# Patient Record
Sex: FEMALE | Race: WHITE | NOT HISPANIC OR LATINO
[De-identification: names, ages, dates, MRNs, and addresses within clinical notes are randomized per-mention and may not be internally consistent; named-entity substitution may affect disease eponyms.]

---

## 2017-01-04 ENCOUNTER — MEDICATION RENEWAL (OUTPATIENT)
Age: 72
End: 2017-01-04

## 2017-01-11 ENCOUNTER — MEDICATION RENEWAL (OUTPATIENT)
Age: 72
End: 2017-01-11

## 2017-04-26 ENCOUNTER — APPOINTMENT (OUTPATIENT)
Dept: ENDOCRINOLOGY | Facility: CLINIC | Age: 72
End: 2017-04-26

## 2017-08-29 ENCOUNTER — OUTPATIENT (OUTPATIENT)
Dept: OUTPATIENT SERVICES | Facility: HOSPITAL | Age: 72
LOS: 1 days | End: 2017-08-29
Payer: MEDICARE

## 2017-08-29 DIAGNOSIS — Z98.89 OTHER SPECIFIED POSTPROCEDURAL STATES: Chronic | ICD-10-CM

## 2017-08-29 PROCEDURE — 76881 US COMPL JOINT R-T W/IMG: CPT

## 2017-08-29 PROCEDURE — 76881 US COMPL JOINT R-T W/IMG: CPT | Mod: 26,LT

## 2017-11-08 ENCOUNTER — APPOINTMENT (OUTPATIENT)
Dept: HEART AND VASCULAR | Facility: CLINIC | Age: 72
End: 2017-11-08
Payer: MEDICARE

## 2017-11-08 VITALS
SYSTOLIC BLOOD PRESSURE: 140 MMHG | OXYGEN SATURATION: 98 % | HEART RATE: 88 BPM | WEIGHT: 142 LBS | HEIGHT: 60.24 IN | TEMPERATURE: 97.8 F | DIASTOLIC BLOOD PRESSURE: 72 MMHG | BODY MASS INDEX: 27.52 KG/M2

## 2017-11-08 PROCEDURE — 99214 OFFICE O/P EST MOD 30 MIN: CPT | Mod: 25

## 2017-11-08 RX ORDER — METRONIDAZOLE 7.5 MG/G
0.75 GEL TOPICAL
Qty: 45 | Refills: 0 | Status: ACTIVE | COMMUNITY
Start: 2017-06-26

## 2017-11-08 RX ORDER — FLUTICASONE PROPIONATE 50 UG/1
50 SPRAY, METERED NASAL
Qty: 16 | Refills: 0 | Status: ACTIVE | COMMUNITY
Start: 2017-08-25

## 2018-05-02 ENCOUNTER — EMERGENCY (EMERGENCY)
Facility: HOSPITAL | Age: 73
LOS: 1 days | Discharge: HOME | End: 2018-05-02
Attending: EMERGENCY MEDICINE | Admitting: EMERGENCY MEDICINE
Payer: MEDICARE

## 2018-05-02 VITALS
TEMPERATURE: 97 F | SYSTOLIC BLOOD PRESSURE: 169 MMHG | HEART RATE: 66 BPM | DIASTOLIC BLOOD PRESSURE: 84 MMHG | RESPIRATION RATE: 16 BRPM | OXYGEN SATURATION: 100 %

## 2018-05-02 VITALS
TEMPERATURE: 97 F | OXYGEN SATURATION: 99 % | DIASTOLIC BLOOD PRESSURE: 79 MMHG | HEART RATE: 75 BPM | RESPIRATION RATE: 18 BRPM | SYSTOLIC BLOOD PRESSURE: 156 MMHG

## 2018-05-02 DIAGNOSIS — Z79.899 OTHER LONG TERM (CURRENT) DRUG THERAPY: ICD-10-CM

## 2018-05-02 DIAGNOSIS — Z98.89 OTHER SPECIFIED POSTPROCEDURAL STATES: Chronic | ICD-10-CM

## 2018-05-02 DIAGNOSIS — R11.0 NAUSEA: ICD-10-CM

## 2018-05-02 DIAGNOSIS — R42 DIZZINESS AND GIDDINESS: ICD-10-CM

## 2018-05-02 DIAGNOSIS — Z88.0 ALLERGY STATUS TO PENICILLIN: ICD-10-CM

## 2018-05-02 DIAGNOSIS — R19.7 DIARRHEA, UNSPECIFIED: ICD-10-CM

## 2018-05-02 DIAGNOSIS — E11.9 TYPE 2 DIABETES MELLITUS WITHOUT COMPLICATIONS: ICD-10-CM

## 2018-05-02 DIAGNOSIS — Z88.4 ALLERGY STATUS TO ANESTHETIC AGENT: ICD-10-CM

## 2018-05-02 DIAGNOSIS — Z88.8 ALLERGY STATUS TO OTHER DRUGS, MEDICAMENTS AND BIOLOGICAL SUBSTANCES: ICD-10-CM

## 2018-05-02 DIAGNOSIS — Z79.84 LONG TERM (CURRENT) USE OF ORAL HYPOGLYCEMIC DRUGS: ICD-10-CM

## 2018-05-02 LAB
ALBUMIN SERPL ELPH-MCNC: 4.7 G/DL — SIGNIFICANT CHANGE UP (ref 3.3–5)
ALP SERPL-CCNC: 76 U/L — SIGNIFICANT CHANGE UP (ref 40–120)
ALT FLD-CCNC: 25 U/L — SIGNIFICANT CHANGE UP (ref 10–45)
ANION GAP SERPL CALC-SCNC: 15 MMOL/L — SIGNIFICANT CHANGE UP (ref 5–17)
APPEARANCE UR: CLEAR — SIGNIFICANT CHANGE UP
AST SERPL-CCNC: 25 U/L — SIGNIFICANT CHANGE UP (ref 10–40)
BASOPHILS NFR BLD AUTO: 0.2 % — SIGNIFICANT CHANGE UP (ref 0–2)
BILIRUB SERPL-MCNC: 0.3 MG/DL — SIGNIFICANT CHANGE UP (ref 0.2–1.2)
BILIRUB UR-MCNC: NEGATIVE — SIGNIFICANT CHANGE UP
BUN SERPL-MCNC: 19 MG/DL — SIGNIFICANT CHANGE UP (ref 7–23)
CALCIUM SERPL-MCNC: 10.2 MG/DL — SIGNIFICANT CHANGE UP (ref 8.4–10.5)
CHLORIDE SERPL-SCNC: 92 MMOL/L — LOW (ref 96–108)
CO2 SERPL-SCNC: 26 MMOL/L — SIGNIFICANT CHANGE UP (ref 22–31)
COLOR SPEC: YELLOW — SIGNIFICANT CHANGE UP
CREAT SERPL-MCNC: 0.58 MG/DL — SIGNIFICANT CHANGE UP (ref 0.5–1.3)
DIFF PNL FLD: (no result)
EOSINOPHIL NFR BLD AUTO: 0.2 % — SIGNIFICANT CHANGE UP (ref 0–6)
EXTRA BLUE TOP TUBE: SIGNIFICANT CHANGE UP
EXTRA SST TUBE: SIGNIFICANT CHANGE UP
GLUCOSE SERPL-MCNC: 194 MG/DL — HIGH (ref 70–99)
GLUCOSE UR QL: NEGATIVE — SIGNIFICANT CHANGE UP
HCT VFR BLD CALC: 39.8 % — SIGNIFICANT CHANGE UP (ref 34.5–45)
HGB BLD-MCNC: 13.7 G/DL — SIGNIFICANT CHANGE UP (ref 11.5–15.5)
KETONES UR-MCNC: NEGATIVE — SIGNIFICANT CHANGE UP
LEUKOCYTE ESTERASE UR-ACNC: NEGATIVE — SIGNIFICANT CHANGE UP
LYMPHOCYTES # BLD AUTO: 10.4 % — LOW (ref 13–44)
MCHC RBC-ENTMCNC: 29.8 PG — SIGNIFICANT CHANGE UP (ref 27–34)
MCHC RBC-ENTMCNC: 34.4 G/DL — SIGNIFICANT CHANGE UP (ref 32–36)
MCV RBC AUTO: 86.7 FL — SIGNIFICANT CHANGE UP (ref 80–100)
MONOCYTES NFR BLD AUTO: 4 % — SIGNIFICANT CHANGE UP (ref 2–14)
NEUTROPHILS NFR BLD AUTO: 85.2 % — HIGH (ref 43–77)
NITRITE UR-MCNC: NEGATIVE — SIGNIFICANT CHANGE UP
PH UR: 6.5 — SIGNIFICANT CHANGE UP (ref 5–8)
PLATELET # BLD AUTO: 354 K/UL — SIGNIFICANT CHANGE UP (ref 150–400)
POTASSIUM SERPL-MCNC: 4 MMOL/L — SIGNIFICANT CHANGE UP (ref 3.5–5.3)
POTASSIUM SERPL-SCNC: 4 MMOL/L — SIGNIFICANT CHANGE UP (ref 3.5–5.3)
PROT SERPL-MCNC: 8.1 G/DL — SIGNIFICANT CHANGE UP (ref 6–8.3)
PROT UR-MCNC: NEGATIVE MG/DL — SIGNIFICANT CHANGE UP
RBC # BLD: 4.59 M/UL — SIGNIFICANT CHANGE UP (ref 3.8–5.2)
RBC # FLD: 13.3 % — SIGNIFICANT CHANGE UP (ref 10.3–16.9)
SODIUM SERPL-SCNC: 133 MMOL/L — LOW (ref 135–145)
SP GR SPEC: 1.01 — SIGNIFICANT CHANGE UP (ref 1–1.03)
UROBILINOGEN FLD QL: 0.2 E.U./DL — SIGNIFICANT CHANGE UP
WBC # BLD: 12.2 K/UL — HIGH (ref 3.8–10.5)
WBC # FLD AUTO: 12.2 K/UL — HIGH (ref 3.8–10.5)

## 2018-05-02 PROCEDURE — 85025 COMPLETE CBC W/AUTO DIFF WBC: CPT

## 2018-05-02 PROCEDURE — 99284 EMERGENCY DEPT VISIT MOD MDM: CPT | Mod: 25

## 2018-05-02 PROCEDURE — 80053 COMPREHEN METABOLIC PANEL: CPT

## 2018-05-02 PROCEDURE — 96374 THER/PROPH/DIAG INJ IV PUSH: CPT

## 2018-05-02 PROCEDURE — 81001 URINALYSIS AUTO W/SCOPE: CPT

## 2018-05-02 PROCEDURE — 93005 ELECTROCARDIOGRAM TRACING: CPT

## 2018-05-02 PROCEDURE — 87086 URINE CULTURE/COLONY COUNT: CPT

## 2018-05-02 PROCEDURE — 93010 ELECTROCARDIOGRAM REPORT: CPT

## 2018-05-02 RX ORDER — ONDANSETRON 8 MG/1
4 TABLET, FILM COATED ORAL ONCE
Qty: 0 | Refills: 0 | Status: COMPLETED | OUTPATIENT
Start: 2018-05-02 | End: 2018-05-02

## 2018-05-02 RX ORDER — SODIUM CHLORIDE 9 MG/ML
1000 INJECTION INTRAMUSCULAR; INTRAVENOUS; SUBCUTANEOUS ONCE
Qty: 0 | Refills: 0 | Status: COMPLETED | OUTPATIENT
Start: 2018-05-02 | End: 2018-05-02

## 2018-05-02 RX ADMIN — ONDANSETRON 4 MILLIGRAM(S): 8 TABLET, FILM COATED ORAL at 11:20

## 2018-05-02 RX ADMIN — SODIUM CHLORIDE 1000 MILLILITER(S): 9 INJECTION INTRAMUSCULAR; INTRAVENOUS; SUBCUTANEOUS at 11:19

## 2018-05-02 NOTE — ED PROVIDER NOTE - OBJECTIVE STATEMENT
72 y/o f hx RA, DM presents stating woke up this morning, feeling dizzy for a few minutes with room spinning.  Pt stating this resolved after brief period of time, then has had several loose stools and feeling nauseous.  Pt stating no longer dizzy, feeling fatigued, thinks maybe dehydrated and still feeling nauseous.  Denies headache, visual changes, fever, abd pain, vomiting, all other ROS negative.

## 2018-05-02 NOTE — ED ADULT NURSE NOTE - OBJECTIVE STATEMENT
72 y/o female c/o nausea, dizziness, diarrhea since 6am this morning. denies any chest pain, vomiting, fever/chills, recent travel, sick contacts, SOB. NAD, VSS. pt ambulates with cane and unsteady gait at baseline. EKG done, labs sent, JEN Christianson at bedside. 72 y/o female c/o nausea, dizziness, diarrhea since 6am this morning. denies any chest pain, vomiting, fever/chills, recent travel, sick contacts, SOB. NAD, VSS. pt ambulates with cane at baseline. EKG done, labs sent, JEN Christianson at bedside.

## 2018-05-02 NOTE — ED PROVIDER NOTE - MEDICAL DECISION MAKING DETAILS
74 y/o f hx RA, DM presents stating having diarrhea, nausea today, associated with dizziness this morning which resolved.  Neuro exam intact, EKG in sinus, labs noted with wbc 12, nontender abd.  Given IV fluid, zofran, feeling better, suspect likely viral etiology.  Will d/c to f/u pmd, advised to return if sx worsen.

## 2018-05-03 LAB
CULTURE RESULTS: SIGNIFICANT CHANGE UP
SPECIMEN SOURCE: SIGNIFICANT CHANGE UP

## 2018-05-08 ENCOUNTER — APPOINTMENT (OUTPATIENT)
Dept: HEART AND VASCULAR | Facility: CLINIC | Age: 73
End: 2018-05-08
Payer: MEDICARE

## 2018-05-08 VITALS
HEART RATE: 87 BPM | SYSTOLIC BLOOD PRESSURE: 150 MMHG | OXYGEN SATURATION: 99 % | TEMPERATURE: 98.2 F | WEIGHT: 146.3 LBS | DIASTOLIC BLOOD PRESSURE: 80 MMHG | BODY MASS INDEX: 27.98 KG/M2 | HEIGHT: 60.63 IN

## 2018-05-08 PROCEDURE — 99213 OFFICE O/P EST LOW 20 MIN: CPT

## 2018-06-11 ENCOUNTER — APPOINTMENT (OUTPATIENT)
Dept: HEART AND VASCULAR | Facility: CLINIC | Age: 73
End: 2018-06-11
Payer: MEDICARE

## 2018-06-11 PROCEDURE — 93306 TTE W/DOPPLER COMPLETE: CPT

## 2018-11-12 ENCOUNTER — APPOINTMENT (OUTPATIENT)
Dept: HEART AND VASCULAR | Facility: CLINIC | Age: 73
End: 2018-11-12
Payer: MEDICARE

## 2018-11-12 VITALS
BODY MASS INDEX: 28.69 KG/M2 | TEMPERATURE: 98 F | HEIGHT: 60.63 IN | SYSTOLIC BLOOD PRESSURE: 130 MMHG | OXYGEN SATURATION: 98 % | WEIGHT: 150 LBS | HEART RATE: 96 BPM | DIASTOLIC BLOOD PRESSURE: 60 MMHG

## 2018-11-12 PROCEDURE — 93000 ELECTROCARDIOGRAM COMPLETE: CPT

## 2018-11-12 PROCEDURE — 99214 OFFICE O/P EST MOD 30 MIN: CPT

## 2018-11-12 RX ORDER — LEVOTHYROXINE SODIUM 125 UG/1
125 TABLET ORAL
Refills: 0 | Status: ACTIVE | COMMUNITY
Start: 2017-07-27

## 2018-11-12 NOTE — ASSESSMENT
[FreeTextEntry1] : LBBB- intermittent, often alternates with a LAHB.  S/P a coronary CTA 2010 with clean coronaries.\par \par Fibromyalgia- formerly a pt of Dr Lyn, soon to be with Dr Merritt, ? PMR per Dr Anton\par \par HTN- stable on no meds, elevated from pain and steroid injections, will defer treatment but would favor an ACE I\par \par DM- screened for CAD in 2010, clinically stable.  She is very sensitive to meds so I am reluctant to start a statin in light of her musculoskeletal pain syndromes\par \par RA- pts conduction abnormalities could be due to RA.  Will follow echo as well for aortic root size. NL June 11, 2018

## 2018-11-12 NOTE — REVIEW OF SYSTEMS
[Joint Pain] : joint pain [Joint Swelling] : joint swelling [Joint Stiffness] : joint stiffness [Muscle Cramps] : muscle cramps [Limb Weakness (Paresis)] : limb weakness

## 2018-11-12 NOTE — HISTORY OF PRESENT ILLNESS
[FreeTextEntry1] : GI- Dr Herbert colonoscopy Oct 2017\par Pulm- Dr Amin\par Rheum- Dr Merritt\par PCP- Dr Anton\par Derm- Dr Lakesha Ramos\par Endo- Dr Tisha MO\par [facility]- Dr Joana Seth, ovarian cysts\par Neuro- Dr Lutz\par Ophtho- Dr Hector Everett\par Pain- Dr Garrison

## 2019-05-14 ENCOUNTER — APPOINTMENT (OUTPATIENT)
Dept: HEART AND VASCULAR | Facility: CLINIC | Age: 74
End: 2019-05-14
Payer: MEDICARE

## 2019-05-14 VITALS
HEIGHT: 60.63 IN | TEMPERATURE: 97.5 F | SYSTOLIC BLOOD PRESSURE: 130 MMHG | WEIGHT: 145.99 LBS | BODY MASS INDEX: 27.92 KG/M2 | DIASTOLIC BLOOD PRESSURE: 60 MMHG | HEART RATE: 89 BPM | OXYGEN SATURATION: 98 %

## 2019-05-14 PROCEDURE — 93000 ELECTROCARDIOGRAM COMPLETE: CPT

## 2019-05-14 PROCEDURE — 99214 OFFICE O/P EST MOD 30 MIN: CPT

## 2019-05-14 NOTE — ASSESSMENT
[FreeTextEntry1] : LBBB- intermittent, often alternates with a LAHB.  S/P a coronary CTA 2010 with clean coronaries and a CCS of zero.\par \par Fibromyalgia- formerly a pt of Dr Lyn, soon to be with Dr Merritt,(never went) ? PMR per Dr Anton\par \par HTN- stable on no meds, elevated from pain and steroid injections, will defer treatment but would favor an ACE I\par \par DM- screened for CAD in 2010 and had a CCS of zero and no plaque on CTA, clinically stable.  She is very sensitive to meds so I am reluctant to start a statin in light of her musculoskeletal pain syndromes\par \par RA- pts conduction abnormalities could be due to RA.  Will follow echo as well for aortic root size. NL June 11, 2018

## 2019-05-14 NOTE — REASON FOR VISIT
[FreeTextEntry1] : 71 y/o F with an intermittent LBBB, DM, RA and occasionally elevated BPs.  A coronary CTA in 2010 revealed clean coronaries and a CCS of zero.  Last echo June 2018  had an EF of 66% and a normal aortic root.\par \par [place] [facility] [place] Had a flu like illness after that, seen in ER glucose high, last A1c 5.9\par Told of PMR\par Had an ulcer(GI)\par \par EKG: NSR, left anterior hemiblock, low voltage,  no ST-Tw abn. 5/14/19

## 2019-05-14 NOTE — HISTORY OF PRESENT ILLNESS
[FreeTextEntry1] : GI- Dr Herbert colonoscopy Oct 2017\par Pulm- Dr Amin\par Rheum- Dr Trung Merritt(never went)\par PCP- Dr Anton\par Derm- Dr Lakesha Ramos\par Endo- Dr Tisha MO\par [facility]- Dr Joana Seth, ovarian cysts\par Neuro- Dr Lutz\par Ophtho- Dr Hector Everett\par Pain- Dr Garrison

## 2019-05-14 NOTE — PHYSICAL EXAM
[Normal Appearance] : normal appearance [General Appearance - Well Developed] : well developed [Well Groomed] : well groomed [General Appearance - Well Nourished] : well nourished [No Deformities] : no deformities [Normal Conjunctiva] : the conjunctiva exhibited no abnormalities [General Appearance - In No Acute Distress] : no acute distress [Eyelids - No Xanthelasma] : the eyelids demonstrated no xanthelasmas [Normal Oral Mucosa] : normal oral mucosa [No Oral Pallor] : no oral pallor [No Oral Cyanosis] : no oral cyanosis [Respiration, Rhythm And Depth] : normal respiratory rhythm and effort [Auscultation Breath Sounds / Voice Sounds] : lungs were clear to auscultation bilaterally [Exaggerated Use Of Accessory Muscles For Inspiration] : no accessory muscle use [Heart Rate And Rhythm] : heart rate and rhythm were normal [Heart Sounds] : normal S1 and S2 [Murmurs] : no murmurs present [Abdomen Soft] : soft [Abdomen Tenderness] : non-tender [Abnormal Walk] : normal gait [Abdomen Mass (___ Cm)] : no abdominal mass palpated [Cyanosis, Localized] : no localized cyanosis [Nail Clubbing] : no clubbing of the fingernails [Petechial Hemorrhages (___cm)] : no petechial hemorrhages [Skin Color & Pigmentation] : normal skin color and pigmentation [No Venous Stasis] : no venous stasis [] : no rash [Skin Lesions] : no skin lesions [No Skin Ulcers] : no skin ulcer [No Xanthoma] : no  xanthoma was observed [Oriented To Time, Place, And Person] : oriented to person, place, and time [Affect] : the affect was normal [Mood] : the mood was normal [No Anxiety] : not feeling anxious [FreeTextEntry1] : no JVD or bruits

## 2019-05-14 NOTE — REVIEW OF SYSTEMS
[Joint Pain] : joint pain [Joint Stiffness] : joint stiffness [Joint Swelling] : joint swelling [Limb Weakness (Paresis)] : limb weakness [Muscle Cramps] : muscle cramps

## 2019-09-26 ENCOUNTER — APPOINTMENT (OUTPATIENT)
Dept: RHEUMATOLOGY | Facility: CLINIC | Age: 74
End: 2019-09-26
Payer: MEDICARE

## 2019-09-26 VITALS
SYSTOLIC BLOOD PRESSURE: 125 MMHG | OXYGEN SATURATION: 99 % | TEMPERATURE: 98.6 F | BODY MASS INDEX: 28.69 KG/M2 | HEIGHT: 60.63 IN | DIASTOLIC BLOOD PRESSURE: 72 MMHG | HEART RATE: 93 BPM | WEIGHT: 150 LBS

## 2019-09-26 DIAGNOSIS — M06.9 RHEUMATOID ARTHRITIS, UNSPECIFIED: ICD-10-CM

## 2019-09-26 DIAGNOSIS — M81.0 AGE-RELATED OSTEOPOROSIS W/OUT CURRENT PATHOLOGICAL FRACTURE: ICD-10-CM

## 2019-09-26 PROCEDURE — 36415 COLL VENOUS BLD VENIPUNCTURE: CPT

## 2019-09-26 PROCEDURE — 99205 OFFICE O/P NEW HI 60 MIN: CPT | Mod: 25

## 2019-10-02 LAB
ALBUMIN SERPL ELPH-MCNC: 4.8 G/DL
ALP BLD-CCNC: 87 U/L
ALT SERPL-CCNC: 24 U/L
ANA SER IF-ACNC: NEGATIVE
ANION GAP SERPL CALC-SCNC: 14 MMOL/L
APTT 2H P 1:4 NP PPP: 33.2 SEC
APTT 2H P INC PPP: 33.2 SEC
APTT HEX PL PPP: NEGATIVE
APTT IMM NP/PRE NP PPP: 32.7 SEC
APTT INV RATIO PPP: 38.1 SEC
AST SERPL-CCNC: 22 U/L
B2 GLYCOPROT1 IGA SERPL IA-ACNC: <5 SAU
B2 GLYCOPROT1 IGG SER-ACNC: <5 SGU
B2 GLYCOPROT1 IGM SER-ACNC: <5 SMU
BASOPHILS # BLD AUTO: 0.05 K/UL
BASOPHILS NFR BLD AUTO: 0.6 %
BILIRUB SERPL-MCNC: 0.2 MG/DL
BUN SERPL-MCNC: 22 MG/DL
C3 SERPL-MCNC: 176 MG/DL
C4 SERPL-MCNC: 33 MG/DL
CALCIUM SERPL-MCNC: 10.4 MG/DL
CARDIOLIPIN IGM SER-MCNC: 7.5 GPL
CARDIOLIPIN IGM SER-MCNC: 9.1 MPL
CCP AB SER IA-ACNC: <8 UNITS
CENTROMERE IGG SER-ACNC: <0.2 CD:130001892
CHLORIDE SERPL-SCNC: 103 MMOL/L
CO2 SERPL-SCNC: 24 MMOL/L
CONFIRM: 25.7 SEC
CREAT SERPL-MCNC: 0.56 MG/DL
CRP SERPL-MCNC: 0.62 MG/DL
DEPRECATED CARDIOLIPIN IGA SER: <5 APL
DRVVT IMM 1:2 NP PPP: NORMAL
DRVVT SCREEN TO CONFIRM RATIO: 0.91 RATIO
DSDNA AB SER-ACNC: <12 IU/ML
ENA RNP AB SER IA-ACNC: <0.2 AL
ENA SCL70 IGG SER IA-ACNC: <0.2 AL
ENA SM AB SER IA-ACNC: <0.2 AL
ENA SS-A AB SER IA-ACNC: <0.2 AL
ENA SS-B AB SER IA-ACNC: <0.2 AL
EOSINOPHIL # BLD AUTO: 0.12 K/UL
EOSINOPHIL NFR BLD AUTO: 1.3 %
ERYTHROCYTE [SEDIMENTATION RATE] IN BLOOD BY WESTERGREN METHOD: 32 MM/HR
GLUCOSE SERPL-MCNC: 169 MG/DL
HCT VFR BLD CALC: 41.4 %
HEX-1: 49.9 SECS
HEX-2: 47.1 SECS
HGB BLD-MCNC: 12.9 G/DL
IGA SER QL IEP: 82 MG/DL
IGG SER QL IEP: 644 MG/DL
IGM SER QL IEP: 225 MG/DL
IMM GRANULOCYTES NFR BLD AUTO: 0.3 %
LYMPHOCYTES # BLD AUTO: 2.04 K/UL
LYMPHOCYTES NFR BLD AUTO: 22.6 %
MAN DIFF?: NORMAL
MCHC RBC-ENTMCNC: 28.5 PG
MCHC RBC-ENTMCNC: 31.2 GM/DL
MCV RBC AUTO: 91.6 FL
MONOCYTES # BLD AUTO: 0.81 K/UL
MONOCYTES NFR BLD AUTO: 9 %
NEUTROPHILS # BLD AUTO: 5.97 K/UL
NEUTROPHILS NFR BLD AUTO: 66.2 %
NPP NORMAL POOLED PLASMA: 32.6 SECS
PLATELET # BLD AUTO: 350 K/UL
POTASSIUM SERPL-SCNC: 4.4 MMOL/L
PROT SERPL-MCNC: 6.8 G/DL
PS IGA SER QL: <20 U/ML
PS IGG SER QL: <10 U/ML
PS IGM SER QL: 28 U/ML
RBC # BLD: 4.52 M/UL
RBC # FLD: 14.4 %
RF+CCP IGG SER-IMP: NEGATIVE
RHEUMATOID FACT SER QL: <10 IU/ML
SCREEN DRVVT: 29.4 SEC
SILICA CLOTTING TIME INTERPRETATION: NORMAL
SILICA CLOTTING TIME S/C: 1.03 RATIO
SODIUM SERPL-SCNC: 141 MMOL/L
WBC # FLD AUTO: 9.02 K/UL

## 2019-10-07 ENCOUNTER — FORM ENCOUNTER (OUTPATIENT)
Age: 74
End: 2019-10-07

## 2019-10-08 ENCOUNTER — OUTPATIENT (OUTPATIENT)
Dept: OUTPATIENT SERVICES | Facility: HOSPITAL | Age: 74
LOS: 1 days | End: 2019-10-08
Payer: MEDICARE

## 2019-10-08 DIAGNOSIS — Z98.89 OTHER SPECIFIED POSTPROCEDURAL STATES: Chronic | ICD-10-CM

## 2019-10-08 PROCEDURE — 73130 X-RAY EXAM OF HAND: CPT | Mod: 26,50

## 2019-10-08 PROCEDURE — 73080 X-RAY EXAM OF ELBOW: CPT

## 2019-10-08 PROCEDURE — 72202 X-RAY EXAM SI JOINTS 3/> VWS: CPT | Mod: 26

## 2019-10-08 PROCEDURE — 73080 X-RAY EXAM OF ELBOW: CPT | Mod: 26,50

## 2019-10-08 PROCEDURE — 73130 X-RAY EXAM OF HAND: CPT

## 2019-10-08 PROCEDURE — 72202 X-RAY EXAM SI JOINTS 3/> VWS: CPT

## 2019-11-07 ENCOUNTER — APPOINTMENT (OUTPATIENT)
Dept: RHEUMATOLOGY | Facility: CLINIC | Age: 74
End: 2019-11-07

## 2019-11-18 ENCOUNTER — APPOINTMENT (OUTPATIENT)
Dept: HEART AND VASCULAR | Facility: CLINIC | Age: 74
End: 2019-11-18
Payer: MEDICARE

## 2019-11-18 VITALS
OXYGEN SATURATION: 98 % | TEMPERATURE: 97.5 F | WEIGHT: 143.98 LBS | HEART RATE: 83 BPM | HEIGHT: 60.63 IN | DIASTOLIC BLOOD PRESSURE: 60 MMHG | BODY MASS INDEX: 27.54 KG/M2 | SYSTOLIC BLOOD PRESSURE: 122 MMHG

## 2019-11-18 PROCEDURE — 99213 OFFICE O/P EST LOW 20 MIN: CPT

## 2019-11-18 PROCEDURE — 93000 ELECTROCARDIOGRAM COMPLETE: CPT

## 2019-11-18 NOTE — REVIEW OF SYSTEMS
[Joint Pain] : joint pain [Joint Stiffness] : joint stiffness [Joint Swelling] : joint swelling [Muscle Cramps] : muscle cramps [Limb Weakness (Paresis)] : limb weakness

## 2019-11-18 NOTE — REASON FOR VISIT
[FreeTextEntry1] : 73 y/o F with an intermittent LBBB, DM, RA and occasionally elevated BPs.  A coronary CTA in 2010 revealed clean coronaries and a CCS of zero.  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 RBBB is new as of 11/18/19

## 2019-11-18 NOTE — ASSESSMENT
[FreeTextEntry1] : LBBB- intermittent, often alternates with a LAHB.  S/P a coronary CTA 2010 with clean coronaries and a CCS of zero.  Now  a RBBB on 11/18/19\par \par Fibromyalgia- formerly a pt of Dr Lyn, soon to be with Dr Merritt,  ? PMR per Dr Anton\par \par HTN- stable on no meds, elevated from pain and steroid injections, will defer treatment but would favor an ACE I.  BP better\par \par DM- screened for CAD in 2010 and had a CCS of zero and no plaque on CTA, clinically stable.  She is very sensitive to meds so I am reluctant to start a statin in light of her musculoskeletal pain syndromes\par \par RA- pts conduction abnormalities could be due to RA.  Will follow echo as well for aortic root size. NL June 11, 2018.  New RBBB on 11/18/19.  Pt did not click with Dr Merritt who did not think pt had RA.

## 2019-11-18 NOTE — PHYSICAL EXAM
[Normal Appearance] : normal appearance [General Appearance - Well Developed] : well developed [Well Groomed] : well groomed [No Deformities] : no deformities [General Appearance - Well Nourished] : well nourished [General Appearance - In No Acute Distress] : no acute distress [Normal Conjunctiva] : the conjunctiva exhibited no abnormalities [Eyelids - No Xanthelasma] : the eyelids demonstrated no xanthelasmas [Normal Oral Mucosa] : normal oral mucosa [No Oral Pallor] : no oral pallor [No Oral Cyanosis] : no oral cyanosis [Respiration, Rhythm And Depth] : normal respiratory rhythm and effort [Exaggerated Use Of Accessory Muscles For Inspiration] : no accessory muscle use [Auscultation Breath Sounds / Voice Sounds] : lungs were clear to auscultation bilaterally [Heart Rate And Rhythm] : heart rate and rhythm were normal [Heart Sounds] : normal S1 and S2 [Abdomen Soft] : soft [Murmurs] : no murmurs present [Abdomen Tenderness] : non-tender [Abdomen Mass (___ Cm)] : no abdominal mass palpated [Abnormal Walk] : normal gait [Nail Clubbing] : no clubbing of the fingernails [Cyanosis, Localized] : no localized cyanosis [Petechial Hemorrhages (___cm)] : no petechial hemorrhages [Skin Color & Pigmentation] : normal skin color and pigmentation [] : no rash [No Venous Stasis] : no venous stasis [Skin Lesions] : no skin lesions [No Xanthoma] : no  xanthoma was observed [No Skin Ulcers] : no skin ulcer [Oriented To Time, Place, And Person] : oriented to person, place, and time [Affect] : the affect was normal [Mood] : the mood was normal [No Anxiety] : not feeling anxious [FreeTextEntry1] : no JVD or bruits

## 2020-05-06 ENCOUNTER — APPOINTMENT (OUTPATIENT)
Dept: HEART AND VASCULAR | Facility: CLINIC | Age: 75
End: 2020-05-06

## 2020-05-11 ENCOUNTER — APPOINTMENT (OUTPATIENT)
Dept: HEART AND VASCULAR | Facility: CLINIC | Age: 75
End: 2020-05-11
Payer: MEDICARE

## 2020-05-11 PROCEDURE — 99442: CPT | Mod: CR

## 2020-09-16 ENCOUNTER — APPOINTMENT (OUTPATIENT)
Dept: HEART AND VASCULAR | Facility: CLINIC | Age: 75
End: 2020-09-16
Payer: MEDICARE

## 2020-09-16 VITALS
HEART RATE: 96 BPM | BODY MASS INDEX: 27.54 KG/M2 | WEIGHT: 143.98 LBS | DIASTOLIC BLOOD PRESSURE: 80 MMHG | SYSTOLIC BLOOD PRESSURE: 140 MMHG | OXYGEN SATURATION: 98 % | TEMPERATURE: 97.4 F | HEIGHT: 60.63 IN

## 2020-09-16 DIAGNOSIS — R25.2 CRAMP AND SPASM: ICD-10-CM

## 2020-09-16 DIAGNOSIS — E03.9 HYPOTHYROIDISM, UNSPECIFIED: ICD-10-CM

## 2020-09-16 PROCEDURE — 36415 COLL VENOUS BLD VENIPUNCTURE: CPT

## 2020-09-16 PROCEDURE — 93000 ELECTROCARDIOGRAM COMPLETE: CPT

## 2020-09-16 PROCEDURE — 99214 OFFICE O/P EST MOD 30 MIN: CPT

## 2020-09-16 NOTE — REVIEW OF SYSTEMS
[Joint Pain] : joint pain [Joint Swelling] : joint swelling [Joint Stiffness] : joint stiffness [Limb Weakness (Paresis)] : limb weakness [Muscle Cramps] : muscle cramps [Feeling Fatigued] : feeling fatigued [Dyspnea on exertion] : dyspnea during exertion [Negative] : Gastrointestinal

## 2020-09-17 LAB
T3 SERPL-MCNC: 76 NG/DL
T4 SERPL-MCNC: 4.3 UG/DL
TSH SERPL-ACNC: 40.6 UIU/ML

## 2020-09-17 NOTE — HISTORY OF PRESENT ILLNESS
[FreeTextEntry1] : GI- Dr Herbert colonoscopy Oct 2017\par Pulm- Dr Amin\par Rheum- Dr Trung Merritt(never went)\par PCP- Dr Anton\par Derm- Dr Lakesha Ramos\par Endo- Dr Jessica MO\par [facility]- Dr Joana Seth, ovarian cysts\par Neuro- Dr Lutz\par Ophtho- Dr Hector Everett\par Pain- Dr Garrison\par PT- Dr Manuelito Noe

## 2020-09-17 NOTE — ASSESSMENT
[FreeTextEntry1] : LBBB- intermittent, often alternates with a LAHB.  S/P a coronary CTA 2010 with clean coronaries and a CCS of zero.  Now  a RBBB on 11/18/19.  TFTs sent as she is on reduced thyroid hormone.\par \par Leg pains- After examining pt and finding B/L 2+ DP pulses I doubt that this is claudication, MARCIO ordered.\par Fibromyalgia- formerly a pt of Dr Lyn, soon to be with Dr Merritt,  ? PMR per Dr Anton\par \par HTN- stable on no meds, elevated from pain and steroid injections, will defer treatment but would favor an ACE I.  BP borderline\par \par DM- screened for CAD in 2010 and had a CCS of zero and no plaque on CTA, clinically stable.  She is very sensitive to meds so I am reluctant to start a statin in light of her musculoskeletal pain syndromes\par \par RA- pts conduction abnormalities could be due to RA.  Will follow echo as well for aortic root size. NL June 11, 2018.  New RBBB on 11/18/19.  Pt did not click with Dr Merritt who did not think pt had RA.

## 2020-09-17 NOTE — PHYSICAL EXAM
[Normal Appearance] : normal appearance [General Appearance - Well Developed] : well developed [General Appearance - Well Nourished] : well nourished [Well Groomed] : well groomed [No Deformities] : no deformities [General Appearance - In No Acute Distress] : no acute distress [Eyelids - No Xanthelasma] : the eyelids demonstrated no xanthelasmas [Normal Conjunctiva] : the conjunctiva exhibited no abnormalities [Respiration, Rhythm And Depth] : normal respiratory rhythm and effort [Heart Rate And Rhythm] : heart rate and rhythm were normal [Auscultation Breath Sounds / Voice Sounds] : lungs were clear to auscultation bilaterally [Exaggerated Use Of Accessory Muscles For Inspiration] : no accessory muscle use [Murmurs] : no murmurs present [Heart Sounds] : normal S1 and S2 [Abdomen Soft] : soft [Abdomen Mass (___ Cm)] : no abdominal mass palpated [Abdomen Tenderness] : non-tender [Nail Clubbing] : no clubbing of the fingernails [Abnormal Walk] : normal gait [Petechial Hemorrhages (___cm)] : no petechial hemorrhages [Cyanosis, Localized] : no localized cyanosis [Skin Color & Pigmentation] : normal skin color and pigmentation [] : no rash [No Venous Stasis] : no venous stasis [Skin Lesions] : no skin lesions [No Skin Ulcers] : no skin ulcer [Oriented To Time, Place, And Person] : oriented to person, place, and time [No Xanthoma] : no  xanthoma was observed [No Anxiety] : not feeling anxious [Affect] : the affect was normal [Mood] : the mood was normal [FreeTextEntry1] : DP 2+ B/L, PT not felt

## 2020-09-17 NOTE — REASON FOR VISIT
[FreeTextEntry1] : 71 y/o F with an intermittent LBBB, DM, RA and occasionally elevated BPs.  A coronary CTA in 2010 revealed clean coronaries and a CCS of zero.  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  Therapist could not find pedal pulses.\par \par EKG: NSR, RBBB, left anterior hemiblock, low voltage,  no ST-Tw abn. RBBB is new as of 11/18/19\par 9/16/20

## 2020-09-18 ENCOUNTER — APPOINTMENT (OUTPATIENT)
Dept: HEART AND VASCULAR | Facility: CLINIC | Age: 75
End: 2020-09-18
Payer: MEDICARE

## 2020-09-18 PROCEDURE — 93880 EXTRACRANIAL BILAT STUDY: CPT

## 2020-10-04 ENCOUNTER — TRANSCRIPTION ENCOUNTER (OUTPATIENT)
Age: 75
End: 2020-10-04

## 2020-10-05 ENCOUNTER — RESULT REVIEW (OUTPATIENT)
Age: 75
End: 2020-10-05

## 2020-10-05 ENCOUNTER — INPATIENT (INPATIENT)
Facility: HOSPITAL | Age: 75
LOS: 6 days | Discharge: NURSING, REHAB OR LONG-TERM CARE | DRG: 853 | End: 2020-10-12
Attending: SURGERY | Admitting: SURGERY
Payer: MEDICARE

## 2020-10-05 VITALS
SYSTOLIC BLOOD PRESSURE: 157 MMHG | TEMPERATURE: 98 F | OXYGEN SATURATION: 100 % | HEIGHT: 60 IN | DIASTOLIC BLOOD PRESSURE: 69 MMHG | WEIGHT: 149.91 LBS | RESPIRATION RATE: 22 BRPM | HEART RATE: 59 BPM

## 2020-10-05 DIAGNOSIS — Z98.890 OTHER SPECIFIED POSTPROCEDURAL STATES: Chronic | ICD-10-CM

## 2020-10-05 DIAGNOSIS — Z98.89 OTHER SPECIFIED POSTPROCEDURAL STATES: Chronic | ICD-10-CM

## 2020-10-05 DIAGNOSIS — Z90.722 ACQUIRED ABSENCE OF OVARIES, BILATERAL: Chronic | ICD-10-CM

## 2020-10-05 DIAGNOSIS — Z90.49 ACQUIRED ABSENCE OF OTHER SPECIFIED PARTS OF DIGESTIVE TRACT: Chronic | ICD-10-CM

## 2020-10-05 LAB
ALBUMIN SERPL ELPH-MCNC: 2.6 G/DL — LOW (ref 3.3–5)
ALBUMIN SERPL ELPH-MCNC: 3.4 G/DL — SIGNIFICANT CHANGE UP (ref 3.3–5)
ALBUMIN SERPL ELPH-MCNC: 4.4 G/DL — SIGNIFICANT CHANGE UP (ref 3.3–5)
ALP SERPL-CCNC: 47 U/L — SIGNIFICANT CHANGE UP (ref 40–120)
ALP SERPL-CCNC: 62 U/L — SIGNIFICANT CHANGE UP (ref 40–120)
ALP SERPL-CCNC: 78 U/L — SIGNIFICANT CHANGE UP (ref 40–120)
ALT FLD-CCNC: 19 U/L — SIGNIFICANT CHANGE UP (ref 10–45)
ALT FLD-CCNC: 20 U/L — SIGNIFICANT CHANGE UP (ref 10–45)
ALT FLD-CCNC: SIGNIFICANT CHANGE UP U/L (ref 10–45)
ANION GAP SERPL CALC-SCNC: 13 MMOL/L — SIGNIFICANT CHANGE UP (ref 5–17)
ANION GAP SERPL CALC-SCNC: 14 MMOL/L — SIGNIFICANT CHANGE UP (ref 5–17)
ANION GAP SERPL CALC-SCNC: 21 MMOL/L — HIGH (ref 5–17)
APPEARANCE UR: CLEAR — SIGNIFICANT CHANGE UP
APTT BLD: 28.9 SEC — SIGNIFICANT CHANGE UP (ref 27.5–35.5)
AST SERPL-CCNC: 22 U/L — SIGNIFICANT CHANGE UP (ref 10–40)
AST SERPL-CCNC: 28 U/L — SIGNIFICANT CHANGE UP (ref 10–40)
AST SERPL-CCNC: SIGNIFICANT CHANGE UP U/L (ref 10–40)
BACTERIA # UR AUTO: PRESENT /HPF
BASE EXCESS BLDA CALC-SCNC: -5.8 MMOL/L — LOW (ref -2–3)
BASE EXCESS BLDA CALC-SCNC: -5.8 MMOL/L — LOW (ref -2–3)
BASOPHILS # BLD AUTO: 0.07 K/UL — SIGNIFICANT CHANGE UP (ref 0–0.2)
BASOPHILS NFR BLD AUTO: 0.4 % — SIGNIFICANT CHANGE UP (ref 0–2)
BILIRUB SERPL-MCNC: 0.2 MG/DL — SIGNIFICANT CHANGE UP (ref 0.2–1.2)
BILIRUB SERPL-MCNC: 0.3 MG/DL — SIGNIFICANT CHANGE UP (ref 0.2–1.2)
BILIRUB SERPL-MCNC: <0.2 MG/DL — SIGNIFICANT CHANGE UP (ref 0.2–1.2)
BILIRUB UR-MCNC: NEGATIVE — SIGNIFICANT CHANGE UP
BLD GP AB SCN SERPL QL: NEGATIVE — SIGNIFICANT CHANGE UP
BUN SERPL-MCNC: 13 MG/DL — SIGNIFICANT CHANGE UP (ref 7–23)
BUN SERPL-MCNC: 20 MG/DL — SIGNIFICANT CHANGE UP (ref 7–23)
BUN SERPL-MCNC: 22 MG/DL — SIGNIFICANT CHANGE UP (ref 7–23)
CALCIUM SERPL-MCNC: 10.5 MG/DL — SIGNIFICANT CHANGE UP (ref 8.4–10.5)
CALCIUM SERPL-MCNC: 7.1 MG/DL — LOW (ref 8.4–10.5)
CALCIUM SERPL-MCNC: 8.3 MG/DL — LOW (ref 8.4–10.5)
CHLORIDE SERPL-SCNC: 102 MMOL/L — SIGNIFICANT CHANGE UP (ref 96–108)
CHLORIDE SERPL-SCNC: 106 MMOL/L — SIGNIFICANT CHANGE UP (ref 96–108)
CHLORIDE SERPL-SCNC: 95 MMOL/L — LOW (ref 96–108)
CK SERPL-CCNC: 93 U/L — SIGNIFICANT CHANGE UP (ref 25–170)
CO2 SERPL-SCNC: 16 MMOL/L — LOW (ref 22–31)
CO2 SERPL-SCNC: 19 MMOL/L — LOW (ref 22–31)
CO2 SERPL-SCNC: 20 MMOL/L — LOW (ref 22–31)
COLOR SPEC: YELLOW — SIGNIFICANT CHANGE UP
COMMENT - URINE: SIGNIFICANT CHANGE UP
CREAT SERPL-MCNC: 0.49 MG/DL — LOW (ref 0.5–1.3)
CREAT SERPL-MCNC: 0.58 MG/DL — SIGNIFICANT CHANGE UP (ref 0.5–1.3)
CREAT SERPL-MCNC: 0.62 MG/DL — SIGNIFICANT CHANGE UP (ref 0.5–1.3)
DIFF PNL FLD: ABNORMAL
EOSINOPHIL # BLD AUTO: 0.01 K/UL — SIGNIFICANT CHANGE UP (ref 0–0.5)
EOSINOPHIL NFR BLD AUTO: 0.1 % — SIGNIFICANT CHANGE UP (ref 0–6)
EPI CELLS # UR: SIGNIFICANT CHANGE UP /HPF (ref 0–5)
GAS PNL BLDA: SIGNIFICANT CHANGE UP
GAS PNL BLDA: SIGNIFICANT CHANGE UP
GLUCOSE SERPL-MCNC: 175 MG/DL — HIGH (ref 70–99)
GLUCOSE SERPL-MCNC: 207 MG/DL — HIGH (ref 70–99)
GLUCOSE SERPL-MCNC: 237 MG/DL — HIGH (ref 70–99)
GLUCOSE UR QL: NEGATIVE — SIGNIFICANT CHANGE UP
HCO3 BLDA-SCNC: 18 MMOL/L — LOW (ref 21–28)
HCO3 BLDA-SCNC: 18 MMOL/L — LOW (ref 21–28)
HCT VFR BLD CALC: 31.4 % — LOW (ref 34.5–45)
HCT VFR BLD CALC: 43.8 % — SIGNIFICANT CHANGE UP (ref 34.5–45)
HGB BLD-MCNC: 10.3 G/DL — LOW (ref 11.5–15.5)
HGB BLD-MCNC: 14.3 G/DL — SIGNIFICANT CHANGE UP (ref 11.5–15.5)
IMM GRANULOCYTES NFR BLD AUTO: 0.5 % — SIGNIFICANT CHANGE UP (ref 0–1.5)
INR BLD: 1.15 — SIGNIFICANT CHANGE UP (ref 0.88–1.16)
KETONES UR-MCNC: >=80 MG/DL
LACTATE SERPL-SCNC: 2.4 MMOL/L — HIGH (ref 0.5–2)
LACTATE SERPL-SCNC: 4.7 MMOL/L — CRITICAL HIGH (ref 0.5–2)
LEUKOCYTE ESTERASE UR-ACNC: NEGATIVE — SIGNIFICANT CHANGE UP
LIDOCAIN IGE QN: 15 U/L — SIGNIFICANT CHANGE UP (ref 7–60)
LYMPHOCYTES # BLD AUTO: 1.25 K/UL — SIGNIFICANT CHANGE UP (ref 1–3.3)
LYMPHOCYTES # BLD AUTO: 7.2 % — LOW (ref 13–44)
MAGNESIUM SERPL-MCNC: 1.1 MG/DL — LOW (ref 1.6–2.6)
MCHC RBC-ENTMCNC: 29.4 PG — SIGNIFICANT CHANGE UP (ref 27–34)
MCHC RBC-ENTMCNC: 29.4 PG — SIGNIFICANT CHANGE UP (ref 27–34)
MCHC RBC-ENTMCNC: 32.6 GM/DL — SIGNIFICANT CHANGE UP (ref 32–36)
MCHC RBC-ENTMCNC: 32.8 GM/DL — SIGNIFICANT CHANGE UP (ref 32–36)
MCV RBC AUTO: 89.7 FL — SIGNIFICANT CHANGE UP (ref 80–100)
MCV RBC AUTO: 90.1 FL — SIGNIFICANT CHANGE UP (ref 80–100)
MONOCYTES # BLD AUTO: 0.84 K/UL — SIGNIFICANT CHANGE UP (ref 0–0.9)
MONOCYTES NFR BLD AUTO: 4.8 % — SIGNIFICANT CHANGE UP (ref 2–14)
NEUTROPHILS # BLD AUTO: 15.14 K/UL — HIGH (ref 1.8–7.4)
NEUTROPHILS NFR BLD AUTO: 87 % — HIGH (ref 43–77)
NITRITE UR-MCNC: NEGATIVE — SIGNIFICANT CHANGE UP
NRBC # BLD: 0 /100 WBCS — SIGNIFICANT CHANGE UP (ref 0–0)
NRBC # BLD: 0 /100 WBCS — SIGNIFICANT CHANGE UP (ref 0–0)
PCO2 BLDA: 29 MMHG — LOW (ref 32–45)
PCO2 BLDA: 32 MMHG — SIGNIFICANT CHANGE UP (ref 32–45)
PH BLDA: 7.37 — SIGNIFICANT CHANGE UP (ref 7.35–7.45)
PH BLDA: 7.4 — SIGNIFICANT CHANGE UP (ref 7.35–7.45)
PH UR: 6 — SIGNIFICANT CHANGE UP (ref 5–8)
PHOSPHATE SERPL-MCNC: 2.2 MG/DL — LOW (ref 2.5–4.5)
PLATELET # BLD AUTO: 288 K/UL — SIGNIFICANT CHANGE UP (ref 150–400)
PLATELET # BLD AUTO: 337 K/UL — SIGNIFICANT CHANGE UP (ref 150–400)
PO2 BLDA: 253 MMHG — HIGH (ref 83–108)
PO2 BLDA: 349 MMHG — HIGH (ref 83–108)
POTASSIUM SERPL-MCNC: 3.7 MMOL/L — SIGNIFICANT CHANGE UP (ref 3.5–5.3)
POTASSIUM SERPL-MCNC: 3.9 MMOL/L — SIGNIFICANT CHANGE UP (ref 3.5–5.3)
POTASSIUM SERPL-MCNC: SIGNIFICANT CHANGE UP MMOL/L (ref 3.5–5.3)
POTASSIUM SERPL-SCNC: 3.7 MMOL/L — SIGNIFICANT CHANGE UP (ref 3.5–5.3)
POTASSIUM SERPL-SCNC: 3.9 MMOL/L — SIGNIFICANT CHANGE UP (ref 3.5–5.3)
POTASSIUM SERPL-SCNC: SIGNIFICANT CHANGE UP MMOL/L (ref 3.5–5.3)
PROT SERPL-MCNC: 4.3 G/DL — LOW (ref 6–8.3)
PROT SERPL-MCNC: 5.8 G/DL — LOW (ref 6–8.3)
PROT SERPL-MCNC: 7.7 G/DL — SIGNIFICANT CHANGE UP (ref 6–8.3)
PROT UR-MCNC: 100 MG/DL
PROTHROM AB SERPL-ACNC: 13.7 SEC — HIGH (ref 10.6–13.6)
RBC # BLD: 3.5 M/UL — LOW (ref 3.8–5.2)
RBC # BLD: 4.86 M/UL — SIGNIFICANT CHANGE UP (ref 3.8–5.2)
RBC # FLD: 13.4 % — SIGNIFICANT CHANGE UP (ref 10.3–14.5)
RBC # FLD: 13.6 % — SIGNIFICANT CHANGE UP (ref 10.3–14.5)
RBC CASTS # UR COMP ASSIST: < 5 /HPF — SIGNIFICANT CHANGE UP
RH IG SCN BLD-IMP: POSITIVE — SIGNIFICANT CHANGE UP
SAO2 % BLDA: 98 % — SIGNIFICANT CHANGE UP (ref 95–100)
SAO2 % BLDA: 98 % — SIGNIFICANT CHANGE UP (ref 95–100)
SARS-COV-2 RNA SPEC QL NAA+PROBE: SIGNIFICANT CHANGE UP
SODIUM SERPL-SCNC: 135 MMOL/L — SIGNIFICANT CHANGE UP (ref 135–145)
SODIUM SERPL-SCNC: 135 MMOL/L — SIGNIFICANT CHANGE UP (ref 135–145)
SODIUM SERPL-SCNC: 136 MMOL/L — SIGNIFICANT CHANGE UP (ref 135–145)
SP GR SPEC: >=1.03 — SIGNIFICANT CHANGE UP (ref 1–1.03)
TROPONIN T SERPL-MCNC: <0.01 NG/ML — SIGNIFICANT CHANGE UP (ref 0–0.01)
UROBILINOGEN FLD QL: 0.2 E.U./DL — SIGNIFICANT CHANGE UP
WBC # BLD: 13.95 K/UL — HIGH (ref 3.8–10.5)
WBC # BLD: 17.39 K/UL — HIGH (ref 3.8–10.5)
WBC # FLD AUTO: 13.95 K/UL — HIGH (ref 3.8–10.5)
WBC # FLD AUTO: 17.39 K/UL — HIGH (ref 3.8–10.5)
WBC UR QL: < 5 /HPF — SIGNIFICANT CHANGE UP

## 2020-10-05 PROCEDURE — 74177 CT ABD & PELVIS W/CONTRAST: CPT | Mod: 26

## 2020-10-05 PROCEDURE — 71045 X-RAY EXAM CHEST 1 VIEW: CPT | Mod: 26,76

## 2020-10-05 PROCEDURE — 99291 CRITICAL CARE FIRST HOUR: CPT

## 2020-10-05 PROCEDURE — 88307 TISSUE EXAM BY PATHOLOGIST: CPT | Mod: 26

## 2020-10-05 PROCEDURE — 93010 ELECTROCARDIOGRAM REPORT: CPT

## 2020-10-05 PROCEDURE — 99285 EMERGENCY DEPT VISIT HI MDM: CPT | Mod: CS

## 2020-10-05 RX ORDER — INSULIN LISPRO 100/ML
VIAL (ML) SUBCUTANEOUS
Refills: 0 | Status: DISCONTINUED | OUTPATIENT
Start: 2020-10-05 | End: 2020-10-12

## 2020-10-05 RX ORDER — GLUCAGON INJECTION, SOLUTION 0.5 MG/.1ML
1 INJECTION, SOLUTION SUBCUTANEOUS ONCE
Refills: 0 | Status: DISCONTINUED | OUTPATIENT
Start: 2020-10-05 | End: 2020-10-12

## 2020-10-05 RX ORDER — ONDANSETRON 8 MG/1
4 TABLET, FILM COATED ORAL EVERY 6 HOURS
Refills: 0 | Status: DISCONTINUED | OUTPATIENT
Start: 2020-10-05 | End: 2020-10-12

## 2020-10-05 RX ORDER — BUPIVACAINE 13.3 MG/ML
20 INJECTION, SUSPENSION, LIPOSOMAL INFILTRATION ONCE
Refills: 0 | Status: DISCONTINUED | OUTPATIENT
Start: 2020-10-05 | End: 2020-10-05

## 2020-10-05 RX ORDER — METRONIDAZOLE 500 MG
500 TABLET ORAL ONCE
Refills: 0 | Status: COMPLETED | OUTPATIENT
Start: 2020-10-05 | End: 2020-10-05

## 2020-10-05 RX ORDER — DEXTROSE 50 % IN WATER 50 %
12.5 SYRINGE (ML) INTRAVENOUS ONCE
Refills: 0 | Status: DISCONTINUED | OUTPATIENT
Start: 2020-10-05 | End: 2020-10-12

## 2020-10-05 RX ORDER — PROPOFOL 10 MG/ML
10 INJECTION, EMULSION INTRAVENOUS
Qty: 500 | Refills: 0 | Status: DISCONTINUED | OUTPATIENT
Start: 2020-10-05 | End: 2020-10-06

## 2020-10-05 RX ORDER — SODIUM CHLORIDE 9 MG/ML
1000 INJECTION INTRAMUSCULAR; INTRAVENOUS; SUBCUTANEOUS ONCE
Refills: 0 | Status: COMPLETED | OUTPATIENT
Start: 2020-10-05 | End: 2020-10-05

## 2020-10-05 RX ORDER — FLUCONAZOLE 150 MG/1
400 TABLET ORAL EVERY 24 HOURS
Refills: 0 | Status: DISCONTINUED | OUTPATIENT
Start: 2020-10-05 | End: 2020-10-12

## 2020-10-05 RX ORDER — LEVOTHYROXINE SODIUM 125 MCG
105 TABLET ORAL
Refills: 0 | Status: DISCONTINUED | OUTPATIENT
Start: 2020-10-05 | End: 2020-10-05

## 2020-10-05 RX ORDER — SODIUM CHLORIDE 9 MG/ML
1000 INJECTION, SOLUTION INTRAVENOUS
Refills: 0 | Status: DISCONTINUED | OUTPATIENT
Start: 2020-10-05 | End: 2020-10-05

## 2020-10-05 RX ORDER — HYDROMORPHONE HYDROCHLORIDE 2 MG/ML
0.5 INJECTION INTRAMUSCULAR; INTRAVENOUS; SUBCUTANEOUS ONCE
Refills: 0 | Status: DISCONTINUED | OUTPATIENT
Start: 2020-10-05 | End: 2020-10-05

## 2020-10-05 RX ORDER — MORPHINE SULFATE 50 MG/1
4 CAPSULE, EXTENDED RELEASE ORAL ONCE
Refills: 0 | Status: DISCONTINUED | OUTPATIENT
Start: 2020-10-05 | End: 2020-10-05

## 2020-10-05 RX ORDER — POTASSIUM PHOSPHATE, MONOBASIC POTASSIUM PHOSPHATE, DIBASIC 236; 224 MG/ML; MG/ML
15 INJECTION, SOLUTION INTRAVENOUS ONCE
Refills: 0 | Status: COMPLETED | OUTPATIENT
Start: 2020-10-05 | End: 2020-10-05

## 2020-10-05 RX ORDER — LEVOTHYROXINE SODIUM 125 MCG
110 TABLET ORAL AT BEDTIME
Refills: 0 | Status: DISCONTINUED | OUTPATIENT
Start: 2020-10-05 | End: 2020-10-08

## 2020-10-05 RX ORDER — DEXTROSE 50 % IN WATER 50 %
25 SYRINGE (ML) INTRAVENOUS ONCE
Refills: 0 | Status: DISCONTINUED | OUTPATIENT
Start: 2020-10-05 | End: 2020-10-05

## 2020-10-05 RX ORDER — INSULIN LISPRO 100/ML
VIAL (ML) SUBCUTANEOUS
Refills: 0 | Status: DISCONTINUED | OUTPATIENT
Start: 2020-10-05 | End: 2020-10-05

## 2020-10-05 RX ORDER — ONDANSETRON 8 MG/1
4 TABLET, FILM COATED ORAL ONCE
Refills: 0 | Status: COMPLETED | OUTPATIENT
Start: 2020-10-05 | End: 2020-10-05

## 2020-10-05 RX ORDER — CEFTRIAXONE 500 MG/1
1000 INJECTION, POWDER, FOR SOLUTION INTRAMUSCULAR; INTRAVENOUS ONCE
Refills: 0 | Status: COMPLETED | OUTPATIENT
Start: 2020-10-05 | End: 2020-10-05

## 2020-10-05 RX ORDER — DEXTROSE 50 % IN WATER 50 %
15 SYRINGE (ML) INTRAVENOUS ONCE
Refills: 0 | Status: DISCONTINUED | OUTPATIENT
Start: 2020-10-05 | End: 2020-10-05

## 2020-10-05 RX ORDER — HYDROMORPHONE HYDROCHLORIDE 2 MG/ML
1 INJECTION INTRAMUSCULAR; INTRAVENOUS; SUBCUTANEOUS ONCE
Refills: 0 | Status: DISCONTINUED | OUTPATIENT
Start: 2020-10-05 | End: 2020-10-05

## 2020-10-05 RX ORDER — SODIUM CHLORIDE 9 MG/ML
1000 INJECTION, SOLUTION INTRAVENOUS
Refills: 0 | Status: DISCONTINUED | OUTPATIENT
Start: 2020-10-05 | End: 2020-10-07

## 2020-10-05 RX ORDER — DEXTROSE 50 % IN WATER 50 %
12.5 SYRINGE (ML) INTRAVENOUS ONCE
Refills: 0 | Status: DISCONTINUED | OUTPATIENT
Start: 2020-10-05 | End: 2020-10-05

## 2020-10-05 RX ORDER — FENTANYL CITRATE 50 UG/ML
0.5 INJECTION INTRAVENOUS
Qty: 2500 | Refills: 0 | Status: DISCONTINUED | OUTPATIENT
Start: 2020-10-05 | End: 2020-10-06

## 2020-10-05 RX ORDER — ONDANSETRON 8 MG/1
4 TABLET, FILM COATED ORAL EVERY 6 HOURS
Refills: 0 | Status: DISCONTINUED | OUTPATIENT
Start: 2020-10-05 | End: 2020-10-05

## 2020-10-05 RX ORDER — MAGNESIUM SULFATE 500 MG/ML
2 VIAL (ML) INJECTION ONCE
Refills: 0 | Status: COMPLETED | OUTPATIENT
Start: 2020-10-05 | End: 2020-10-05

## 2020-10-05 RX ORDER — DEXTROSE 50 % IN WATER 50 %
25 SYRINGE (ML) INTRAVENOUS ONCE
Refills: 0 | Status: DISCONTINUED | OUTPATIENT
Start: 2020-10-05 | End: 2020-10-12

## 2020-10-05 RX ORDER — SODIUM CHLORIDE 9 MG/ML
1000 INJECTION, SOLUTION INTRAVENOUS
Refills: 0 | Status: DISCONTINUED | OUTPATIENT
Start: 2020-10-05 | End: 2020-10-12

## 2020-10-05 RX ORDER — HEPARIN SODIUM 5000 [USP'U]/ML
5000 INJECTION INTRAVENOUS; SUBCUTANEOUS EVERY 8 HOURS
Refills: 0 | Status: DISCONTINUED | OUTPATIENT
Start: 2020-10-05 | End: 2020-10-12

## 2020-10-05 RX ORDER — PANTOPRAZOLE SODIUM 20 MG/1
40 TABLET, DELAYED RELEASE ORAL DAILY
Refills: 0 | Status: DISCONTINUED | OUTPATIENT
Start: 2020-10-05 | End: 2020-10-12

## 2020-10-05 RX ORDER — SODIUM CHLORIDE 9 MG/ML
250 INJECTION, SOLUTION INTRAVENOUS ONCE
Refills: 0 | Status: COMPLETED | OUTPATIENT
Start: 2020-10-05 | End: 2020-10-05

## 2020-10-05 RX ORDER — DEXTROSE 50 % IN WATER 50 %
15 SYRINGE (ML) INTRAVENOUS ONCE
Refills: 0 | Status: DISCONTINUED | OUTPATIENT
Start: 2020-10-05 | End: 2020-10-12

## 2020-10-05 RX ORDER — PIPERACILLIN AND TAZOBACTAM 4; .5 G/20ML; G/20ML
3.38 INJECTION, POWDER, LYOPHILIZED, FOR SOLUTION INTRAVENOUS EVERY 6 HOURS
Refills: 0 | Status: DISCONTINUED | OUTPATIENT
Start: 2020-10-05 | End: 2020-10-06

## 2020-10-05 RX ORDER — GLUCAGON INJECTION, SOLUTION 0.5 MG/.1ML
1 INJECTION, SOLUTION SUBCUTANEOUS ONCE
Refills: 0 | Status: DISCONTINUED | OUTPATIENT
Start: 2020-10-05 | End: 2020-10-05

## 2020-10-05 RX ADMIN — SODIUM CHLORIDE 1000 MILLILITER(S): 9 INJECTION INTRAMUSCULAR; INTRAVENOUS; SUBCUTANEOUS at 11:04

## 2020-10-05 RX ADMIN — SODIUM CHLORIDE 1000 MILLILITER(S): 9 INJECTION INTRAMUSCULAR; INTRAVENOUS; SUBCUTANEOUS at 12:05

## 2020-10-05 RX ADMIN — CEFTRIAXONE 1000 MILLIGRAM(S): 500 INJECTION, POWDER, FOR SOLUTION INTRAMUSCULAR; INTRAVENOUS at 12:47

## 2020-10-05 RX ADMIN — FLUCONAZOLE 100 MILLIGRAM(S): 150 TABLET ORAL at 21:36

## 2020-10-05 RX ADMIN — SODIUM CHLORIDE 1000 MILLILITER(S): 9 INJECTION INTRAMUSCULAR; INTRAVENOUS; SUBCUTANEOUS at 11:05

## 2020-10-05 RX ADMIN — SODIUM CHLORIDE 110 MILLILITER(S): 9 INJECTION, SOLUTION INTRAVENOUS at 21:36

## 2020-10-05 RX ADMIN — MORPHINE SULFATE 4 MILLIGRAM(S): 50 CAPSULE, EXTENDED RELEASE ORAL at 11:50

## 2020-10-05 RX ADMIN — Medication 500 MILLIGRAM(S): at 13:18

## 2020-10-05 RX ADMIN — ONDANSETRON 4 MILLIGRAM(S): 8 TABLET, FILM COATED ORAL at 11:04

## 2020-10-05 RX ADMIN — SODIUM CHLORIDE 110 MILLILITER(S): 9 INJECTION, SOLUTION INTRAVENOUS at 15:17

## 2020-10-05 RX ADMIN — SODIUM CHLORIDE 1000 MILLILITER(S): 9 INJECTION INTRAMUSCULAR; INTRAVENOUS; SUBCUTANEOUS at 12:00

## 2020-10-05 RX ADMIN — HYDROMORPHONE HYDROCHLORIDE 0.5 MILLIGRAM(S): 2 INJECTION INTRAMUSCULAR; INTRAVENOUS; SUBCUTANEOUS at 12:16

## 2020-10-05 RX ADMIN — SODIUM CHLORIDE 1000 MILLILITER(S): 9 INJECTION INTRAMUSCULAR; INTRAVENOUS; SUBCUTANEOUS at 13:05

## 2020-10-05 RX ADMIN — HYDROMORPHONE HYDROCHLORIDE 1 MILLIGRAM(S): 2 INJECTION INTRAMUSCULAR; INTRAVENOUS; SUBCUTANEOUS at 15:16

## 2020-10-05 RX ADMIN — Medication 110 MICROGRAM(S): at 22:27

## 2020-10-05 RX ADMIN — MORPHINE SULFATE 4 MILLIGRAM(S): 50 CAPSULE, EXTENDED RELEASE ORAL at 11:04

## 2020-10-05 RX ADMIN — PROPOFOL 4.06 MICROGRAM(S)/KG/MIN: 10 INJECTION, EMULSION INTRAVENOUS at 21:00

## 2020-10-05 RX ADMIN — CEFTRIAXONE 100 MILLIGRAM(S): 500 INJECTION, POWDER, FOR SOLUTION INTRAMUSCULAR; INTRAVENOUS at 12:17

## 2020-10-05 RX ADMIN — Medication 100 MILLIGRAM(S): at 12:48

## 2020-10-05 RX ADMIN — Medication 50 GRAM(S): at 23:16

## 2020-10-05 RX ADMIN — Medication 2: at 22:02

## 2020-10-05 RX ADMIN — HYDROMORPHONE HYDROCHLORIDE 0.5 MILLIGRAM(S): 2 INJECTION INTRAMUSCULAR; INTRAVENOUS; SUBCUTANEOUS at 12:53

## 2020-10-05 RX ADMIN — SODIUM CHLORIDE 500 MILLILITER(S): 9 INJECTION, SOLUTION INTRAVENOUS at 22:25

## 2020-10-05 RX ADMIN — PIPERACILLIN AND TAZOBACTAM 200 GRAM(S): 4; .5 INJECTION, POWDER, LYOPHILIZED, FOR SOLUTION INTRAVENOUS at 21:36

## 2020-10-05 RX ADMIN — FENTANYL CITRATE 3.38 MICROGRAM(S)/KG/HR: 50 INJECTION INTRAVENOUS at 22:02

## 2020-10-05 NOTE — ED ADULT TRIAGE NOTE - MODE OF ARRIVAL
Subjective:       Patient ID: Alyx Weir is a 65 y.o. female.    Chief Complaint: Annual Exam; GI Problem; Hypertension; Hyperlipidemia; Diabetes; Coronary Artery Disease; and Anticoagulation    GI Problem   The primary symptoms include abdominal pain, diarrhea, myalgias and arthralgias. Primary symptoms do not include fever, fatigue, nausea, vomiting, dysuria or rash.   The myalgias are not associated with weakness.     The illness does not include chills, constipation or back pain.   Hypertension   Pertinent negatives include no chest pain, headaches, neck pain, palpitations or shortness of breath.   Hyperlipidemia   Associated symptoms include myalgias. Pertinent negatives include no chest pain or shortness of breath.   Diabetes   Pertinent negatives for hypoglycemia include no confusion, dizziness, headaches, nervousness/anxiousness, pallor, seizures, speech difficulty or tremors. Pertinent negatives for diabetes include no chest pain, no fatigue, no polydipsia, no polyphagia, no polyuria and no weakness.   Coronary Artery Disease   Pertinent negatives include no chest pain, chest tightness, dizziness, leg swelling, palpitations or shortness of breath. Risk factors include hyperlipidemia.     Past Medical History:   Diagnosis Date    Anticoagulated on Coumadin     Arm weakness-rotator cuff weakness 11/2/2015    Arthritis     Asthma     Clotting disorder     Coronary artery disease     Deep vein thrombosis     Degenerative disc disease     Diabetes mellitus type I 2011    BS last tested x 1 month not sure what it was.    Heterozygous factor V Leiden mutation     Hx of colonic polyps 11/13/2015    Hyperlipidemia     Hypertension     VIRGIL (obstructive sleep apnea)     Stenosis of right carotid artery 12/13/2016     Past Surgical History:   Procedure Laterality Date    BACK SURGERY      bladder cyst removal      BLADDER SURGERY      CARDIAC CATHETERIZATION  03/2013    mild CAD    COLONOSCOPY  N/A 11/13/2015    Procedure: COLONOSCOPY;  Surgeon: Jas Umanzor III, MD;  Location: [facility] ENDO;  Service: Endoscopy;  Laterality: N/A;    HYSTERECTOMY      24 yrs old    LUMBAR SPINE SURGERY      bone spurs removed    OOPHORECTOMY       Family History   Problem Relation Age of Onset    Heart disease Mother     Hypertension Mother     Cataracts Mother     Hypertension Sister     Glaucoma Sister     Hypertension Brother     Diabetes Father     Diabetes Paternal Uncle     Hypertension Sister     Diabetes Sister     Hypertension Sister     Diabetes Sister     Breast cancer Neg Hx     Colon cancer Neg Hx     Ovarian cancer Neg Hx      Social History     Socioeconomic History    Marital status:      Spouse name: Not on file    Number of children: Not on file    Years of education: Not on file    Highest education level: Not on file   Occupational History    Not on file   Social Needs    Financial resource strain: Not on file    Food insecurity:     Worry: Not on file     Inability: Not on file    Transportation needs:     Medical: Not on file     Non-medical: Not on file   Tobacco Use    Smoking status: Never Smoker    Smokeless tobacco: Never Used   Substance and Sexual Activity    Alcohol use: No    Drug use: No    Sexual activity: Never     Birth control/protection: None   Lifestyle    Physical activity:     Days per week: Not on file     Minutes per session: Not on file    Stress: Not on file   Relationships    Social connections:     Talks on phone: Not on file     Gets together: Not on file     Attends Restorationist service: Not on file     Active member of club or organization: Not on file     Attends meetings of clubs or organizations: Not on file     Relationship status: Not on file   Other Topics Concern    Not on file   Social History Narrative    Dogs in household, no smokers.     Review of Systems   Constitutional: Negative for activity change, appetite change,  chills, diaphoresis, fatigue, fever and unexpected weight change.   HENT: Negative for congestion, drooling, ear discharge, ear pain, facial swelling, hearing loss, mouth sores, nosebleeds, postnasal drip, rhinorrhea, sinus pressure, sneezing, sore throat, tinnitus, trouble swallowing and voice change.    Eyes: Negative for photophobia, redness and visual disturbance.   Respiratory: Negative for apnea, cough, choking, chest tightness, shortness of breath and wheezing.    Cardiovascular: Negative for chest pain, palpitations and leg swelling.   Gastrointestinal: Positive for abdominal pain and diarrhea. Negative for abdominal distention, blood in stool, constipation, nausea, rectal pain and vomiting.        Abdominal cramping last pm with diarrhea----------better today----   Endocrine: Negative for cold intolerance, heat intolerance, polydipsia, polyphagia and polyuria.   Genitourinary: Negative for decreased urine volume, difficulty urinating, dysuria, frequency, genital sores, hematuria, urgency and vaginal discharge.   Musculoskeletal: Positive for arthralgias and myalgias. Negative for back pain, gait problem, joint swelling, neck pain and neck stiffness.   Skin: Negative for color change, pallor, rash and wound.   Allergic/Immunologic: Negative for food allergies and immunocompromised state.   Neurological: Negative for dizziness, tremors, seizures, syncope, speech difficulty, weakness, light-headedness, numbness and headaches.   Hematological: Negative for adenopathy. Does not bruise/bleed easily.   Psychiatric/Behavioral: Negative for agitation, behavioral problems, confusion, decreased concentration, dysphoric mood, hallucinations, self-injury, sleep disturbance and suicidal ideas. The patient is not nervous/anxious and is not hyperactive.    All other systems reviewed and are negative.      Objective:      Physical Exam   Constitutional: She is oriented to person, place, and time. She appears well-developed  and well-nourished. No distress.   HENT:   Head: Normocephalic and atraumatic.   Neck: Normal range of motion. Neck supple. No JVD present. Carotid bruit is not present. No tracheal deviation present. No thyromegaly present.   Cardiovascular: Normal rate, regular rhythm, normal heart sounds and intact distal pulses.   Pulmonary/Chest: Effort normal and breath sounds normal. No respiratory distress. She has no wheezes. She has no rales. She exhibits no tenderness.   Abdominal: Soft. Bowel sounds are normal. She exhibits no distension. There is no tenderness. There is no rebound and no guarding.   Musculoskeletal: Normal range of motion. She exhibits no edema or tenderness.   Lymphadenopathy:     She has no cervical adenopathy.   Neurological: She is alert and oriented to person, place, and time.   Skin: Skin is warm and dry. No rash noted. She is not diaphoretic. No erythema. No pallor.   Psychiatric: She has a normal mood and affect. Her behavior is normal. Judgment and thought content normal.   Nursing note and vitals reviewed.      CMP  Sodium   Date Value Ref Range Status   09/27/2018 139 136 - 145 mmol/L Final     Potassium   Date Value Ref Range Status   09/27/2018 4.3 3.5 - 5.1 mmol/L Final     Chloride   Date Value Ref Range Status   09/27/2018 99 95 - 110 mmol/L Final     CO2   Date Value Ref Range Status   09/27/2018 32 (H) 23 - 29 mmol/L Final     Glucose   Date Value Ref Range Status   09/27/2018 95 70 - 110 mg/dL Final     BUN, Bld   Date Value Ref Range Status   09/27/2018 21 8 - 23 mg/dL Final     Creatinine   Date Value Ref Range Status   09/27/2018 0.9 0.5 - 1.4 mg/dL Final     Calcium   Date Value Ref Range Status   09/27/2018 10.2 8.7 - 10.5 mg/dL Final     Total Protein   Date Value Ref Range Status   09/27/2018 8.0 6.0 - 8.4 g/dL Final     Albumin   Date Value Ref Range Status   09/27/2018 4.0 3.5 - 5.2 g/dL Final     Total Bilirubin   Date Value Ref Range Status   09/27/2018 0.8 0.1 - 1.0 mg/dL  Final     Comment:     For infants and newborns, interpretation of results should be based  on gestational age, weight and in agreement with clinical  observations.  Premature Infant recommended reference ranges:  Up to 24 hours.............<8.0 mg/dL  Up to 48 hours............<12.0 mg/dL  3-5 days..................<15.0 mg/dL  6-29 days.................<15.0 mg/dL       Alkaline Phosphatase   Date Value Ref Range Status   09/27/2018 50 (L) 55 - 135 U/L Final     AST   Date Value Ref Range Status   09/27/2018 19 10 - 40 U/L Final     ALT   Date Value Ref Range Status   09/27/2018 16 10 - 44 U/L Final     Anion Gap   Date Value Ref Range Status   09/27/2018 8 8 - 16 mmol/L Final     eGFR if    Date Value Ref Range Status   09/27/2018 >60.0 >60 mL/min/1.73 m^2 Final     eGFR if non    Date Value Ref Range Status   09/27/2018 >60.0 >60 mL/min/1.73 m^2 Final     Comment:     Calculation used to obtain the estimated glomerular filtration  rate (eGFR) is the CKD-EPI equation.        Lab Results   Component Value Date    WBC 6.57 09/27/2018    HGB 13.6 09/27/2018    HCT 42.7 09/27/2018    MCV 89 09/27/2018     09/27/2018     Lab Results   Component Value Date    CHOL 220 (H) 09/27/2018     Lab Results   Component Value Date    HDL 80 (H) 09/27/2018     Lab Results   Component Value Date    LDLCALC 124.4 09/27/2018     Lab Results   Component Value Date    TRIG 78 09/27/2018     Lab Results   Component Value Date    CHOLHDL 36.4 09/27/2018     Lab Results   Component Value Date    TSH 0.681 09/27/2018     Lab Results   Component Value Date    HGBA1C 6.0 (H) 09/27/2018     Assessment:       1. Type 2 diabetes mellitus without complication, without long-term current use of insulin    2. Pure hypercholesterolemia with target low density lipoprotein (LDL) cholesterol less than 130 mg/dL    3. Heterozygous factor V Leiden mutation    4. Essential hypertension    5. Coronary artery disease  involving native coronary artery of native heart without angina pectoris    6. Anticoagulated on Coumadin    7. Lower abdominal pain    8. Encounter for screening mammogram for breast cancer        Plan:   Type 2 diabetes mellitus without complication, without long-term current use of insulin  -     Hemoglobin A1c; Future; Expected date: 11/21/2019    Pure hypercholesterolemia with target low density lipoprotein (LDL) cholesterol less than 130 mg/dL  -     Lipid panel; Future; Expected date: 11/21/2019    Heterozygous factor V Leiden mutation    Essential hypertension---------follow bp at home and report-----  -     Comprehensive metabolic panel; Future; Expected date: 11/21/2019  -     CBC auto differential; Future; Expected date: 11/21/2019    Coronary artery disease involving native coronary artery of native heart without angina pectoris    Anticoagulated on Coumadin    Lower abdominal pain  -     Urinalysis; Future; Expected date: 11/21/2019  -     Urine culture; Future; Expected date: 11/21/2019  -     X-Ray Abdomen Flat And Erect; Future; Expected date: 11/21/2019    Encounter for screening mammogram for breast cancer  -     Mammo Digital Screening Bilat; Future; Expected date: 11/21/2019    Stable--------------continue current meds     F/u 1 month -bp check-   [facility]

## 2020-10-05 NOTE — ED ADULT NURSE NOTE - OBJECTIVE STATEMENT
Patient presents with significant epigastric pain radiating/wrapping around abdomen to back  (+)Nausea (-)vomiting (-)diarrhea   Denies f/c  (+)dysuria (as of arrival to ED)  SxHx ABD: willem, appy, bilat. oophorectomy Patient presents with significant epigastric pain radiating/wrapping around abdomen to back  (+)Nausea (-)vomiting (-)diarrhea   Denies f/reports chills  (+)dysuria (as of arrival to ED)  SxHx ABD: willem, apppaz, bilat. oophorectomy

## 2020-10-05 NOTE — ED PROVIDER NOTE - OBJECTIVE STATEMENT
75y F with a history of RA, diabetes, chronic back pain, cholecystectomy, appendectomy, and bilateral oophorectomy is coming in with abdominal pain and nausea that woke her out of sleep at 3am. Patient felt normal going to bed and symptoms came on suddenly. Patients states diffuse abdominal pain, unable to localize further, [place] while belly, unable to keep anything down, dry heaving during interview. Patient is unsure if passing gas, hasn't been paying attention. Patient's last BM yesterday was normal, no bloody stool or emesis. Denies fever, chills, cough, chest pain, or urinary symptoms. Patient states pain radiates into back and is having mild dysuria.

## 2020-10-05 NOTE — H&P ADULT - NSHPPHYSICALEXAM_GEN_ALL_CORE
T(C): 36.6 (10-05-20 @ 12:23), Max: 36.6 (10-05-20 @ 10:16)  HR: 61 (10-05-20 @ 15:07) (59 - 65)  BP: 143/71 (10-05-20 @ 15:07) (135/68 - 157/69)  RR: 20 (10-05-20 @ 15:07) (20 - 22)  SpO2: 99% (10-05-20 @ 15:07) (99% - 100%)    GENERAL: Obese female in moderate distress in bed complaining of pain  HEENT: NCAT, MMM  RESP: Nonlabored breathing, No respiratory distress  CARD: Normal rate, Normal peripheral perfusion  GI: Soft, obese, midly distended, mod TTP diffusely greatest in RLQ, +voluntary guarding, no rebound tenderness  EXTREM: WWP, No edema, No gross deformity of extremities  SKIN: No rashes, no lesions  NEURO: AAOx3, No focal motor or sensory deficits  PSYCH: Affect and characteristics of appearance, verbalizations, behaviors are appropriate T(C): 36.6 (10-05-20 @ 12:23), Max: 36.6 (10-05-20 @ 10:16)  HR: 61 (10-05-20 @ 15:07) (59 - 65)  BP: 143/71 (10-05-20 @ 15:07) (135/68 - 157/69)  RR: 20 (10-05-20 @ 15:07) (20 - 22)  SpO2: 99% (10-05-20 @ 15:07) (99% - 100%)    GENERAL: Obese female in moderate distress in bed complaining of pain, dry heaving with bucket at bedside  HEENT: NCAT, MMM  RESP: Nonlabored breathing, No respiratory distress  CARD: Normal rate, Normal peripheral perfusion  GI: Soft, obese, midly distended, mod TTP diffusely greatest in RLQ, +voluntary guarding, no rebound tenderness  EXTREM: WWP, No edema, No gross deformity of extremities  SKIN: No rashes, no lesions  NEURO: AAOx3, No focal motor or sensory deficits  PSYCH: Affect and characteristics of appearance, verbalizations, behaviors are appropriate

## 2020-10-05 NOTE — ED PROVIDER NOTE - CLINICAL SUMMARY MEDICAL DECISION MAKING FREE TEXT BOX
75y F here with diffuse abdominal pain. Will check ct scan to evaluate. Treat symptoms with antiemetics and pain medication, check urine for UTI and reassess.

## 2020-10-05 NOTE — H&P ADULT - NSICDXPASTMEDICALHX_GEN_ALL_CORE_FT
PAST MEDICAL HISTORY:  Cataracts, bilateral     Degenerative disc disease, lumbar     DM type 2 (diabetes mellitus, type 2)     Rheumatoid arthritis

## 2020-10-05 NOTE — H&P ADULT - HISTORY OF PRESENT ILLNESS
75F PMHx RA, DM, hypothyroidism, cataracts, ?neuromuscular disease, PSHx lap willem, lap appy, b/l oophorectomy for ovarian cysts presents with abd pain x 1 day. Reports sudden onset generalized abd pain that woke her up from sleep at 3AM, progressively worsening, 10/10 in severity, a/w PO intolerance this AM and nausea, no vomiting. Had no symptoms yesterday, last BM yesterday normal, unsure of recent flatus. Unsure is she has had colonoscopy. Denies recent fevers/chills, constipation, diarrhea, hematochezia, melena, URI sx, dysuria, CP, SOB. In ED afebrile HR 59 /69 O2 100% on RA. WBC 17.4 (87% PMNs), lactate 4.7 ->2.4 s/p 2L NS. CT showing closed loop SBO in midabdomen w/ dilated ileum up to 3.0 cm with hypoenhancing walls c/f ischemia, no mass visualized. In ED given CTX/Flagyl, 4 morphine, 1.5 dilaudid. NGT placed with immediate return of 500 cc bilious fluid.

## 2020-10-05 NOTE — ED ADULT NURSE NOTE - PMH
Cataracts, bilateral    Degenerative disc disease, lumbar    DM type 2 (diabetes mellitus, type 2)    Rheumatoid arthritis

## 2020-10-05 NOTE — CONSULT NOTE ADULT - SUBJECTIVE AND OBJECTIVE BOX
75F PMHx RA, DM, hypothyroidism, cataracts, ?neuromuscular disease, PSHx lap willem, lap appy, b/l oophorectomy for ovarian cysts presents with abd pain x 1 day. Reports sudden onset generalized abd pain that woke her up from sleep at 3AM, progressively worsening, 10/10 in severity, a/w PO intolerance this AM and nausea, no vomiting. Had no symptoms yesterday, last BM yesterday normal, unsure of recent flatus. Unsure is she has had colonoscopy. Denies recent fevers/chills, constipation, diarrhea, hematochezia, melena, URI sx, dysuria, CP, SOB. In ED afebrile HR 59 /69 O2 100% on RA. WBC 17.4 (87% PMNs), lactate 4.7 ->2.4 s/p 2L NS. CT showing closed loop SBO in midabdomen w/ dilated ileum up to 3.0 cm with hypoenhancing walls c/f ischemia, no mass visualized. In ED given CTX/Flagyl, 4 morphine, 1.5 dilaudid. NGT placed with immediate return of 500 cc bilious fluid.  (05 Oct 2020 15:27)      SICU Addendum:  Pt admitted to the SICU s/p SBR of 120cm small bowel and primary anastomosis, 10L washout, prevena vac placed. The patient is intubated and sedated, requiring no pressor support in stable condition.     PAST MEDICAL & SURGICAL HISTORY:  Degenerative disc disease, lumbar  Cataracts, bilateral  Rheumatoid arthritis  DM type 2 (diabetes mellitus, type 2)  S/P carpal tunnel release  H/O shoulder surgery  History of bilateral oophorectomies  History of appendectomy  History of cholecystectomy  Home medications: codeine PRN, metformin 500 bid, synthroid 137 qd,    MEDICATIONS  (STANDING):  dextrose 5%. 1000 milliLiter(s) (50 mL/Hr) IV Continuous <Continuous>  dextrose 50% Injectable 12.5 Gram(s) IV Push once  dextrose 50% Injectable 25 Gram(s) IV Push once  dextrose 50% Injectable 25 Gram(s) IV Push once  fentaNYL   Infusion 0.5 MICROgram(s)/kG/Hr (3.38 mL/Hr) IV Continuous <Continuous>  fluconAZOLE IVPB 400 milliGRAM(s) IV Intermittent every 24 hours  heparin   Injectable 5000 Unit(s) SubCutaneous every 8 hours  insulin lispro (HumaLOG) corrective regimen sliding scale   SubCutaneous three times a day before meals  lactated ringers. 1000 milliLiter(s) (110 mL/Hr) IV Continuous <Continuous>  levothyroxine Injectable 110 MICROGram(s) IV Push at bedtime  pantoprazole  Injectable 40 milliGRAM(s) IV Push daily  piperacillin/tazobactam IVPB.. 3.375 Gram(s) IV Intermittent every 6 hours  propofol Infusion 10 MICROgram(s)/kG/Min (4.06 mL/Hr) IV Continuous <Continuous>    MEDICATIONS  (PRN):  dextrose 40% Gel 15 Gram(s) Oral once PRN Blood Glucose LESS THAN 70 milliGRAM(s)/deciliter  glucagon  Injectable 1 milliGRAM(s) IntraMuscular once PRN Glucose LESS THAN 70 milligrams/deciliter  ondansetron Injectable 4 milliGRAM(s) IV Push every 6 hours PRN Nausea      Allergies    gabapentin (Rash)  NSAIDs (Unknown)  penicillin (Unknown)  tramadol (Hives)    SOCIAL HISTORY:    FAMILY HISTORY:  No pertinent family history in first degree relatives        REVIEW OF SYSTEMS  No other notable issues other than stated above.       Vital Signs Last 24 Hrs  T(C): 36.4 (05 Oct 2020 17:45), Max: 36.6 (05 Oct 2020 10:16)  T(F): 97.6 (05 Oct 2020 17:45), Max: 97.9 (05 Oct 2020 12:23)  HR: 70 (05 Oct 2020 17:45) (59 - 75)  BP: 131/67 (05 Oct 2020 17:45) (126/72 - 157/69)  BP(mean): --  RR: 18 (05 Oct 2020 17:45) (18 - 22)  SpO2: 98% (05 Oct 2020 17:45) (97% - 100%)    I&O's Summary    05 Oct 2020 07:01  -  05 Oct 2020 21:18  --------------------------------------------------------  IN: 0 mL / OUT: 350 mL / NET: -350 mL        Physical Exam:  General: NAD, resting comfortably  HEENT: NC/AT, EOMI, normal hearing, no carotid bruits  Pulmonary: normal resp effort, CTA-B  Cardiovascular: NSR  Abdominal: soft, NT/ND, no organomegaly  Extremities: WWP, normal strength, no clubbing/cyanosis/edema  Neuro: A/O x 3, CNs II-XII grossly intact, normal sensation, no focal deficits  Pulses: PT/DP 2+ b/l    Lines/drains/tubes:  2 18g PIV  Prevena vac in place    LABS:                        14.3   17.39 )-----------( 337      ( 05 Oct 2020 10:58 )             43.8     10-05    136  |  102  |  20  ----------------------------<  207<H>  3.9   |  20<L>  |  0.62    Ca    8.3<L>      05 Oct 2020 12:56    TPro  5.8<L>  /  Alb  3.4  /  TBili  0.2  /  DBili  x   /  AST  22  /  ALT  19  /  AlkPhos  62  10-05    PT/INR - ( 05 Oct 2020 10:58 )   PT: 11.8 sec;   INR: 0.98          PTT - ( 05 Oct 2020 10:58 )  PTT:32.1 sec  Urinalysis Basic - ( 05 Oct 2020 11:43 )    Color: Yellow / Appearance: Clear / SG: >=1.030 / pH: x  Gluc: x / Ketone: >=80 mg/dL  / Bili: Negative / Urobili: 0.2 E.U./dL   Blood: x / Protein: 100 mg/dL / Nitrite: NEGATIVE   Leuk Esterase: NEGATIVE / RBC: < 5 /HPF / WBC < 5 /HPF   Sq Epi: x / Non Sq Epi: 0-5 /HPF / Bacteria: Present /HPF      CAPILLARY BLOOD GLUCOSE      POCT Blood Glucose.: 261 mg/dL (05 Oct 2020 19:29)    LIVER FUNCTIONS - ( 05 Oct 2020 12:56 )  Alb: 3.4 g/dL / Pro: 5.8 g/dL / ALK PHOS: 62 U/L / ALT: 19 U/L / AST: 22 U/L / GGT: x             Cultures:      RADIOLOGY & ADDITIONAL STUDIES:  < from: CT Abdomen and Pelvis w/ IV Cont (10.05.20 @ 14:02) >    EXAM:  CT ABDOMEN AND PELVIS IC                          PROCEDURE DATE:  10/05/2020          INTERPRETATION:  CT SCAN OF ABDOMEN AND PELVIS    History: Sudden severe diffuse abdominal pain. History of cholecystectomy and appendectomy.    Technique: CT scan of abdomen and pelvis was performed from lung bases through symphysis pubis. Intravenous contrast material was administered. Oral contrast was not utilized, as ordered. Axial, sagittal and coronal reformatted images were reviewed.    Comparison: CTA of the coronary arteries from 5/19/2010.    Findings:    Lower chest: Linear atelectasis left lower lobe.    Liver:  Normal.    Gallbladder: Post cholecystectomy.    Spleen:  Normal.    Pancreas:  Normal.    Adrenal glands:  Normal.    Kidneys: A 0.5 cm low-density lesion in the left kidney is too small to characterize, but probably a cyst. Right kidney unremarkable.    Adenopathy:  No lymphadenopathy in abdomen or pelvis.    Ascites: Moderate ascites and mesenteric edema.    Gastrointestinal tract: Stomach, duodenum, and jejunum unremarkable. There are several dilated loops of proximal ileum, measuring up to 3.0 cm diameter. There is more than one transition point in the mesentery of the mid abdomen, likely due to adhesions, as no mass is visualized. The more distal ileal loops and colon are collapsed. These findings are consistent with a closed loop small bowel obstruction. Several of the dilated ileal loops have hypoenhancing walls, suspicious for bowel ischemia. No abscess or free air.    Vessels: Small calcified plaque aorta and pelvic arteries. The celiac artery, superior mesenteric artery, and inferior mesenteric artery are patent.    Pelvic organs: The bladder, uterus, and adnexa are unremarkable.    Soft tissues: Normal.    Bones: Mild degenerative changes of the spine.    Impression: Dilated small bowel loops with more than one transition point, consistent with closed loop small bowel obstruction, likely due to adhesions. Hypoenhancing small bowel walls with moderate ascites and mesenteric edema, consistent with bowel ischemia.    < end of copied text >

## 2020-10-05 NOTE — BRIEF OPERATIVE NOTE - OPERATION/FINDINGS
Procedure: exploratory laparotomy, reduction of incarcerated SB, FRANCISCO, SBR (120 cm ileum), primary anastomosis Procedure: exploratory laparotomy, reduction of incarcerated SB, FRANCISCO, SBR (120 cm ileum), primary anastomosis    Infraumbilical to suprapubic midline incision made. Upon entering peritoneum, significant turbid fluid encountered which was suctioned. No feculence. Approximately 120 cm of ileum was found to be frankly necrotic, but not perforated. Defect in mesentery identified which was lysed and incarcerated small bowel was reduced. 120 cm of necrotic ileum was resected using ANIVAL 75 mm blue load stapler. Primary anastomosis was created using additional 2 loads of ANIVAL 75 mm blue load stapler. Small bowel run and found to be viable. Small serosal tear identified in cecum which was repaired with Silk Lembert sutures. Fascia was closed with #1 PDS suture. Subcutaneous tissue closed with 2-0 Vicryl and Provena VAC placed. Procedure: exploratory laparotomy, reduction of incarcerated SB, FRANCISCO, SBR (120 cm ileum), primary anastomosis    Infraumbilical to suprapubic midline incision made. Upon entering peritoneum, significant turbid fluid encountered which was suctioned. No feculence. Approximately 120 cm of ileum was found to be frankly necrotic, but not perforated. Defect in mesentery identified which was lysed and incarcerated small bowel was reduced. 120 cm of necrotic ileum was resected using ANIVAL 75 mm blue load stapler. Primary anastomosis was created using additional 2 loads of ANIVAL 75 mm blue load stapler. Small bowel run and found to be viable. Small serosal tear identified in cecum which was repaired with Silk Lembert sutures. Peritoneum irrigated copiously with 10L of NS until clear. Fascia was closed with #1 PDS suture. Subcutaneous tissue closed with 2-0 Vicryl and Provena VAC placed. Procedure: exploratory laparotomy, reduction of incarcerated SB, FRANCISCO, SBR (120 cm ileum), primary anastomosis    Infraumbilical to suprapubic midline incision made. Upon entering peritoneum, significant turbid fluid encountered which was suctioned. No feculence. Lysis of adhesions performed. Small bowel found to be incarcerated in mesenteric defect. Small bowel was reduced and defect repaired. Approximately ileum found to be frankly necrotic, but not perforated. 120 cm of necrotic ileum was resected using ANIVAL 75 mm blue load stapler. Primary side-to-side anastomosis was created using additional 2 loads of ANIVAL 75 mm blue load stapler. Small bowel run and found to be viable. Small serosal tear identified in cecum which was repaired with Silk Lembert sutures. Peritoneum irrigated copiously with 10L of NS until clear. Fascia was closed with #1 PDS suture. Subcutaneous tissue closed with 2-0 Vicryl and Provena VAC placed.

## 2020-10-05 NOTE — H&P ADULT - ASSESSMENT
75F PMHx RA, DM, hypothyroidism, cataracts, ?neuromuscular disease, PSHx lap willem, lap appy, b/l oophorectomy for ovarian cysts presents with abd pain x 1 day, CT revealing closed loop SBO w/ hypoenhancing bowel walls c/f ischemia. AVSS, WBC 17.4 (87% PMNs), lactate 4.7 ->2.4 s/p 2L NS. NGT placed with immediate return of 500 cc bilious fluid.     - Admit to SICU, Dr. Hines  - Added on for OR as class 2, f/u rapid covid test  - Pain/nausea control  - NPO/IVF  - NGT to LIWS, f/u CXR for placement  - F/u preoperative labs/imaging  - ISS  - SCDs/HSQ  - Home synthroid

## 2020-10-05 NOTE — H&P ADULT - NSICDXPASTSURGICALHX_GEN_ALL_CORE_FT
PAST SURGICAL HISTORY:  H/O shoulder surgery     History of appendectomy     History of bilateral oophorectomies     History of cholecystectomy     S/P carpal tunnel release

## 2020-10-05 NOTE — ED ADULT TRIAGE NOTE - CHIEF COMPLAINT QUOTE
Pt BIBA w/ c/o abdominal pain (epigastric region), N but no vomiting. Pt states chills, denies fever.

## 2020-10-05 NOTE — H&P ADULT - NSHPLABSRESULTS_GEN_ALL_CORE
14.3   17.39 )-----------( 337      ( 05 Oct 2020 10:58 )             43.8   10-05    136  |  102  |  20  ----------------------------<  207<H>  3.9   |  20<L>  |  0.62    Ca    8.3<L>      05 Oct 2020 12:56    TPro  5.8<L>  /  Alb  3.4  /  TBili  0.2  /  DBili  x   /  AST  22  /  ALT  19  /  AlkPhos  62  10-05  CT Abdomen and Pelvis w/ IV Cont:   EXAM:  CT ABDOMEN AND PELVIS IC                          PROCEDURE DATE:  10/05/2020          INTERPRETATION:  CT SCAN OF ABDOMEN AND PELVIS    History: Sudden severe diffuse abdominal pain. History of cholecystectomy and appendectomy.    Technique: CT scan of abdomen and pelvis was performed from lung bases through symphysis pubis. Intravenous contrast material was administered. Oral contrast was not utilized, as ordered. Axial, sagittal and coronal reformatted images were reviewed.    Comparison: CTA of the coronary arteries from 5/19/2010.    Findings:    Lower chest: Linear atelectasis left lower lobe.    Liver:  Normal.    Gallbladder: Post cholecystectomy.    Spleen:  Normal.    Pancreas:  Normal.    Adrenal glands:  Normal.    Kidneys: A 0.5 cm low-density lesion in the left kidney is too small to characterize, but probably a cyst. Right kidney unremarkable.    Adenopathy:  No lymphadenopathy in abdomen or pelvis.    Ascites: Moderate ascites and mesenteric edema.    Gastrointestinal tract: Stomach, duodenum, and jejunum unremarkable. There are several dilated loops of proximal ileum, measuring up to 3.0 cm diameter. There is more than one transition point in the mesentery of the mid abdomen, likely due to adhesions, as no mass is visualized. The more distal ileal loops and colon are collapsed. These findings are consistent with a closed loop small bowel obstruction. Several of the dilated ileal loops have hypoenhancing walls, suspicious for bowel ischemia. No abscess or free air.    Vessels: Small calcified plaque aorta and pelvic arteries. The celiac artery, superior mesenteric artery, and inferior mesenteric artery are patent.    Pelvic organs: The bladder, uterus, and adnexa are unremarkable.    Soft tissues: Normal.    Bones: Mild degenerative changes of the spine.    Impression: Dilated small bowel loops with more than one transition point, consistent with closed loop small bowel obstruction, likely due to adhesions. Hypoenhancing small bowel walls with moderate ascites and mesenteric edema, consistent with bowel ischemia.

## 2020-10-05 NOTE — CONSULT NOTE ADULT - ATTENDING COMMENTS
This patient was evaluated with the resident, management decisions were made, see above for the details, I agree with the A/P.  Ms. Mendez is a 74 y/o female with previous abdominal  surgery who presented to the ED with severe abdominal pain and diagnosed with closed loop SBO, he is now sp/p SBO, on the ventilator.  -Abdominal pain  -Closed loop SBO s/p resection  -post procedural respiratory failure  -hypothyroidism  >propofol, fentanyl  >CXR, ABG  >adjust ventilator MV  >PPI  >leave Ricketts catheter; monitor U/O  >on prophylactic antibiotics  >post op labs  >SCDs  See above for the details as d/w the resident.

## 2020-10-05 NOTE — CONSULT NOTE ADULT - SUBJECTIVE AND OBJECTIVE BOX
75F PMHx RA, DM, hypothyroidism, cataracts, ?neuromuscular disease, PSHx lap willem, lap appy, b/l oophorectomy for ovarian cysts, presented to ED w/ abd pain/nv. CT Dilated small bowel loops with more than one transition point, consistent with closed loop small bowel obstruction, likely due to adhesions. Hypoenhancing small bowel walls with moderate ascites and mesenteric edema, consistent with bowel ischemia. labs in ED sig for LA 4.7, got total 3 liters, WBC 17.39, given ceftriaxone/flagyl in ED.   pt transferred to SICU for further care. will be going to OR for exlap.        75F PMHx RA, DM, hypothyroidism, cataracts, ?neuromuscular disease, PSHx lap willem, lap appy, b/l oophorectomy for ovarian cysts, presented to ED w/ abd pain/nv. CT Dilated small bowel loops with more than one transition point, consistent with closed loop small bowel obstruction, likely due to adhesions. Hypoenhancing small bowel walls with moderate ascites and mesenteric edema, consistent with bowel ischemia. labs in ED sig for LA 4.7, got total 3 liters, WBC 17.39, given ceftriaxone/flagyl in ED.   pt transferred to SICU for further care. will be going to OR for exlap.     HOME MEDS: codeine PRN, metformin 500 bid, synthroid 137 qd,        75F PMHx RA, DM, hypothyroidism, cataracts, ?neuromuscular disease, PSHx lap willem, lap appy, b/l oophorectomy for ovarian cysts, presented to ED w/ abd pain/nv. CT Dilated small bowel loops with more than one transition point, consistent with closed loop small bowel obstruction, likely due to adhesions. Hypoenhancing small bowel walls with moderate ascites and mesenteric edema, consistent with bowel ischemia. labs in ED sig for LA 4.7, got total 3 liters, WBC 17.39, given ceftriaxone/flagyl in ED. NGT placed with 500cc bilious output.   pt transferred to SICU for further care. will be going to OR for exlap.     HOME MEDS: codeine PRN, metformin 500 bid, synthroid 137 qd,

## 2020-10-06 DIAGNOSIS — K56.609 UNSPECIFIED INTESTINAL OBSTRUCTION, UNSPECIFIED AS TO PARTIAL VERSUS COMPLETE OBSTRUCTION: ICD-10-CM

## 2020-10-06 LAB
A1C WITH ESTIMATED AVERAGE GLUCOSE RESULT: 6.5 % — HIGH (ref 4–5.6)
ALBUMIN SERPL ELPH-MCNC: 2.5 G/DL — LOW (ref 3.3–5)
ALP SERPL-CCNC: 48 U/L — SIGNIFICANT CHANGE UP (ref 40–120)
ALT FLD-CCNC: 25 U/L — SIGNIFICANT CHANGE UP (ref 10–45)
ANION GAP SERPL CALC-SCNC: 12 MMOL/L — SIGNIFICANT CHANGE UP (ref 5–17)
ANION GAP SERPL CALC-SCNC: 13 MMOL/L — SIGNIFICANT CHANGE UP (ref 5–17)
AST SERPL-CCNC: 33 U/L — SIGNIFICANT CHANGE UP (ref 10–40)
BILIRUB SERPL-MCNC: 0.2 MG/DL — SIGNIFICANT CHANGE UP (ref 0.2–1.2)
BUN SERPL-MCNC: 11 MG/DL — SIGNIFICANT CHANGE UP (ref 7–23)
BUN SERPL-MCNC: 12 MG/DL — SIGNIFICANT CHANGE UP (ref 7–23)
CALCIUM SERPL-MCNC: 7 MG/DL — LOW (ref 8.4–10.5)
CALCIUM SERPL-MCNC: 7.1 MG/DL — LOW (ref 8.4–10.5)
CHLORIDE SERPL-SCNC: 103 MMOL/L — SIGNIFICANT CHANGE UP (ref 96–108)
CHLORIDE SERPL-SCNC: 104 MMOL/L — SIGNIFICANT CHANGE UP (ref 96–108)
CO2 SERPL-SCNC: 17 MMOL/L — LOW (ref 22–31)
CO2 SERPL-SCNC: 19 MMOL/L — LOW (ref 22–31)
CREAT SERPL-MCNC: 0.52 MG/DL — SIGNIFICANT CHANGE UP (ref 0.5–1.3)
CREAT SERPL-MCNC: 0.59 MG/DL — SIGNIFICANT CHANGE UP (ref 0.5–1.3)
CULTURE RESULTS: SIGNIFICANT CHANGE UP
ESTIMATED AVERAGE GLUCOSE: 140 MG/DL — HIGH (ref 68–114)
GLUCOSE SERPL-MCNC: 210 MG/DL — HIGH (ref 70–99)
GLUCOSE SERPL-MCNC: 240 MG/DL — HIGH (ref 70–99)
HCT VFR BLD CALC: 30.7 % — LOW (ref 34.5–45)
HCT VFR BLD CALC: 32.7 % — LOW (ref 34.5–45)
HGB BLD-MCNC: 10.3 G/DL — LOW (ref 11.5–15.5)
HGB BLD-MCNC: 10.6 G/DL — LOW (ref 11.5–15.5)
MAGNESIUM SERPL-MCNC: 1.9 MG/DL — SIGNIFICANT CHANGE UP (ref 1.6–2.6)
MAGNESIUM SERPL-MCNC: 2 MG/DL — SIGNIFICANT CHANGE UP (ref 1.6–2.6)
MCHC RBC-ENTMCNC: 29.4 PG — SIGNIFICANT CHANGE UP (ref 27–34)
MCHC RBC-ENTMCNC: 30.1 PG — SIGNIFICANT CHANGE UP (ref 27–34)
MCHC RBC-ENTMCNC: 32.4 GM/DL — SIGNIFICANT CHANGE UP (ref 32–36)
MCHC RBC-ENTMCNC: 33.6 GM/DL — SIGNIFICANT CHANGE UP (ref 32–36)
MCV RBC AUTO: 89.8 FL — SIGNIFICANT CHANGE UP (ref 80–100)
MCV RBC AUTO: 90.8 FL — SIGNIFICANT CHANGE UP (ref 80–100)
NRBC # BLD: 0 /100 WBCS — SIGNIFICANT CHANGE UP (ref 0–0)
NRBC # BLD: 0 /100 WBCS — SIGNIFICANT CHANGE UP (ref 0–0)
PHOSPHATE SERPL-MCNC: 3 MG/DL — SIGNIFICANT CHANGE UP (ref 2.5–4.5)
PHOSPHATE SERPL-MCNC: 3.1 MG/DL — SIGNIFICANT CHANGE UP (ref 2.5–4.5)
PLATELET # BLD AUTO: 292 K/UL — SIGNIFICANT CHANGE UP (ref 150–400)
PLATELET # BLD AUTO: 299 K/UL — SIGNIFICANT CHANGE UP (ref 150–400)
POTASSIUM SERPL-MCNC: 4.1 MMOL/L — SIGNIFICANT CHANGE UP (ref 3.5–5.3)
POTASSIUM SERPL-MCNC: 4.5 MMOL/L — SIGNIFICANT CHANGE UP (ref 3.5–5.3)
POTASSIUM SERPL-SCNC: 4.1 MMOL/L — SIGNIFICANT CHANGE UP (ref 3.5–5.3)
POTASSIUM SERPL-SCNC: 4.5 MMOL/L — SIGNIFICANT CHANGE UP (ref 3.5–5.3)
PROT SERPL-MCNC: 4.5 G/DL — LOW (ref 6–8.3)
RBC # BLD: 3.42 M/UL — LOW (ref 3.8–5.2)
RBC # BLD: 3.6 M/UL — LOW (ref 3.8–5.2)
RBC # FLD: 13.7 % — SIGNIFICANT CHANGE UP (ref 10.3–14.5)
RBC # FLD: 13.8 % — SIGNIFICANT CHANGE UP (ref 10.3–14.5)
SARS-COV-2 IGG SERPL QL IA: NEGATIVE — SIGNIFICANT CHANGE UP
SARS-COV-2 IGM SERPL IA-ACNC: <3.8 AU/ML — SIGNIFICANT CHANGE UP
SODIUM SERPL-SCNC: 132 MMOL/L — LOW (ref 135–145)
SODIUM SERPL-SCNC: 136 MMOL/L — SIGNIFICANT CHANGE UP (ref 135–145)
SPECIMEN SOURCE: SIGNIFICANT CHANGE UP
WBC # BLD: 11.94 K/UL — HIGH (ref 3.8–10.5)
WBC # BLD: 9.43 K/UL — SIGNIFICANT CHANGE UP (ref 3.8–10.5)
WBC # FLD AUTO: 11.94 K/UL — HIGH (ref 3.8–10.5)
WBC # FLD AUTO: 9.43 K/UL — SIGNIFICANT CHANGE UP (ref 3.8–10.5)

## 2020-10-06 PROCEDURE — 99233 SBSQ HOSP IP/OBS HIGH 50: CPT | Mod: GC

## 2020-10-06 PROCEDURE — 71045 X-RAY EXAM CHEST 1 VIEW: CPT | Mod: 26

## 2020-10-06 RX ORDER — CHLORHEXIDINE GLUCONATE 213 G/1000ML
15 SOLUTION TOPICAL EVERY 12 HOURS
Refills: 0 | Status: DISCONTINUED | OUTPATIENT
Start: 2020-10-06 | End: 2020-10-06

## 2020-10-06 RX ORDER — ACETAMINOPHEN 500 MG
1000 TABLET ORAL ONCE
Refills: 0 | Status: COMPLETED | OUTPATIENT
Start: 2020-10-06 | End: 2020-10-06

## 2020-10-06 RX ORDER — HYDROMORPHONE HYDROCHLORIDE 2 MG/ML
1 INJECTION INTRAMUSCULAR; INTRAVENOUS; SUBCUTANEOUS EVERY 6 HOURS
Refills: 0 | Status: DISCONTINUED | OUTPATIENT
Start: 2020-10-06 | End: 2020-10-08

## 2020-10-06 RX ORDER — HYDROMORPHONE HYDROCHLORIDE 2 MG/ML
0.5 INJECTION INTRAMUSCULAR; INTRAVENOUS; SUBCUTANEOUS EVERY 6 HOURS
Refills: 0 | Status: DISCONTINUED | OUTPATIENT
Start: 2020-10-06 | End: 2020-10-08

## 2020-10-06 RX ORDER — PIPERACILLIN AND TAZOBACTAM 4; .5 G/20ML; G/20ML
3.38 INJECTION, POWDER, LYOPHILIZED, FOR SOLUTION INTRAVENOUS EVERY 6 HOURS
Refills: 0 | Status: DISCONTINUED | OUTPATIENT
Start: 2020-10-06 | End: 2020-10-09

## 2020-10-06 RX ADMIN — PIPERACILLIN AND TAZOBACTAM 200 GRAM(S): 4; .5 INJECTION, POWDER, LYOPHILIZED, FOR SOLUTION INTRAVENOUS at 15:39

## 2020-10-06 RX ADMIN — SODIUM CHLORIDE 110 MILLILITER(S): 9 INJECTION, SOLUTION INTRAVENOUS at 06:41

## 2020-10-06 RX ADMIN — FLUCONAZOLE 100 MILLIGRAM(S): 150 TABLET ORAL at 19:29

## 2020-10-06 RX ADMIN — POTASSIUM PHOSPHATE, MONOBASIC POTASSIUM PHOSPHATE, DIBASIC 62.5 MILLIMOLE(S): 236; 224 INJECTION, SOLUTION INTRAVENOUS at 00:08

## 2020-10-06 RX ADMIN — PIPERACILLIN AND TAZOBACTAM 200 GRAM(S): 4; .5 INJECTION, POWDER, LYOPHILIZED, FOR SOLUTION INTRAVENOUS at 03:04

## 2020-10-06 RX ADMIN — Medication 2: at 21:30

## 2020-10-06 RX ADMIN — HYDROMORPHONE HYDROCHLORIDE 0.5 MILLIGRAM(S): 2 INJECTION INTRAMUSCULAR; INTRAVENOUS; SUBCUTANEOUS at 09:36

## 2020-10-06 RX ADMIN — PIPERACILLIN AND TAZOBACTAM 200 GRAM(S): 4; .5 INJECTION, POWDER, LYOPHILIZED, FOR SOLUTION INTRAVENOUS at 21:28

## 2020-10-06 RX ADMIN — Medication 1000 MILLIGRAM(S): at 11:20

## 2020-10-06 RX ADMIN — Medication 2: at 11:45

## 2020-10-06 RX ADMIN — PANTOPRAZOLE SODIUM 40 MILLIGRAM(S): 20 TABLET, DELAYED RELEASE ORAL at 11:40

## 2020-10-06 RX ADMIN — Medication 400 MILLIGRAM(S): at 10:50

## 2020-10-06 RX ADMIN — HEPARIN SODIUM 5000 UNIT(S): 5000 INJECTION INTRAVENOUS; SUBCUTANEOUS at 05:51

## 2020-10-06 RX ADMIN — Medication 110 MICROGRAM(S): at 21:31

## 2020-10-06 RX ADMIN — Medication 4: at 06:03

## 2020-10-06 RX ADMIN — HYDROMORPHONE HYDROCHLORIDE 0.5 MILLIGRAM(S): 2 INJECTION INTRAMUSCULAR; INTRAVENOUS; SUBCUTANEOUS at 18:03

## 2020-10-06 RX ADMIN — CHLORHEXIDINE GLUCONATE 15 MILLILITER(S): 213 SOLUTION TOPICAL at 05:51

## 2020-10-06 RX ADMIN — HYDROMORPHONE HYDROCHLORIDE 0.5 MILLIGRAM(S): 2 INJECTION INTRAMUSCULAR; INTRAVENOUS; SUBCUTANEOUS at 09:46

## 2020-10-06 RX ADMIN — Medication 2: at 17:22

## 2020-10-06 RX ADMIN — HEPARIN SODIUM 5000 UNIT(S): 5000 INJECTION INTRAVENOUS; SUBCUTANEOUS at 21:31

## 2020-10-06 RX ADMIN — PIPERACILLIN AND TAZOBACTAM 200 GRAM(S): 4; .5 INJECTION, POWDER, LYOPHILIZED, FOR SOLUTION INTRAVENOUS at 08:39

## 2020-10-06 RX ADMIN — HEPARIN SODIUM 5000 UNIT(S): 5000 INJECTION INTRAVENOUS; SUBCUTANEOUS at 13:09

## 2020-10-06 RX ADMIN — HYDROMORPHONE HYDROCHLORIDE 0.5 MILLIGRAM(S): 2 INJECTION INTRAMUSCULAR; INTRAVENOUS; SUBCUTANEOUS at 18:00

## 2020-10-06 NOTE — PHYSICAL THERAPY INITIAL EVALUATION ADULT - DIAGNOSIS, PT EVAL
5A: Primary Prevention/Risk Reduction for Loss of Balance and Falling, 4I: Impaired Joint Mobility, Motor Function, Muscle Performance, and Range of Motion Associated with Bony or Soft Tissue Surgery

## 2020-10-06 NOTE — CONSULT NOTE ADULT - SUBJECTIVE AND OBJECTIVE BOX
HPI:  75F PMHx RA, DM, hypothyroidism, cataracts, ?neuromuscular disease, PSHx lap willem, lap appy, b/l oophorectomy for ovarian cysts presents with abd pain x 1 day. Reports sudden onset generalized abd pain that woke her up from sleep at 3AM, progressively worsening, 10/10 in severity, a/w PO intolerance this AM and nausea, no vomiting. Had no symptoms yesterday, last BM yesterday normal, unsure of recent flatus. Unsure is she has had colonoscopy. Denies recent fevers/chills, constipation, diarrhea, hematochezia, melena, URI sx, dysuria, CP, SOB. In ED afebrile HR 59 /69 O2 100% on RA. WBC 17.4 (87% PMNs), lactate 4.7 ->2.4 s/p 2L NS. CT showing closed loop SBO in midabdomen w/ dilated ileum up to 3.0 cm with hypoenhancing walls c/f ischemia, no mass visualized. In ED given CTX/Flagyl, 4 morphine, 1.5 dilaudid. NGT placed with immediate return of 500 cc bilious fluid.   CT A?P shove SBO and ischemia and she underwent exploratory laparotomy, reduction of incarcerated SB, FRANCISCO, SBR (120 cm ileum), primary anastomosis      PAST MEDICAL & SURGICAL HISTORY:  Degenerative disc disease, lumbar    Cataracts, bilateral    Rheumatoid arthritis    DM type 2 (diabetes mellitus, type 2)    S/P carpal tunnel release    H/O shoulder surgery    History of bilateral oophorectomies    History of appendectomy    History of cholecystectomy          REVIEW OF SYSTEMS:    General:  weakness; no fevers, no chills  Skin/Breast: no rash  Respiratory and Thorax: no SOB, no cough  Cardiovascular:	No chest pain  Gastrointestinal:	 no nausea, vomiting , diarrhea  Genitourinary: no dysuria, no difficulty urinating, no hematuria  Musculoskeletal:	no weakness, no joint swelling/pain  Neurological: no focal weakness/numbness, mils confusion  Endocrine: no polyuria, no polydipsia      ANTIBIOTICS:  MEDICATIONS  (STANDING):  dextrose 5%. 1000 milliLiter(s) (50 mL/Hr) IV Continuous <Continuous>  dextrose 50% Injectable 12.5 Gram(s) IV Push once  dextrose 50% Injectable 25 Gram(s) IV Push once  dextrose 50% Injectable 25 Gram(s) IV Push once  fluconAZOLE IVPB 400 milliGRAM(s) IV Intermittent every 24 hours  heparin   Injectable 5000 Unit(s) SubCutaneous every 8 hours  insulin lispro (HumaLOG) corrective regimen sliding scale   SubCutaneous Before meals and at bedtime  lactated ringers. 1000 milliLiter(s) (110 mL/Hr) IV Continuous <Continuous>  levothyroxine Injectable 110 MICROGram(s) IV Push at bedtime  pantoprazole  Injectable 40 milliGRAM(s) IV Push daily  piperacillin/tazobactam IVPB.. 3.375 Gram(s) IV Intermittent every 6 hours    MEDICATIONS  (PRN):  dextrose 40% Gel 15 Gram(s) Oral once PRN Blood Glucose LESS THAN 70 milliGRAM(s)/deciliter  glucagon  Injectable 1 milliGRAM(s) IntraMuscular once PRN Glucose LESS THAN 70 milligrams/deciliter  HYDROmorphone  Injectable 1 milliGRAM(s) IV Push every 6 hours PRN Severe Pain (7 - 10)  HYDROmorphone  Injectable 0.5 milliGRAM(s) IV Push every 6 hours PRN Moderate Pain (4 - 6)  ondansetron Injectable 4 milliGRAM(s) IV Push every 6 hours PRN Nausea      Allergies: gabapentin (Rash), NSAIDs (Unknown), penicillin ( Rash in childhood), tramadol (Hives)    SOCIAL HISTORY: no smoking, no ETOH    FAMILY HISTORY:  No pertinent family history in first degree relatives        Vital Signs Last 24 Hrs  T(C): 37.1 (06 Oct 2020 17:20), Max: 37.6 (06 Oct 2020 09:09)  T(F): 98.8 (06 Oct 2020 17:20), Max: 99.6 (06 Oct 2020 09:09)  HR: 103 (06 Oct 2020 18:00) (66 - 107)  BP: 118/67 (06 Oct 2020 18:00) (92/48 - 160/71)  BP(mean): 84 (06 Oct 2020 18:00) (64 - 102)  RR: 16 (06 Oct 2020 18:00) (12 - 26)  SpO2: 99% (06 Oct 2020 18:00) (95% - 100%)    10-05-20 @ 07:01  -  10-06-20 @ 07:00  --------------------------------------------------------  IN: 2233.6 mL / OUT: 1245 mL / NET: 988.6 mL    10-06-20 @ 07:01  -  10-06-20 @ 18:52  --------------------------------------------------------  IN: 1400 mL / OUT: 638 mL / NET: 762 mL        PHYSICAL EXAM:  Constitutional:Well-developed, well nourished  Eyes:TYSON, EOMI  Ear/Nose/Throat: no oral lesion, no sinus tenderness on percussion	  Neck:no JVD, no lymphadenopathy, supple  Respiratory: CTA sridhar  Cardiovascular: S1S2 RRR, no murmurs  Gastrointestinal: postop tenderness, (-) BS, no HSM  Extremities: noedema, (+) DJD  Vascular: DP Pulse: right normal; left normal      LABS:                        10.3   11.94 )-----------( 299      ( 06 Oct 2020 05:40 )             30.7     10-06    136  |  104  |  12  ----------------------------<  240<H>  4.5   |  19<L>  |  0.59    Ca    7.0<L>      06 Oct 2020 05:40  Phos  3.1     10-06  Mg     1.9     10-06    TPro  4.5<L>  /  Alb  2.5<L>  /  TBili  0.2  /  DBili  x   /  AST  33  /  ALT  25  /  AlkPhos  48  10-06    PT/INR - ( 05 Oct 2020 21:31 )   PT: 13.7 sec;   INR: 1.15          PTT - ( 05 Oct 2020 21:31 )  PTT:28.9 sec  Urinalysis Basic - ( 05 Oct 2020 11:43 )    Color: Yellow / Appearance: Clear / SG: >=1.030 / pH: x  Gluc: x / Ketone: >=80 mg/dL  / Bili: Negative / Urobili: 0.2 E.U./dL   Blood: x / Protein: 100 mg/dL / Nitrite: NEGATIVE   Leuk Esterase: NEGATIVE / RBC: < 5 /HPF / WBC < 5 /HPF   Sq Epi: x / Non Sq Epi: 0-5 /HPF / Bacteria: Present /HPF        MICROBIOLOGY:  Culture - Body Fluid with Gram Stain (10.06.20 @ 01:41)    Gram Stain:   Rare White blood cells  Rare Gram Positive Cocci    Specimen Source: .Body Fluid Intraabdominal Fluid #1    Culture - Blood (10.05.20 @ 13:30)    Specimen Source: .Blood Blood    Culture Results:   No growth at 1 day.    RADIOLOGY & ADDITIONAL STUDIES:  Impression: Dilated small bowel loops with more than one transition point, consistent with closed loop small bowel obstruction, likely due to adhesions. Hypoenhancing small bowel walls with moderate ascites and mesenteric edema, consistent with bowel ischemia.

## 2020-10-06 NOTE — PROGRESS NOTE ADULT - SUBJECTIVE AND OBJECTIVE BOX
S: Pt seen and examined. Pt s/p ex lap, sbr, primary anastomosis, patient kept intubated overnight. No acute events.     MEDICATIONS  (STANDING):  chlorhexidine 0.12% Liquid 15 milliLiter(s) Oral Mucosa every 12 hours  dextrose 5%. 1000 milliLiter(s) (50 mL/Hr) IV Continuous <Continuous>  dextrose 50% Injectable 12.5 Gram(s) IV Push once  dextrose 50% Injectable 25 Gram(s) IV Push once  dextrose 50% Injectable 25 Gram(s) IV Push once  fluconAZOLE IVPB 400 milliGRAM(s) IV Intermittent every 24 hours  heparin   Injectable 5000 Unit(s) SubCutaneous every 8 hours  insulin lispro (HumaLOG) corrective regimen sliding scale   SubCutaneous Before meals and at bedtime  lactated ringers. 1000 milliLiter(s) (110 mL/Hr) IV Continuous <Continuous>  levothyroxine Injectable 110 MICROGram(s) IV Push at bedtime  pantoprazole  Injectable 40 milliGRAM(s) IV Push daily  piperacillin/tazobactam IVPB.. 3.375 Gram(s) IV Intermittent every 6 hours    MEDICATIONS  (PRN):  dextrose 40% Gel 15 Gram(s) Oral once PRN Blood Glucose LESS THAN 70 milliGRAM(s)/deciliter  glucagon  Injectable 1 milliGRAM(s) IntraMuscular once PRN Glucose LESS THAN 70 milligrams/deciliter  HYDROmorphone  Injectable 1 milliGRAM(s) IV Push every 6 hours PRN Severe Pain (7 - 10)  HYDROmorphone  Injectable 0.5 milliGRAM(s) IV Push every 6 hours PRN Moderate Pain (4 - 6)  ondansetron Injectable 4 milliGRAM(s) IV Push every 6 hours PRN Nausea      LABS:                        10.3   11.94 )-----------( 299      ( 06 Oct 2020 05:40 )             30.7     10-06    136  |  104  |  12  ----------------------------<  240<H>  4.5   |  19<L>  |  0.59    Ca    7.0<L>      06 Oct 2020 05:40  Phos  3.1     10-06  Mg     1.9     10-06    TPro  4.5<L>  /  Alb  2.5<L>  /  TBili  0.2  /  DBili  x   /  AST  33  /  ALT  25  /  AlkPhos  48  10-06    PT/INR - ( 05 Oct 2020 21:31 )   PT: 13.7 sec;   INR: 1.15          PTT - ( 05 Oct 2020 21:31 )  PTT:28.9 sec  Urinalysis Basic - ( 05 Oct 2020 11:43 )    Color: Yellow / Appearance: Clear / SG: >=1.030 / pH: x  Gluc: x / Ketone: >=80 mg/dL  / Bili: Negative / Urobili: 0.2 E.U./dL   Blood: x / Protein: 100 mg/dL / Nitrite: NEGATIVE   Leuk Esterase: NEGATIVE / RBC: < 5 /HPF / WBC < 5 /HPF   Sq Epi: x / Non Sq Epi: 0-5 /HPF / Bacteria: Present /HPF          Vital Signs Last 24 Hrs  T(C): 36.7 (06 Oct 2020 04:00), Max: 36.7 (06 Oct 2020 04:00)  T(F): 98 (06 Oct 2020 04:00), Max: 98 (06 Oct 2020 04:00)  HR: 94 (06 Oct 2020 06:00) (59 - 94)  BP: 121/56 (06 Oct 2020 06:00) (96/49 - 160/71)  BP(mean): 80 (06 Oct 2020 06:00) (71 - 102)  RR: 14 (06 Oct 2020 06:00) (12 - 22)  SpO2: 100% (06 Oct 2020 06:00) (97% - 100%)      I&O's Summary    05 Oct 2020 07:01  -  06 Oct 2020 07:00  --------------------------------------------------------  IN: 2123.6 mL / OUT: 1210 mL / NET: 913.6 mL      I&O's Detail    05 Oct 2020 07:01  -  06 Oct 2020 07:00  --------------------------------------------------------  IN:    FentaNYL: 31.8 mL    IV PiggyBack: 100 mL    IV PiggyBack: 600 mL    Lactated Ringers: 1100 mL    Lactated Ringers Bolus: 250 mL    Propofol: 41.8 mL  Total IN: 2123.6 mL    OUT:    Indwelling Catheter - Urethral (mL): 860 mL    VAC (Vacuum Assisted Closure) System (mL): 0 mL    Voided (mL): 350 mL  Total OUT: 1210 mL    Total NET: 913.6 mL          General Appearance: Appears well, NAD, intubated, awake  Neck: Supple  Chest: Equal expansion bilaterally  CV: Pulse regular presently  Abdomen: Soft, nontense, appropriate incisional tenderness, dressings clean and dry and intact  Extremities: SCD's in place, mild edema in lower extremities R>L  .  .  .  .  .

## 2020-10-06 NOTE — PHYSICAL THERAPY INITIAL EVALUATION ADULT - PERTINENT HX OF CURRENT PROBLEM, REHAB EVAL
75F PMHx RA, DM, hypothyroidism, cataracts, ?neuromuscular disease, PSHx lap willem, lap appy, b/l oophorectomy for ovarian cysts a/w SBO, questionable bowel ischemia, now s/p exploratory laparotomy, SBR and primary anastomosis

## 2020-10-06 NOTE — PROGRESS NOTE ADULT - ATTENDING COMMENTS
Abdomen distended, soft, BS-, Flatus-, Bm-, VAC in place, working well, NPO, IVF, IV AAX, DVT prophylaxis, F/U LABS, VS.

## 2020-10-06 NOTE — PROGRESS NOTE ADULT - ASSESSMENT
A/P: 75F PMHx RA, DM, hypothyroidism, cataracts, ?neuromuscular disease, PSHx lap willem, lap appy, b/l oophorectomy for ovarian cysts a/w SBO, questionable bowel ischemia, now s/p exploratory laparotomy, SBR and primary anastomosis in stable condition.    NEURO: propofol, fentanyl  CV: stable, goal of MAP >65  PULM: AC  GI/FEN: NPO  : alfredo  ENDO: ISS, synthroid  ID: [email or web address] (10/5-), fluconazole (10/5-), f/u consult  PPX: SCDs, SQH. protonix  LINES: PIVs x2  WOUNDS/DRAINS: Prevena vac

## 2020-10-06 NOTE — PHYSICAL THERAPY INITIAL EVALUATION ADULT - IMPAIRED TRANSFERS: SIT/STAND, REHAB EVAL
impaired balance/impaired postural control/decreased ROM/narrow base of support/decreased strength/pain

## 2020-10-06 NOTE — PHYSICAL THERAPY INITIAL EVALUATION ADULT - CRITERIA FOR SKILLED THERAPEUTIC INTERVENTIONS
rehab potential/therapy frequency/functional limitations in following categories/risk reduction/prevention/anticipated discharge recommendation/impairments found/predicted duration of therapy intervention

## 2020-10-06 NOTE — PHYSICAL THERAPY INITIAL EVALUATION ADULT - ADDITIONAL COMMENTS
Pt lives in elevator apartment without stairs, alone. Pt uses cane for outdoor ambulation,  no device for household amb. Pt was attending OP PT 2x/week. Uses car service to get to appointments. Pt reports 1 fall tripping x6 months.  R handed, 1 block amb tolerance without rest.

## 2020-10-06 NOTE — PHYSICAL THERAPY INITIAL EVALUATION ADULT - GENERAL OBSERVATIONS, REHAB EVAL
Spoke to FRANCK Terry, pt cleared for PT. POD#1 ex lap and SBR with h/o RA. Pt rcvd OOB to chair, +[place], +NPWT, +alfredo, +heplock. Pt agreeable to PT, reports weakness/stiffness. Tolerated session fairly, demo maxA transfers, amb 10ft x2 Ever with RW. FIM gait=1.

## 2020-10-06 NOTE — PHYSICAL THERAPY INITIAL EVALUATION ADULT - GAIT DEVIATIONS NOTED, PT EVAL
increased time in double stance/decreased paula/VCs for task breakdown/decreased step length/decreased stride length

## 2020-10-06 NOTE — CONSULT NOTE ADULT - PROBLEM SELECTOR RECOMMENDATION 9
1) Patient is tolerating Zosyn well although she reports rash to penicillin in childhood  2) Continue Zosyn 3.375gr IV q6h; added Fluconazole 400mg IV q24h  3) Pending OR culture

## 2020-10-06 NOTE — PROGRESS NOTE ADULT - SUBJECTIVE AND OBJECTIVE BOX
INTERVAL HPI/OVERNIGHT EVENTS:   SURGERY ATTENDING    STATUS POST:      Exploratory Laparotomy, Lysis of adhesions, Reduction of incarcerated strangulated internal hernia, Small bowel resection 120 cm of ileum with side to side functional end to end anastomosis, Abdominal washout, Drainage, Closure.    POST OPERATIVE DAY #: 1    SUBJECTIVE:  Flatus: [ ] YES [x ] NO             Bowel Movement: [ ] YES [x ] NO  Pain (0-10):         3   Pain Control Adequate: [x ] YES [ ] NO  Nausea: [ ] YES [x ] NO            Vomiting: [ ] YES [x ] NO  Diarrhea: [ ] YES [x ] NO         Constipation: [ ] YES [x ] NO     Chest Pain: [ ] YES [x ] NO    SOB:  [ ] YES [x ] NO    MEDICATIONS  (STANDING):  dextrose 5%. 1000 milliLiter(s) (50 mL/Hr) IV Continuous <Continuous>  dextrose 50% Injectable 12.5 Gram(s) IV Push once  dextrose 50% Injectable 25 Gram(s) IV Push once  dextrose 50% Injectable 25 Gram(s) IV Push once  fluconAZOLE IVPB 400 milliGRAM(s) IV Intermittent every 24 hours  heparin   Injectable 5000 Unit(s) SubCutaneous every 8 hours  insulin lispro (HumaLOG) corrective regimen sliding scale   SubCutaneous Before meals and at bedtime  lactated ringers. 1000 milliLiter(s) (110 mL/Hr) IV Continuous <Continuous>  levothyroxine Injectable 110 MICROGram(s) IV Push at bedtime  pantoprazole  Injectable 40 milliGRAM(s) IV Push daily  piperacillin/tazobactam IVPB.. 3.375 Gram(s) IV Intermittent every 6 hours    MEDICATIONS  (PRN):  dextrose 40% Gel 15 Gram(s) Oral once PRN Blood Glucose LESS THAN 70 milliGRAM(s)/deciliter  glucagon  Injectable 1 milliGRAM(s) IntraMuscular once PRN Glucose LESS THAN 70 milligrams/deciliter  HYDROmorphone  Injectable 1 milliGRAM(s) IV Push every 6 hours PRN Severe Pain (7 - 10)  HYDROmorphone  Injectable 0.5 milliGRAM(s) IV Push every 6 hours PRN Moderate Pain (4 - 6)  ondansetron Injectable 4 milliGRAM(s) IV Push every 6 hours PRN Nausea      Vital Signs Last 24 Hrs  T(C): 37.1 (06 Oct 2020 17:20), Max: 37.6 (06 Oct 2020 09:09)  T(F): 98.8 (06 Oct 2020 17:20), Max: 99.6 (06 Oct 2020 09:09)  HR: 103 (06 Oct 2020 18:00) (66 - 107)  BP: 118/67 (06 Oct 2020 18:00) (92/48 - 160/71)  BP(mean): 84 (06 Oct 2020 18:00) (64 - 102)  RR: 16 (06 Oct 2020 18:00) (12 - 26)  SpO2: 99% (06 Oct 2020 18:00) (95% - 100%)    PHYSICAL EXAM:      Constitutional:    Eyes:    ENMT:    Neck:    Breasts:    Back:    Respiratory:    Cardiovascular:    Gastrointestinal:    Genitourinary:    Rectal:    Extremities:    Vascular:    Neurological:    Skin:    Lymph Nodes:    Musculoskeletal:    Psychiatric:        I&O's Detail    05 Oct 2020 07:01  -  06 Oct 2020 07:00  --------------------------------------------------------  IN:    FentaNYL: 31.8 mL    IV PiggyBack: 100 mL    IV PiggyBack: 600 mL    Lactated Ringers: 1210 mL    Lactated Ringers Bolus: 250 mL    Propofol: 41.8 mL  Total IN: 2233.6 mL    OUT:    Indwelling Catheter - Urethral (mL): 895 mL    VAC (Vacuum Assisted Closure) System (mL): 0 mL    Voided (mL): 350 mL  Total OUT: 1245 mL    Total NET: 988.6 mL      06 Oct 2020 07:01  -  06 Oct 2020 19:14  --------------------------------------------------------  IN:    IV PiggyBack: 300 mL    Lactated Ringers: 1100 mL  Total IN: 1400 mL    OUT:    Indwelling Catheter - Urethral (mL): 638 mL  Total OUT: 638 mL    Total NET: 762 mL          LABS:                        10.3   11.94 )-----------( 299      ( 06 Oct 2020 05:40 )             30.7     10-06    136  |  104  |  12  ----------------------------<  240<H>  4.5   |  19<L>  |  0.59    Ca    7.0<L>      06 Oct 2020 05:40  Phos  3.1     10-06  Mg     1.9     10-06    TPro  4.5<L>  /  Alb  2.5<L>  /  TBili  0.2  /  DBili  x   /  AST  33  /  ALT  25  /  AlkPhos  48  10-06    PT/INR - ( 05 Oct 2020 21:31 )   PT: 13.7 sec;   INR: 1.15          PTT - ( 05 Oct 2020 21:31 )  PTT:28.9 sec  Urinalysis Basic - ( 05 Oct 2020 11:43 )    Color: Yellow / Appearance: Clear / SG: >=1.030 / pH: x  Gluc: x / Ketone: >=80 mg/dL  / Bili: Negative / Urobili: 0.2 E.U./dL   Blood: x / Protein: 100 mg/dL / Nitrite: NEGATIVE   Leuk Esterase: NEGATIVE / RBC: < 5 /HPF / WBC < 5 /HPF   Sq Epi: x / Non Sq Epi: 0-5 /HPF / Bacteria: Present /HPF        RADIOLOGY & ADDITIONAL STUDIES:

## 2020-10-06 NOTE — PROGRESS NOTE ADULT - SUBJECTIVE AND OBJECTIVE BOX
Pt s/p POD1 ex lap, SBR, primary anastomosis,   The patient had no issues overnight, bolused 250 cc LR around 22:00 PM for one episode of hypotension.  Denies abdominal pain, nausea or vomiting.    UOP is adequate   AVSS     MEDICATIONS  (STANDING):  chlorhexidine 0.12% Liquid 15 milliLiter(s) Oral Mucosa every 12 hours  dextrose 5%. 1000 milliLiter(s) (50 mL/Hr) IV Continuous <Continuous>  dextrose 50% Injectable 12.5 Gram(s) IV Push once  dextrose 50% Injectable 25 Gram(s) IV Push once  dextrose 50% Injectable 25 Gram(s) IV Push once  fluconAZOLE IVPB 400 milliGRAM(s) IV Intermittent every 24 hours  heparin   Injectable 5000 Unit(s) SubCutaneous every 8 hours  insulin lispro (HumaLOG) corrective regimen sliding scale   SubCutaneous Before meals and at bedtime  lactated ringers. 1000 milliLiter(s) (110 mL/Hr) IV Continuous <Continuous>  levothyroxine Injectable 110 MICROGram(s) IV Push at bedtime  pantoprazole  Injectable 40 milliGRAM(s) IV Push daily  piperacillin/tazobactam IVPB.. 3.375 Gram(s) IV Intermittent every 6 hours    MEDICATIONS  (PRN):  dextrose 40% Gel 15 Gram(s) Oral once PRN Blood Glucose LESS THAN 70 milliGRAM(s)/deciliter  glucagon  Injectable 1 milliGRAM(s) IntraMuscular once PRN Glucose LESS THAN 70 milligrams/deciliter  HYDROmorphone  Injectable 1 milliGRAM(s) IV Push every 6 hours PRN Severe Pain (7 - 10)  HYDROmorphone  Injectable 0.5 milliGRAM(s) IV Push every 6 hours PRN Moderate Pain (4 - 6)  ondansetron Injectable 4 milliGRAM(s) IV Push every 6 hours PRN Nausea      I&O's Summary    05 Oct 2020 07:01  -  06 Oct 2020 07:00  --------------------------------------------------------  IN: 2233.6 mL / OUT: 1245 mL / NET: 988.6 mL    06 Oct 2020 07:01  -  06 Oct 2020 07:27  --------------------------------------------------------  IN: 110 mL / OUT: 0 mL / NET: 110 mL        Vital Signs Last 24 Hrs  T(C): 37.1 (06 Oct 2020 07:00), Max: 37.1 (06 Oct 2020 07:00)  T(F): 98.7 (06 Oct 2020 07:00), Max: 98.7 (06 Oct 2020 07:00)  HR: 97 (06 Oct 2020 07:00) (59 - 97)  BP: 114/68 (06 Oct 2020 07:00) (96/49 - 160/71)  BP(mean): 82 (06 Oct 2020 07:00) (71 - 102)  RR: 20 (06 Oct 2020 07:00) (12 - 22)  SpO2: 100% (06 Oct 2020 07:00) (97% - 100%)    Gen: NAD, resting comfortably in bed  Pulm: Good inspiratory effort, nonlabored breathing  C/V: NSR  Abd: Soft, appropriately tender, nondistended. No rebound, no guarding. Incisional VAC not bringing anything  Extrem: WWP, no edema                          10.3   11.94 )-----------( 299      ( 06 Oct 2020 05:40 )             30.7   10-06    136  |  104  |  12  ----------------------------<  240<H>  4.5   |  19<L>  |  0.59    Ca    7.0<L>      06 Oct 2020 05:40  Phos  3.1     10-06  Mg     1.9     10-06    TPro  4.5<L>  /  Alb  2.5<L>  /  TBili  0.2  /  DBili  x   /  AST  33  /  ALT  25  /  AlkPhos  48  10-06       Pt s/p POD1 ex lap, SBR, primary anastomosis,   The patient had no issues overnight, bolused 250 cc LR around 22:00 PM for one episode of hypotension.  Denies abdominal pain, nausea or vomiting.    UOP is adequate   AVSS     MEDICATIONS  (STANDING):  chlorhexidine 0.12% Liquid 15 milliLiter(s) Oral Mucosa every 12 hours  dextrose 5%. 1000 milliLiter(s) (50 mL/Hr) IV Continuous <Continuous>  dextrose 50% Injectable 12.5 Gram(s) IV Push once  dextrose 50% Injectable 25 Gram(s) IV Push once  dextrose 50% Injectable 25 Gram(s) IV Push once  fluconAZOLE IVPB 400 milliGRAM(s) IV Intermittent every 24 hours  heparin   Injectable 5000 Unit(s) SubCutaneous every 8 hours  insulin lispro (HumaLOG) corrective regimen sliding scale   SubCutaneous Before meals and at bedtime  lactated ringers. 1000 milliLiter(s) (110 mL/Hr) IV Continuous <Continuous>  levothyroxine Injectable 110 MICROGram(s) IV Push at bedtime  pantoprazole  Injectable 40 milliGRAM(s) IV Push daily  piperacillin/tazobactam IVPB.. 3.375 Gram(s) IV Intermittent every 6 hours    MEDICATIONS  (PRN):  dextrose 40% Gel 15 Gram(s) Oral once PRN Blood Glucose LESS THAN 70 milliGRAM(s)/deciliter  glucagon  Injectable 1 milliGRAM(s) IntraMuscular once PRN Glucose LESS THAN 70 milligrams/deciliter  HYDROmorphone  Injectable 1 milliGRAM(s) IV Push every 6 hours PRN Severe Pain (7 - 10)  HYDROmorphone  Injectable 0.5 milliGRAM(s) IV Push every 6 hours PRN Moderate Pain (4 - 6)  ondansetron Injectable 4 milliGRAM(s) IV Push every 6 hours PRN Nausea      I&O's Summary    05 Oct 2020 07:01  -  06 Oct 2020 07:00  --------------------------------------------------------  IN: 2233.6 mL / OUT: 1245 mL / NET: 988.6 mL    06 Oct 2020 07:01  -  06 Oct 2020 07:27  --------------------------------------------------------  IN: 110 mL / OUT: 0 mL / NET: 110 mL        Vital Signs Last 24 Hrs  T(C): 37.1 (06 Oct 2020 07:00), Max: 37.1 (06 Oct 2020 07:00)  T(F): 98.7 (06 Oct 2020 07:00), Max: 98.7 (06 Oct 2020 07:00)  HR: 97 (06 Oct 2020 07:00) (59 - 97)  BP: 114/68 (06 Oct 2020 07:00) (96/49 - 160/71)  BP(mean): 82 (06 Oct 2020 07:00) (71 - 102)  RR: 20 (06 Oct 2020 07:00) (12 - 22)  SpO2: 100% (06 Oct 2020 07:00) (97% - 100%)    Gen: NAD, resting comfortably in bed  HEENT: MMM  Pulm: Good inspiratory effort, nonlabored breathing, clear lungs  C/V: NSR S1 S2 no murmur  Abd: Soft, appropriately tender, nondistended. No rebound, no guarding. Incisional VAC not bringing anything  Extrem: WWP, no edema  Vasc: 2+ pulses  Neuro: moves all extremities  Psych: affect appropriate                          10.3   11.94 )-----------( 299      ( 06 Oct 2020 05:40 )             30.7   10-06    136  |  104  |  12  ----------------------------<  240<H>  4.5   |  19<L>  |  0.59    Ca    7.0<L>      06 Oct 2020 05:40  Phos  3.1     10-06  Mg     1.9     10-06    TPro  4.5<L>  /  Alb  2.5<L>  /  TBili  0.2  /  DBili  x   /  AST  33  /  ALT  25  /  AlkPhos  48  10-06

## 2020-10-06 NOTE — PHYSICAL THERAPY INITIAL EVALUATION ADULT - PLANNED THERAPY INTERVENTIONS, PT EVAL
ROM/transfer training/balance training/bed mobility training/gait training/postural re-education/strengthening

## 2020-10-06 NOTE — PHYSICAL THERAPY INITIAL EVALUATION ADULT - IMPAIRMENTS FOUND, PT EVAL
muscle strength/ergonomics and body mechanics/gait, locomotion, and balance/ROM/aerobic capacity/endurance/joint integrity and mobility/posture

## 2020-10-06 NOTE — PROGRESS NOTE ADULT - ASSESSMENT
A/P: 75F PMHx RA, DM, hypothyroidism, cataracts, ?neuromuscular disease, PSHx lap willem, lap appy, b/l oophorectomy for ovarian cysts a/w SBO, questionable bowel ischemia, now s/p exploratory laparotomy, SBR and primary anastomosis (10/05/2020) in stable condition.    NEURO: propofol, fentanyl (DC).  Dilaudid  CV: stable, goal of MAP >65  PULM: AC, wean to extubate, CXR is WNL  GI/FEN: NPO  : alfredo  ENDO: ISS, synthroid  ID: zosyn (10/5-), fluconazole (10/5-), f/u ID consult  PPX: SCDs, SQH. protonix  LINES: PIVs x2  WOUNDS/DRAINS: Prevena vac (incisional)   75F PMHx RA, DM, hypothyroidism, cataracts, ?neuromuscular disease, PSHx lap willem, lap appy, b/l oophorectomy for ovarian cysts a/w SBO, questionable bowel ischemia, now s/p exploratory laparotomy, SBR and primary anastomosis (10/05/2020) in stable condition.    NEURO: propofol, fentanyl (DC'd).  Dilaudid  CV: stable, goal of MAP >65  PULM: Extubated 10/06/2020, CXR is WNL, IS  GI/FEN: NPO  : alfredo  ENDO: ISS (FU on insulin requiremtnts), synthroid  ID: zosyn (10/5-), fluconazole (10/5-), ID Dr. Ayala consulted and will see the patient, will keep on same ABX and do blood culture if the patient spikes fever  PPX: SCDs, SQH. protonix  LINES: PIVs x2, Left Radial line (10/05)  WOUNDS/DRAINS: Prevena vac (incisional)  PT/OT: ordered 75F PMHx RA, DM, hypothyroidism, cataracts, ?neuromuscular disease, PSHx lap willem, lap appy, b/l oophorectomy for ovarian cysts a/w SBO, questionable bowel ischemia, now s/p exploratory laparotomy, SBR and primary anastomosis (10/05/2020) in stable condition.    NEURO: propofol, fentanyl (DC'd).  Dilaudid  CV: stable, goal of MAP >65  PULM: Extubated 10/06/2020, CXR is WNL, IS  GI/FEN: NPO  : alfredo, adequate UOP, Possibly remove Alfredo tomorrow 10/07/2020 after checking with Dr. Hines)  ENDO: ISS (FU on insulin requiremtnts), synthroid  ID: zosyn (10/5-), fluconazole (10/5-), ID Dr. Ayala consulted and will see the patient, will keep on same ABX and do blood culture if the patient spikes fever  PPX: SCDs, SQH. protonix  LINES: PIVs x2, Left Radial line (10/05)  WOUNDS/DRAINS: Prevena vac (incisional)  PT/OT: ordered 75F PMHx RA, DM, hypothyroidism, cataracts, ?neuromuscular disease, PSHx lap willem, lap appy, b/l oophorectomy for ovarian cysts a/w SBO, questionable bowel ischemia, now s/p exploratory laparotomy, SBR and primary anastomosis (10/05/2020) in stable condition.    NEURO: propofol, fentanyl (DC'd).  Dilaudid  CV: stable, goal of MAP >65 on RL at 110/hr  PULM: Extubated 10/06/2020, CXR is WNL, IS  GI/FEN: NPO  : alfredo, adequate UOP, Possibly remove Alfredo tomorrow 10/07/2020 after checking with Dr. Hines)  ENDO: ISS (FU on insulin requiremtnts), synthroid  ID: zosyn (10/5-), fluconazole (10/5-), ID Dr. Ayala consulted and will see the patient, will keep on same ABX and do blood culture if the patient spikes fever  PPX: SCDs, SQH. protonix  LINES: PIVs x2, Left Radial line (10/05)  WOUNDS/DRAINS: Prevena vac (incisional)  PT/OT: ordered

## 2020-10-07 DIAGNOSIS — I45.10 UNSPECIFIED RIGHT BUNDLE-BRANCH BLOCK: ICD-10-CM

## 2020-10-07 DIAGNOSIS — E11.9 TYPE 2 DIABETES MELLITUS WITHOUT COMPLICATIONS: ICD-10-CM

## 2020-10-07 DIAGNOSIS — I44.7 LEFT BUNDLE-BRANCH BLOCK, UNSPECIFIED: ICD-10-CM

## 2020-10-07 LAB
ANION GAP SERPL CALC-SCNC: 9 MMOL/L — SIGNIFICANT CHANGE UP (ref 5–17)
BLD GP AB SCN SERPL QL: NEGATIVE — SIGNIFICANT CHANGE UP
BUN SERPL-MCNC: 9 MG/DL — SIGNIFICANT CHANGE UP (ref 7–23)
CALCIUM SERPL-MCNC: 7.3 MG/DL — LOW (ref 8.4–10.5)
CHLORIDE SERPL-SCNC: 105 MMOL/L — SIGNIFICANT CHANGE UP (ref 96–108)
CO2 SERPL-SCNC: 21 MMOL/L — LOW (ref 22–31)
CREAT SERPL-MCNC: 0.66 MG/DL — SIGNIFICANT CHANGE UP (ref 0.5–1.3)
GLUCOSE SERPL-MCNC: 160 MG/DL — HIGH (ref 70–99)
HCT VFR BLD CALC: 23 % — LOW (ref 34.5–45)
HCT VFR BLD CALC: 24.4 % — LOW (ref 34.5–45)
HCT VFR BLD CALC: 26 % — LOW (ref 34.5–45)
HGB BLD-MCNC: 7.5 G/DL — LOW (ref 11.5–15.5)
HGB BLD-MCNC: 8.1 G/DL — LOW (ref 11.5–15.5)
HGB BLD-MCNC: 8.5 G/DL — LOW (ref 11.5–15.5)
MAGNESIUM SERPL-MCNC: 2 MG/DL — SIGNIFICANT CHANGE UP (ref 1.6–2.6)
MCHC RBC-ENTMCNC: 29.3 PG — SIGNIFICANT CHANGE UP (ref 27–34)
MCHC RBC-ENTMCNC: 29.5 PG — SIGNIFICANT CHANGE UP (ref 27–34)
MCHC RBC-ENTMCNC: 29.6 PG — SIGNIFICANT CHANGE UP (ref 27–34)
MCHC RBC-ENTMCNC: 32.6 GM/DL — SIGNIFICANT CHANGE UP (ref 32–36)
MCHC RBC-ENTMCNC: 32.7 GM/DL — SIGNIFICANT CHANGE UP (ref 32–36)
MCHC RBC-ENTMCNC: 33.2 GM/DL — SIGNIFICANT CHANGE UP (ref 32–36)
MCV RBC AUTO: 89.1 FL — SIGNIFICANT CHANGE UP (ref 80–100)
MCV RBC AUTO: 89.8 FL — SIGNIFICANT CHANGE UP (ref 80–100)
MCV RBC AUTO: 90.3 FL — SIGNIFICANT CHANGE UP (ref 80–100)
NRBC # BLD: 0 /100 WBCS — SIGNIFICANT CHANGE UP (ref 0–0)
PHOSPHATE SERPL-MCNC: 2.1 MG/DL — LOW (ref 2.5–4.5)
PLATELET # BLD AUTO: 202 K/UL — SIGNIFICANT CHANGE UP (ref 150–400)
PLATELET # BLD AUTO: 219 K/UL — SIGNIFICANT CHANGE UP (ref 150–400)
PLATELET # BLD AUTO: 250 K/UL — SIGNIFICANT CHANGE UP (ref 150–400)
POTASSIUM SERPL-MCNC: 3.5 MMOL/L — SIGNIFICANT CHANGE UP (ref 3.5–5.3)
POTASSIUM SERPL-SCNC: 3.5 MMOL/L — SIGNIFICANT CHANGE UP (ref 3.5–5.3)
RBC # BLD: 2.56 M/UL — LOW (ref 3.8–5.2)
RBC # BLD: 2.74 M/UL — LOW (ref 3.8–5.2)
RBC # BLD: 2.88 M/UL — LOW (ref 3.8–5.2)
RBC # FLD: 14.3 % — SIGNIFICANT CHANGE UP (ref 10.3–14.5)
RBC # FLD: 14.4 % — SIGNIFICANT CHANGE UP (ref 10.3–14.5)
RBC # FLD: 14.4 % — SIGNIFICANT CHANGE UP (ref 10.3–14.5)
RH IG SCN BLD-IMP: POSITIVE — SIGNIFICANT CHANGE UP
SODIUM SERPL-SCNC: 135 MMOL/L — SIGNIFICANT CHANGE UP (ref 135–145)
WBC # BLD: 11.74 K/UL — HIGH (ref 3.8–10.5)
WBC # BLD: 12.96 K/UL — HIGH (ref 3.8–10.5)
WBC # BLD: 14.26 K/UL — HIGH (ref 3.8–10.5)
WBC # FLD AUTO: 11.74 K/UL — HIGH (ref 3.8–10.5)
WBC # FLD AUTO: 12.96 K/UL — HIGH (ref 3.8–10.5)
WBC # FLD AUTO: 14.26 K/UL — HIGH (ref 3.8–10.5)

## 2020-10-07 PROCEDURE — 99232 SBSQ HOSP IP/OBS MODERATE 35: CPT

## 2020-10-07 PROCEDURE — 93970 EXTREMITY STUDY: CPT | Mod: 26

## 2020-10-07 PROCEDURE — 99233 SBSQ HOSP IP/OBS HIGH 50: CPT | Mod: GC

## 2020-10-07 RX ORDER — CHLORHEXIDINE GLUCONATE 213 G/1000ML
1 SOLUTION TOPICAL DAILY
Refills: 0 | Status: DISCONTINUED | OUTPATIENT
Start: 2020-10-07 | End: 2020-10-09

## 2020-10-07 RX ORDER — ACETAMINOPHEN 500 MG
1000 TABLET ORAL ONCE
Refills: 0 | Status: COMPLETED | OUTPATIENT
Start: 2020-10-07 | End: 2020-10-07

## 2020-10-07 RX ORDER — SODIUM CHLORIDE 9 MG/ML
1000 INJECTION, SOLUTION INTRAVENOUS
Refills: 0 | Status: DISCONTINUED | OUTPATIENT
Start: 2020-10-07 | End: 2020-10-07

## 2020-10-07 RX ORDER — POTASSIUM CHLORIDE 20 MEQ
10 PACKET (EA) ORAL ONCE
Refills: 0 | Status: COMPLETED | OUTPATIENT
Start: 2020-10-07 | End: 2020-10-07

## 2020-10-07 RX ORDER — POTASSIUM PHOSPHATE, MONOBASIC POTASSIUM PHOSPHATE, DIBASIC 236; 224 MG/ML; MG/ML
30 INJECTION, SOLUTION INTRAVENOUS ONCE
Refills: 0 | Status: COMPLETED | OUTPATIENT
Start: 2020-10-07 | End: 2020-10-07

## 2020-10-07 RX ORDER — SODIUM CHLORIDE 9 MG/ML
500 INJECTION, SOLUTION INTRAVENOUS ONCE
Refills: 0 | Status: COMPLETED | OUTPATIENT
Start: 2020-10-07 | End: 2020-10-07

## 2020-10-07 RX ORDER — SODIUM CHLORIDE 9 MG/ML
1000 INJECTION, SOLUTION INTRAVENOUS
Refills: 0 | Status: DISCONTINUED | OUTPATIENT
Start: 2020-10-07 | End: 2020-10-08

## 2020-10-07 RX ADMIN — FLUCONAZOLE 100 MILLIGRAM(S): 150 TABLET ORAL at 19:59

## 2020-10-07 RX ADMIN — HYDROMORPHONE HYDROCHLORIDE 0.5 MILLIGRAM(S): 2 INJECTION INTRAMUSCULAR; INTRAVENOUS; SUBCUTANEOUS at 09:52

## 2020-10-07 RX ADMIN — Medication 100 MILLIEQUIVALENT(S): at 06:44

## 2020-10-07 RX ADMIN — CHLORHEXIDINE GLUCONATE 1 APPLICATION(S): 213 SOLUTION TOPICAL at 07:49

## 2020-10-07 RX ADMIN — HYDROMORPHONE HYDROCHLORIDE 0.5 MILLIGRAM(S): 2 INJECTION INTRAMUSCULAR; INTRAVENOUS; SUBCUTANEOUS at 16:10

## 2020-10-07 RX ADMIN — SODIUM CHLORIDE 110 MILLILITER(S): 9 INJECTION, SOLUTION INTRAVENOUS at 02:49

## 2020-10-07 RX ADMIN — PIPERACILLIN AND TAZOBACTAM 200 GRAM(S): 4; .5 INJECTION, POWDER, LYOPHILIZED, FOR SOLUTION INTRAVENOUS at 03:05

## 2020-10-07 RX ADMIN — HEPARIN SODIUM 5000 UNIT(S): 5000 INJECTION INTRAVENOUS; SUBCUTANEOUS at 18:00

## 2020-10-07 RX ADMIN — Medication 110 MICROGRAM(S): at 21:58

## 2020-10-07 RX ADMIN — HYDROMORPHONE HYDROCHLORIDE 0.5 MILLIGRAM(S): 2 INJECTION INTRAMUSCULAR; INTRAVENOUS; SUBCUTANEOUS at 02:03

## 2020-10-07 RX ADMIN — PIPERACILLIN AND TAZOBACTAM 200 GRAM(S): 4; .5 INJECTION, POWDER, LYOPHILIZED, FOR SOLUTION INTRAVENOUS at 22:33

## 2020-10-07 RX ADMIN — HYDROMORPHONE HYDROCHLORIDE 0.5 MILLIGRAM(S): 2 INJECTION INTRAMUSCULAR; INTRAVENOUS; SUBCUTANEOUS at 03:16

## 2020-10-07 RX ADMIN — Medication 400 MILLIGRAM(S): at 03:16

## 2020-10-07 RX ADMIN — Medication 1000 MILLIGRAM(S): at 04:01

## 2020-10-07 RX ADMIN — POTASSIUM PHOSPHATE, MONOBASIC POTASSIUM PHOSPHATE, DIBASIC 83.33 MILLIMOLE(S): 236; 224 INJECTION, SOLUTION INTRAVENOUS at 07:51

## 2020-10-07 RX ADMIN — PANTOPRAZOLE SODIUM 40 MILLIGRAM(S): 20 TABLET, DELAYED RELEASE ORAL at 11:48

## 2020-10-07 RX ADMIN — HEPARIN SODIUM 5000 UNIT(S): 5000 INJECTION INTRAVENOUS; SUBCUTANEOUS at 08:14

## 2020-10-07 RX ADMIN — SODIUM CHLORIDE 1000 MILLILITER(S): 9 INJECTION, SOLUTION INTRAVENOUS at 19:58

## 2020-10-07 RX ADMIN — PIPERACILLIN AND TAZOBACTAM 200 GRAM(S): 4; .5 INJECTION, POWDER, LYOPHILIZED, FOR SOLUTION INTRAVENOUS at 14:56

## 2020-10-07 RX ADMIN — PIPERACILLIN AND TAZOBACTAM 200 GRAM(S): 4; .5 INJECTION, POWDER, LYOPHILIZED, FOR SOLUTION INTRAVENOUS at 08:50

## 2020-10-07 NOTE — OCCUPATIONAL THERAPY INITIAL EVALUATION ADULT - GENERAL OBSERVATIONS, REHAB EVAL
Pt's RN Stephanie aware of intent to eval; cleared Pt. Pt received in supine - +telemetry, abdominal wound vac, heplock. Pt agreeable to OT.

## 2020-10-07 NOTE — PROGRESS NOTE ADULT - SUBJECTIVE AND OBJECTIVE BOX
SUBJECTIVE: Patient seen and examined by chief resident on morning rounds. No acute events overnight.        Vital Signs Last 24 Hrs  T(C): 36.5 (07 Oct 2020 09:25), Max: 37.7 (07 Oct 2020 00:53)  T(F): 97.7 (07 Oct 2020 09:25), Max: 99.8 (07 Oct 2020 00:53)  HR: 89 (07 Oct 2020 11:00) (82 - 106)  BP: 117/55 (07 Oct 2020 11:00) (92/48 - 141/63)  BP(mean): 79 (07 Oct 2020 11:00) (63 - 90)  RR: 18 (07 Oct 2020 11:00) (13 - 26)  SpO2: 97% (07 Oct 2020 11:00) (94% - 99%)    Physical Exam:  General: NAD  Pulmonary: Nonlabored breathing, no respiratory distress  Abdominal: soft, appropriately tender to palpation near incision, prevena intact with good seal  Extremities: WWP, normal strength, no clubbing/cyanosis/edema  Neuro: A/O x3    Lines/drains/tubes:    I&O's Summary    06 Oct 2020 07:01  -  07 Oct 2020 07:00  --------------------------------------------------------  IN: 3463.3 mL / OUT: 2373 mL / NET: 1090.3 mL    07 Oct 2020 07:01  -  07 Oct 2020 12:18  --------------------------------------------------------  IN: 529.9 mL / OUT: 800 mL / NET: -270.1 mL        LABS:                        8.1    12.96 )-----------( 219      ( 07 Oct 2020 07:11 )             24.4     10-07    135  |  105  |  9   ----------------------------<  160<H>  3.5   |  21<L>  |  0.66    Ca    7.3<L>      07 Oct 2020 05:03  Phos  2.1     10-07  Mg     2.0     10-07    TPro  4.5<L>  /  Alb  2.5<L>  /  TBili  0.2  /  DBili  x   /  AST  33  /  ALT  25  /  AlkPhos  48  10-06    PT/INR - ( 05 Oct 2020 21:31 )   PT: 13.7 sec;   INR: 1.15          PTT - ( 05 Oct 2020 21:31 )  PTT:28.9 sec    CAPILLARY BLOOD GLUCOSE      POCT Blood Glucose.: 129 mg/dL (07 Oct 2020 11:44)  POCT Blood Glucose.: 147 mg/dL (07 Oct 2020 05:26)  POCT Blood Glucose.: 156 mg/dL (06 Oct 2020 21:03)  POCT Blood Glucose.: 174 mg/dL (06 Oct 2020 17:02)    LIVER FUNCTIONS - ( 06 Oct 2020 02:05 )  Alb: 2.5 g/dL / Pro: 4.5 g/dL / ALK PHOS: 48 U/L / ALT: 25 U/L / AST: 33 U/L / GGT: x             RADIOLOGY & ADDITIONAL STUDIES:

## 2020-10-07 NOTE — OCCUPATIONAL THERAPY INITIAL EVALUATION ADULT - PHYSICAL ASSIST/NONPHYSICAL ASSIST:DRESS UPPER BODY, OT EVAL
Received records from [facility] (office visits and operative report from 10 years ago all relate to left rotator cuff repair surgery, nothing about a sinus surgery), sent to clinic.   1 person assist/nonverbal cues (demo/gestures)/verbal cues

## 2020-10-07 NOTE — PROGRESS NOTE ADULT - SUBJECTIVE AND OBJECTIVE BOX
INTERVAL HPI/OVERNIGHT EVENTS: Afebrile, no gas, no BM    CONSTITUTIONAL:  Negative fever or chills, feels better  EYES:  Negative  blurry vision or double vision  CARDIOVASCULAR:  Negative for chest pain or palpitations  RESPIRATORY:  Negative for cough, wheezing, or SOB   GASTROINTESTINAL:  Negative for nausea, vomiting, diarrhea, constipation, or abdominal pain  GENITOURINARY:  Negative frequency, urgency or dysuria  NEUROLOGIC:  No headache, confusion, dizziness, lightheadedness      ANTIBIOTICS/RELEVANT:    MEDICATIONS  (STANDING):  chlorhexidine 2% Cloths 1 Application(s) Topical daily  dextrose 5%. 1000 milliLiter(s) (50 mL/Hr) IV Continuous <Continuous>  dextrose 50% Injectable 12.5 Gram(s) IV Push once  dextrose 50% Injectable 25 Gram(s) IV Push once  dextrose 50% Injectable 25 Gram(s) IV Push once  fluconAZOLE IVPB 400 milliGRAM(s) IV Intermittent every 24 hours  heparin   Injectable 5000 Unit(s) SubCutaneous every 8 hours  insulin lispro (HumaLOG) corrective regimen sliding scale   SubCutaneous Before meals and at bedtime  lactated ringers. 1000 milliLiter(s) (60 mL/Hr) IV Continuous <Continuous>  levothyroxine Injectable 110 MICROGram(s) IV Push at bedtime  pantoprazole  Injectable 40 milliGRAM(s) IV Push daily  piperacillin/tazobactam IVPB.. 3.375 Gram(s) IV Intermittent every 6 hours    MEDICATIONS  (PRN):  dextrose 40% Gel 15 Gram(s) Oral once PRN Blood Glucose LESS THAN 70 milliGRAM(s)/deciliter  glucagon  Injectable 1 milliGRAM(s) IntraMuscular once PRN Glucose LESS THAN 70 milligrams/deciliter  HYDROmorphone  Injectable 1 milliGRAM(s) IV Push every 6 hours PRN Severe Pain (7 - 10)  HYDROmorphone  Injectable 0.5 milliGRAM(s) IV Push every 6 hours PRN Moderate Pain (4 - 6)  ondansetron Injectable 4 milliGRAM(s) IV Push every 6 hours PRN Nausea        Vital Signs Last 24 Hrs  T(C): 37.6 (07 Oct 2020 19:57), Max: 37.7 (07 Oct 2020 00:53)  T(F): 99.6 (07 Oct 2020 19:57), Max: 99.8 (07 Oct 2020 00:53)  HR: 107 (07 Oct 2020 19:57) (82 - 122)  BP: 138/62 (07 Oct 2020 19:01) (95/44 - 141/63)  BP(mean): 79 (07 Oct 2020 11:00) (63 - 90)  RR: 20 (07 Oct 2020 19:01) (13 - 26)  SpO2: 93% (07 Oct 2020 19:01) (93% - 98%)    10-06-20 @ 07:01  -  10-07-20 @ 07:00  --------------------------------------------------------  IN: 3463.3 mL / OUT: 2373 mL / NET: 1090.3 mL    10-07-20 @ 07:01  -  10-07-20 @ 21:38  --------------------------------------------------------  IN: 529.9 mL / OUT: 1700 mL / NET: -1170.1 mL      PHYSICAL EXAM:    Constitutional: Well-developed, well nourished  Eyes:TYSON, EOMI  Ear/Nose/Throat: no oral lesion, no sinus tenderness on percussion	  Neck:no JVD, no lymphadenopathy, supple  Respiratory: CTA sridhar  Cardiovascular: S1S2 RRR, no murmurs  Gastrointestinal: postop tenderness, (-) BS, no HSM  Extremities: no edema, (+) DJD  Vascular: DP Pulse: right normal; left normal  Skin: no rash,     LABS:                        8.5    14.26 )-----------( 250      ( 07 Oct 2020 15:13 )             26.0     10-07    135  |  105  |  9   ----------------------------<  160<H>  3.5   |  21<L>  |  0.66    Ca    7.3<L>      07 Oct 2020 05:03  Phos  2.1     10-07  Mg     2.0     10-07    TPro  4.5<L>  /  Alb  2.5<L>  /  TBili  0.2  /  DBili  x   /  AST  33  /  ALT  25  /  AlkPhos  48  10-06        MICROBIOLOGY:  Culture - Body Fluid with Gram Stain (10.06.20 @ 01:41)    Gram Stain:   Rare White blood cells  Rare Gram Positive Cocci    Specimen Source: .Body Fluid Intraabdominal Fluid #1    Culture Results:   No growth to date    RADIOLOGY & ADDITIONAL STUDIES:

## 2020-10-07 NOTE — OCCUPATIONAL THERAPY INITIAL EVALUATION ADULT - IMPAIRMENTS CONTRIBUTING IMPAIRED BED MOBILITY, REHAB EVAL
decreased strength/impaired postural control/decreased flexibility/impaired balance/impaired motor control/impaired coordination

## 2020-10-07 NOTE — PROGRESS NOTE ADULT - SUBJECTIVE AND OBJECTIVE BOX
INTERVAL HPI/OVERNIGHT EVENTS:   SURGERY ATTENDING    STATUS POST:      Exploratory Laparotomy, Lysis of adhesions, Reduction of incarcerated strangulated internal hernia, Small bowel resection 120 cm of ileum with side to side functional end to end anastomosis, Abdominal washout, Drainage, Closure.      POST OPERATIVE DAY #: 2    SUBJECTIVE:  Flatus: [ ] YES [x ] NO             Bowel Movement: [ ] YES [x ] NO  Pain (0-10):         3   Pain Control Adequate: [x ] YES [ ] NO  Nausea: [ ] YES [x ] NO            Vomiting: [ ] YES [x ] NO  Diarrhea: [ ] YES [x ] NO         Constipation: [ ] YES [x ] NO     Chest Pain: [ ] YES [x ] NO    SOB:  [ ] YES [x ] NO    MEDICATIONS  (STANDING):  chlorhexidine 2% Cloths 1 Application(s) Topical daily  dextrose 5%. 1000 milliLiter(s) (50 mL/Hr) IV Continuous <Continuous>  dextrose 50% Injectable 12.5 Gram(s) IV Push once  dextrose 50% Injectable 25 Gram(s) IV Push once  dextrose 50% Injectable 25 Gram(s) IV Push once  fluconAZOLE IVPB 400 milliGRAM(s) IV Intermittent every 24 hours  heparin   Injectable 5000 Unit(s) SubCutaneous every 8 hours  insulin lispro (HumaLOG) corrective regimen sliding scale   SubCutaneous Before meals and at bedtime  lactated ringers. 1000 milliLiter(s) (60 mL/Hr) IV Continuous <Continuous>  levothyroxine Injectable 110 MICROGram(s) IV Push at bedtime  pantoprazole  Injectable 40 milliGRAM(s) IV Push daily  piperacillin/tazobactam IVPB.. 3.375 Gram(s) IV Intermittent every 6 hours    MEDICATIONS  (PRN):  dextrose 40% Gel 15 Gram(s) Oral once PRN Blood Glucose LESS THAN 70 milliGRAM(s)/deciliter  glucagon  Injectable 1 milliGRAM(s) IntraMuscular once PRN Glucose LESS THAN 70 milligrams/deciliter  HYDROmorphone  Injectable 1 milliGRAM(s) IV Push every 6 hours PRN Severe Pain (7 - 10)  HYDROmorphone  Injectable 0.5 milliGRAM(s) IV Push every 6 hours PRN Moderate Pain (4 - 6)  ondansetron Injectable 4 milliGRAM(s) IV Push every 6 hours PRN Nausea      Vital Signs Last 24 Hrs  T(C): 37.6 (07 Oct 2020 19:57), Max: 37.7 (07 Oct 2020 00:53)  T(F): 99.6 (07 Oct 2020 19:57), Max: 99.8 (07 Oct 2020 00:53)  HR: 102 (07 Oct 2020 21:00) (82 - 122)  BP: 140/65 (07 Oct 2020 21:00) (95/44 - 141/63)  BP(mean): 79 (07 Oct 2020 11:00) (63 - 90)  RR: 18 (07 Oct 2020 21:00) (13 - 26)  SpO2: 100% (07 Oct 2020 21:00) (93% - 100%)    PHYSICAL EXAM:      Constitutional:    Eyes:    ENMT:    Neck:    Breasts:    Back:    Respiratory:    Cardiovascular:    Gastrointestinal:    Genitourinary:    Rectal:    Extremities:    Vascular:    Neurological:    Skin:    Lymph Nodes:    Musculoskeletal:    Psychiatric:        I&O's Detail    06 Oct 2020 07:01  -  07 Oct 2020 07:00  --------------------------------------------------------  IN:    IV PiggyBack: 800 mL    IV PiggyBack: 183.3 mL    Lactated Ringers: 2420 mL    Lactated Ringers: 60 mL  Total IN: 3463.3 mL    OUT:    Indwelling Catheter - Urethral (mL): 2373 mL    VAC (Vacuum Assisted Closure) System (mL): 0 mL  Total OUT: 2373 mL    Total NET: 1090.3 mL      07 Oct 2020 07:01  -  07 Oct 2020 22:08  --------------------------------------------------------  IN:    IV PiggyBack: 100 mL    IV PiggyBack: 249.9 mL    Lactated Ringers: 180 mL  Total IN: 529.9 mL    OUT:    Indwelling Catheter - Urethral (mL): 800 mL    VAC (Vacuum Assisted Closure) System (mL): 0 mL    Voided (mL): 900 mL  Total OUT: 1700 mL    Total NET: -1170.1 mL          LABS:                        8.5    14.26 )-----------( 250      ( 07 Oct 2020 15:13 )             26.0     10-07    135  |  105  |  9   ----------------------------<  160<H>  3.5   |  21<L>  |  0.66    Ca    7.3<L>      07 Oct 2020 05:03  Phos  2.1     10-07  Mg     2.0     10-07    TPro  4.5<L>  /  Alb  2.5<L>  /  TBili  0.2  /  DBili  x   /  AST  33  /  ALT  25  /  AlkPhos  48  10-06          RADIOLOGY & ADDITIONAL STUDIES:

## 2020-10-07 NOTE — PROGRESS NOTE ADULT - SUBJECTIVE AND OBJECTIVE BOX
Interval Events:  No adverse events overnight.   Patient seen and examined at bedside.      Allergies    gabapentin (Rash)  NSAIDs (Unknown)  penicillin (Unknown)  tramadol (Hives)    Intolerances        Vital Signs Last 24 Hrs  T(C): 36.7 (07 Oct 2020 05:00), Max: 37.7 (07 Oct 2020 00:53)  T(F): 98 (07 Oct 2020 05:00), Max: 99.8 (07 Oct 2020 00:53)  HR: 93 (07 Oct 2020 06:00) (85 - 107)  BP: 102/69 (07 Oct 2020 06:00) (92/48 - 133/51)  BP(mean): 82 (07 Oct 2020 06:00) (63 - 84)  RR: 26 (07 Oct 2020 06:00) (13 - 26)  SpO2: 96% (07 Oct 2020 06:00) (94% - 100%)    10-05 @ 07:01  -  10-06 @ 07:00  --------------------------------------------------------  IN: 2233.6 mL / OUT: 1245 mL / NET: 988.6 mL    10-06 @ 07:01  -  10-07 @ 06:41  --------------------------------------------------------  IN: 3330 mL / OUT: 1973 mL / NET: 1357 mL      10-05 @ 07:01  -  10-06 @ 07:00  --------------------------------------------------------  IN: 2233.6 mL / OUT: 1245 mL / NET: 988.6 mL    10-06 @ 07:01  -  10-07 @ 06:41  --------------------------------------------------------  IN: 3330 mL / OUT: 1973 mL / NET: 1357 mL        Physical Exam:     Gen: NAD well nourished  Neuro: A&OX3 No deficits  CV:RRR Reg s1s2 no M  Pulm: CTA b/l No w/r/r  Abd: Soft + appropriate tenderness at incision site. + Midline prevena vac noted with minimal drainage. + BS  Ext: No C/C/E   Vasc: + DP b/l   Skin: no rashes noted  MSK: No joint swelling  Psych: No signs of anxiety or depression      LABS:  ABG - ( 05 Oct 2020 22:50 )  pH, Arterial: 7.37  pH, Blood: x     /  pCO2: 32    /  pO2: 253   / HCO3: 18    / Base Excess: -5.8  /  SaO2: 98                  CBC Full  -  ( 07 Oct 2020 05:03 )  WBC Count : 11.74 K/uL  RBC Count : 2.56 M/uL  Hemoglobin : 7.5 g/dL  Hematocrit : 23.0 %  Platelet Count - Automated : 202 K/uL  Mean Cell Volume : 89.8 fl  Mean Cell Hemoglobin : 29.3 pg  Mean Cell Hemoglobin Concentration : 32.6 gm/dL  Auto Neutrophil # : x  Auto Lymphocyte # : x  Auto Monocyte # : x  Auto Eosinophil # : x  Auto Basophil # : x  Auto Neutrophil % : x  Auto Lymphocyte % : x  Auto Monocyte % : x  Auto Eosinophil % : x  Auto Basophil % : x    10-07    135  |  105  |  9   ----------------------------<  160<H>  3.5   |  21<L>  |  0.66    Ca    7.3<L>      07 Oct 2020 05:03  Phos  2.1     10-07  Mg     2.0     10-07    TPro  4.5<L>  /  Alb  2.5<L>  /  TBili  0.2  /  DBili  x   /  AST  33  /  ALT  25  /  AlkPhos  48  10-06    PT/INR - ( 05 Oct 2020 21:31 )   PT: 13.7 sec;   INR: 1.15          PTT - ( 05 Oct 2020 21:31 )  PTT:28.9 sec      Urinalysis Basic - ( 05 Oct 2020 11:43 )    Color: Yellow / Appearance: Clear / SG: >=1.030 / pH: x  Gluc: x / Ketone: >=80 mg/dL  / Bili: Negative / Urobili: 0.2 E.U./dL   Blood: x / Protein: 100 mg/dL / Nitrite: NEGATIVE   Leuk Esterase: NEGATIVE / RBC: < 5 /HPF / WBC < 5 /HPF   Sq Epi: x / Non Sq Epi: 0-5 /HPF / Bacteria: Present /HPF              RADIOLOGY & ADDITIONAL STUDIES (The following images were personally reviewed):          A/p:  75F PMHx RA, DM, hypothyroidism, cataracts, ?neuromuscular disease, PSHx lap willem, lap appy, b/l oophorectomy for ovarian cysts a/w SBO, questionable bowel ischemia, now s/p exploratory laparotomy, SBR and primary anastomosis (10/05/2020) in stable condition.    NEURO: Dilaudid PRN Zofran   CV: stable, goal of MAP >65 LR @110- decrease to 60 Hgb 7.5 no sign of bleed will repeat this am.   PULM: Extubated 10/06 Satting well on  RA, IS  GI/FEN: NPO  : juni, Jeanie dc today will discuss with surgical team   ENDO: ISS FS improved overnight. synthroid  ID: zosyn (10/5-), fluconazole (10/5-), ID Dr. Ayala consulted  PPX: SCDs, SQH. protonix  LINES: PIVs x2, Left Radial line (10/05)  WOUNDS/DRAINS: Prevena vac (incisional)  PT/OT: ordered 10/6 oob to chair, OT ordered rec STANLEY   Interval Events:  No adverse events overnight.   Patient seen and examined at bedside.      Allergies    gabapentin (Rash)  NSAIDs (Unknown)  penicillin (Unknown)  tramadol (Hives)    Intolerances        Vital Signs Last 24 Hrs  T(C): 36.7 (07 Oct 2020 05:00), Max: 37.7 (07 Oct 2020 00:53)  T(F): 98 (07 Oct 2020 05:00), Max: 99.8 (07 Oct 2020 00:53)  HR: 93 (07 Oct 2020 06:00) (85 - 107)  BP: 102/69 (07 Oct 2020 06:00) (92/48 - 133/51)  BP(mean): 82 (07 Oct 2020 06:00) (63 - 84)  RR: 26 (07 Oct 2020 06:00) (13 - 26)  SpO2: 96% (07 Oct 2020 06:00) (94% - 100%)    10-05 @ 07:01  -  10-06 @ 07:00  --------------------------------------------------------  IN: 2233.6 mL / OUT: 1245 mL / NET: 988.6 mL    10-06 @ 07:01  -  10-07 @ 06:41  --------------------------------------------------------  IN: 3330 mL / OUT: 1973 mL / NET: 1357 mL      10-05 @ 07:01  -  10-06 @ 07:00  --------------------------------------------------------  IN: 2233.6 mL / OUT: 1245 mL / NET: 988.6 mL    10-06 @ 07:01  -  10-07 @ 06:41  --------------------------------------------------------  IN: 3330 mL / OUT: 1973 mL / NET: 1357 mL        Physical Exam:     Gen: NAD well nourished  Neuro: A&OX3 No deficits  CV:RRR Reg s1s2 no M  Pulm: CTA b/l No w/r/r  Abd: Soft + appropriate tenderness at incision site. + Midline prevena vac noted with minimal drainage. + BS  Ext: No C/C/E warm well perfused  Vasc: + DP b/l   Skin: no rashes noted  MSK: No joint swelling  Psych: No signs of anxiety or depression      LABS:  ABG - ( 05 Oct 2020 22:50 )  pH, Arterial: 7.37  pH, Blood: x     /  pCO2: 32    /  pO2: 253   / HCO3: 18    / Base Excess: -5.8  /  SaO2: 98                  CBC Full  -  ( 07 Oct 2020 05:03 )  WBC Count : 11.74 K/uL  RBC Count : 2.56 M/uL  Hemoglobin : 7.5 g/dL  Hematocrit : 23.0 %  Platelet Count - Automated : 202 K/uL  Mean Cell Volume : 89.8 fl  Mean Cell Hemoglobin : 29.3 pg  Mean Cell Hemoglobin Concentration : 32.6 gm/dL  Auto Neutrophil # : x  Auto Lymphocyte # : x  Auto Monocyte # : x  Auto Eosinophil # : x  Auto Basophil # : x  Auto Neutrophil % : x  Auto Lymphocyte % : x  Auto Monocyte % : x  Auto Eosinophil % : x  Auto Basophil % : x    10-07    135  |  105  |  9   ----------------------------<  160<H>  3.5   |  21<L>  |  0.66    Ca    7.3<L>      07 Oct 2020 05:03  Phos  2.1     10-07  Mg     2.0     10-07    TPro  4.5<L>  /  Alb  2.5<L>  /  TBili  0.2  /  DBili  x   /  AST  33  /  ALT  25  /  AlkPhos  48  10-06    PT/INR - ( 05 Oct 2020 21:31 )   PT: 13.7 sec;   INR: 1.15          PTT - ( 05 Oct 2020 21:31 )  PTT:28.9 sec      Urinalysis Basic - ( 05 Oct 2020 11:43 )    Color: Yellow / Appearance: Clear / SG: >=1.030 / pH: x  Gluc: x / Ketone: >=80 mg/dL  / Bili: Negative / Urobili: 0.2 E.U./dL   Blood: x / Protein: 100 mg/dL / Nitrite: NEGATIVE   Leuk Esterase: NEGATIVE / RBC: < 5 /HPF / WBC < 5 /HPF   Sq Epi: x / Non Sq Epi: 0-5 /HPF / Bacteria: Present /HPF              RADIOLOGY & ADDITIONAL STUDIES (The following images were personally reviewed):          A/p:  75F PMHx RA, DM, hypothyroidism, cataracts, ?neuromuscular disease, PSHx lap willem, lap appy, b/l oophorectomy for ovarian cysts a/w SBO, questionable bowel ischemia, now s/p exploratory laparotomy, SBR and primary anastomosis (10/05/2020) in stable condition.    NEURO: Dilaudid PRN Zofran   CV: stable, goal of MAP >65 LR @110- d/c. Hgb 7.5 Repeat 8.1 most likely dilutional as pt now mobilizing fluid. no sign of bleed will repeat this afternoon.   PULM: Extubated 10/06 Satting well on  RA, IS  GI/FEN: NPO awaiting flatus   : Jeanie alfredo dc today will discuss with surgical team   ENDO: ISS FS improved overnight. synthroid  ID: zosyn (10/5-), fluconazole (10/5-), ID Dr. Ayala consulted  PPX: SCDs, SQH. protonix  LINES: PIVs x2, Left Radial line (10/05- 10/7)   WOUNDS/DRAINS: Prevena vac (incisional)  PT/OT: ordered 10/6 oob to chair, OT ordered rec STANLEY   Interval Events:  No adverse events overnight.   Patient seen and examined at bedside.      Allergies    gabapentin (Rash)  NSAIDs (Unknown)  penicillin (Unknown)  tramadol (Hives)    Intolerances        Vital Signs Last 24 Hrs  T(C): 36.7 (07 Oct 2020 05:00), Max: 37.7 (07 Oct 2020 00:53)  T(F): 98 (07 Oct 2020 05:00), Max: 99.8 (07 Oct 2020 00:53)  HR: 93 (07 Oct 2020 06:00) (85 - 107)  BP: 102/69 (07 Oct 2020 06:00) (92/48 - 133/51)  BP(mean): 82 (07 Oct 2020 06:00) (63 - 84)  RR: 26 (07 Oct 2020 06:00) (13 - 26)  SpO2: 96% (07 Oct 2020 06:00) (94% - 100%)    10-05 @ 07:01  -  10-06 @ 07:00  --------------------------------------------------------  IN: 2233.6 mL / OUT: 1245 mL / NET: 988.6 mL    10-06 @ 07:01  -  10-07 @ 06:41  --------------------------------------------------------  IN: 3330 mL / OUT: 1973 mL / NET: 1357 mL      10-05 @ 07:01  -  10-06 @ 07:00  --------------------------------------------------------  IN: 2233.6 mL / OUT: 1245 mL / NET: 988.6 mL    10-06 @ 07:01  -  10-07 @ 06:41  --------------------------------------------------------  IN: 3330 mL / OUT: 1973 mL / NET: 1357 mL        Physical Exam:     Gen: NAD well nourished  Neuro: A&OX3 No deficits  CV:RRR Reg s1s2 no M  Pulm: CTA b/l No w/r/r  Abd: Soft + appropriate tenderness at incision site. + Midline prevena vac noted with minimal drainage. + BS  Ext: No C/C/E warm well perfused  Vasc: + DP b/l   Skin: no rashes noted  MSK: No joint swelling  Psych: No signs of anxiety or depression      LABS:  ABG - ( 05 Oct 2020 22:50 )  pH, Arterial: 7.37  pH, Blood: x     /  pCO2: 32    /  pO2: 253   / HCO3: 18    / Base Excess: -5.8  /  SaO2: 98                  CBC Full  -  ( 07 Oct 2020 05:03 )  WBC Count : 11.74 K/uL  RBC Count : 2.56 M/uL  Hemoglobin : 7.5 g/dL  Hematocrit : 23.0 %  Platelet Count - Automated : 202 K/uL  Mean Cell Volume : 89.8 fl  Mean Cell Hemoglobin : 29.3 pg  Mean Cell Hemoglobin Concentration : 32.6 gm/dL  Auto Neutrophil # : x  Auto Lymphocyte # : x  Auto Monocyte # : x  Auto Eosinophil # : x  Auto Basophil # : x  Auto Neutrophil % : x  Auto Lymphocyte % : x  Auto Monocyte % : x  Auto Eosinophil % : x  Auto Basophil % : x    10-07    135  |  105  |  9   ----------------------------<  160<H>  3.5   |  21<L>  |  0.66    Ca    7.3<L>      07 Oct 2020 05:03  Phos  2.1     10-07  Mg     2.0     10-07    TPro  4.5<L>  /  Alb  2.5<L>  /  TBili  0.2  /  DBili  x   /  AST  33  /  ALT  25  /  AlkPhos  48  10-06    PT/INR - ( 05 Oct 2020 21:31 )   PT: 13.7 sec;   INR: 1.15          PTT - ( 05 Oct 2020 21:31 )  PTT:28.9 sec      Urinalysis Basic - ( 05 Oct 2020 11:43 )    Color: Yellow / Appearance: Clear / SG: >=1.030 / pH: x  Gluc: x / Ketone: >=80 mg/dL  / Bili: Negative / Urobili: 0.2 E.U./dL   Blood: x / Protein: 100 mg/dL / Nitrite: NEGATIVE   Leuk Esterase: NEGATIVE / RBC: < 5 /HPF / WBC < 5 /HPF   Sq Epi: x / Non Sq Epi: 0-5 /HPF / Bacteria: Present /HPF              RADIOLOGY & ADDITIONAL STUDIES (The following images were personally reviewed):          A/p:  75F PMHx RA, DM, hypothyroidism, cataracts, ?neuromuscular disease, PSHx lap willem, lap appy, b/l oophorectomy for ovarian cysts. Presented with SBO bowel ischemia, now s/p exploratory laparotomy, SBR and primary anastomosis (10/05/2020) in stable condition.    NEURO: Dilaudid PRN Zofran   CV: stable, goal of MAP >65 LR @110- d/c. Hgb 7.5 Repeat 8.1 most likely dilutional as pt now mobilizing fluid. no sign of bleed will repeat this afternoon.   PULM: Extubated 10/06 Satting well on  RA, IS  GI/FEN: NPO awaiting flatus   : juni, Jeanie dc today will discuss with surgical team   ENDO: ISS FS improved overnight. synthroid  ID: zosyn (10/5-), fluconazole (10/5-), ID Dr. Ayala consulted  PPX: SCDs, SQH. protonix  LINES: PIVs x2, Left Radial line (10/05- 10/7)   WOUNDS/DRAINS: Prevena vac (incisional)  PT/OT: ordered 10/6 oob to chair, OT ordered rec STANLEY   Interval Events:  No adverse events overnight.   Patient seen and examined at bedside. No SOB or CP      Allergies    gabapentin (Rash)  NSAIDs (Unknown)  penicillin (Unknown)  tramadol (Hives)    Intolerances        Vital Signs Last 24 Hrs  T(C): 36.7 (07 Oct 2020 05:00), Max: 37.7 (07 Oct 2020 00:53)  T(F): 98 (07 Oct 2020 05:00), Max: 99.8 (07 Oct 2020 00:53)  HR: 93 (07 Oct 2020 06:00) (85 - 107)  BP: 102/69 (07 Oct 2020 06:00) (92/48 - 133/51)  BP(mean): 82 (07 Oct 2020 06:00) (63 - 84)  RR: 26 (07 Oct 2020 06:00) (13 - 26)  SpO2: 96% (07 Oct 2020 06:00) (94% - 100%)    10-05 @ 07:01  -  10-06 @ 07:00  --------------------------------------------------------  IN: 2233.6 mL / OUT: 1245 mL / NET: 988.6 mL    10-06 @ 07:01  -  10-07 @ 06:41  --------------------------------------------------------  IN: 3330 mL / OUT: 1973 mL / NET: 1357 mL      10-05 @ 07:01  -  10-06 @ 07:00  --------------------------------------------------------  IN: 2233.6 mL / OUT: 1245 mL / NET: 988.6 mL    10-06 @ 07:01  -  10-07 @ 06:41  --------------------------------------------------------  IN: 3330 mL / OUT: 1973 mL / NET: 1357 mL        Physical Exam:     Gen: NAD well nourished  Neuro: A&OX3 No deficits  CV:RRR Reg s1s2 no M  Pulm: CTA b/l No w/r/r  Abd: Soft + appropriate tenderness at incision site. + Midline prevena vac noted with minimal drainage. + BS  Ext: No C/C/E warm well perfused  Vasc: + DP b/l   Skin: no rashes noted  MSK: No joint swelling  Psych: No signs of anxiety or depression      LABS:  ABG - ( 05 Oct 2020 22:50 )  pH, Arterial: 7.37  pH, Blood: x     /  pCO2: 32    /  pO2: 253   / HCO3: 18    / Base Excess: -5.8  /  SaO2: 98                  CBC Full  -  ( 07 Oct 2020 05:03 )  WBC Count : 11.74 K/uL  RBC Count : 2.56 M/uL  Hemoglobin : 7.5 g/dL  Hematocrit : 23.0 %  Platelet Count - Automated : 202 K/uL  Mean Cell Volume : 89.8 fl  Mean Cell Hemoglobin : 29.3 pg  Mean Cell Hemoglobin Concentration : 32.6 gm/dL  Auto Neutrophil # : x  Auto Lymphocyte # : x  Auto Monocyte # : x  Auto Eosinophil # : x  Auto Basophil # : x  Auto Neutrophil % : x  Auto Lymphocyte % : x  Auto Monocyte % : x  Auto Eosinophil % : x  Auto Basophil % : x    10-07    135  |  105  |  9   ----------------------------<  160<H>  3.5   |  21<L>  |  0.66    Ca    7.3<L>      07 Oct 2020 05:03  Phos  2.1     10-07  Mg     2.0     10-07    TPro  4.5<L>  /  Alb  2.5<L>  /  TBili  0.2  /  DBili  x   /  AST  33  /  ALT  25  /  AlkPhos  48  10-06    PT/INR - ( 05 Oct 2020 21:31 )   PT: 13.7 sec;   INR: 1.15          PTT - ( 05 Oct 2020 21:31 )  PTT:28.9 sec      Urinalysis Basic - ( 05 Oct 2020 11:43 )    Color: Yellow / Appearance: Clear / SG: >=1.030 / pH: x  Gluc: x / Ketone: >=80 mg/dL  / Bili: Negative / Urobili: 0.2 E.U./dL   Blood: x / Protein: 100 mg/dL / Nitrite: NEGATIVE   Leuk Esterase: NEGATIVE / RBC: < 5 /HPF / WBC < 5 /HPF   Sq Epi: x / Non Sq Epi: 0-5 /HPF / Bacteria: Present /HPF              RADIOLOGY & ADDITIONAL STUDIES (The following images were personally reviewed):          A/p:  75F PMHx RA, DM, hypothyroidism, cataracts, ?neuromuscular disease, PSHx lap willem, lap appy, b/l oophorectomy for ovarian cysts. Presented with SBO bowel ischemia, now s/p exploratory laparotomy, SBR and primary anastomosis (10/05/2020) in stable condition.    NEURO: Dilaudid PRN Zofran   CV: stable, goal of MAP >65 LR @110- d/c. Hgb 7.5 Repeat 8.1 most likely dilutional as pt now mobilizing fluid. no sign of bleed will repeat this afternoon.   PULM: Extubated 10/06 Satting well on  RA, IS  GI/FEN: NPO awaiting flatus   : Jeanie alfredo dc today will discuss with surgical team   ENDO: ISS FS improved overnight. synthroid  ID: zosyn (10/5-), fluconazole (10/5-), ID Dr. Ayala consulted  PPX: SCDs, SQH. protonix  LINES: PIVs x2, Left Radial line (10/05- 10/7)   WOUNDS/DRAINS: Prevena vac (incisional)  PT/OT: ordered 10/6 oob to chair, OT ordered rec STANLEY

## 2020-10-07 NOTE — PROGRESS NOTE ADULT - ASSESSMENT
75F PMHx RA, DM, hypothyroidism, cataracts, ?neuromuscular disease, PSHx lap willem, lap appy, b/l oophorectomy for ovarian cysts a/w SBO, questionable bowel ischemia, POD#2 s/p exploratory laparotomy, SBR and primary anastomosis

## 2020-10-07 NOTE — PROGRESS NOTE ADULT - SUBJECTIVE AND OBJECTIVE BOX
INTERVAL HISTORY:  s/p emergency surgery for adhesions  	  MEDICATIONS:    fluconAZOLE IVPB 400 milliGRAM(s) IV Intermittent every 24 hours  piperacillin/tazobactam IVPB.. 3.375 Gram(s) IV Intermittent every 6 hours      HYDROmorphone  Injectable 1 milliGRAM(s) IV Push every 6 hours PRN  HYDROmorphone  Injectable 0.5 milliGRAM(s) IV Push every 6 hours PRN  ondansetron Injectable 4 milliGRAM(s) IV Push every 6 hours PRN    pantoprazole  Injectable 40 milliGRAM(s) IV Push daily    dextrose 40% Gel 15 Gram(s) Oral once PRN  dextrose 50% Injectable 12.5 Gram(s) IV Push once  dextrose 50% Injectable 25 Gram(s) IV Push once  dextrose 50% Injectable 25 Gram(s) IV Push once  glucagon  Injectable 1 milliGRAM(s) IntraMuscular once PRN  insulin lispro (HumaLOG) corrective regimen sliding scale   SubCutaneous Before meals and at bedtime  levothyroxine Injectable 110 MICROGram(s) IV Push at bedtime    chlorhexidine 2% Cloths 1 Application(s) Topical daily  dextrose 5%. 1000 milliLiter(s) IV Continuous <Continuous>  heparin   Injectable 5000 Unit(s) SubCutaneous every 8 hours  lactated ringers. 1000 milliLiter(s) IV Continuous <Continuous>  potassium phosphate IVPB 30 milliMole(s) IV Intermittent once        PHYSICAL EXAM:  T(C): 36.7 (10-07-20 @ 05:00), Max: 37.7 (10-07-20 @ 00:53)  HR: 91 (10-07-20 @ 07:00) (85 - 107)  BP: 111/56 (10-07-20 @ 07:00) (92/48 - 133/51)  RR: 24 (10-07-20 @ 07:00) (13 - 26)  SpO2: 96% (10-07-20 @ 07:00) (94% - 100%)  Wt(kg): --  I&O's Summary    06 Oct 2020 07:01  -  07 Oct 2020 07:00  --------------------------------------------------------  IN: 3380 mL / OUT: 2373 mL / NET: 1007 mL    07 Oct 2020 07:01  -  07 Oct 2020 07:45  --------------------------------------------------------  IN: 60 mL / OUT: 0 mL / NET: 60 mL          Appearance: Fatigued  Cardiovascular: Normal S1 S2, No JVD, No murmurs, No edema  Respiratory: Lungs clear to auscultation	  Psychiatry: A & O x 3, Mood & affect appropriate  Gastrointestinal:  Soft, slightly tender, reduced BS	  Skin: No rashes, No ecchymoses, No cyanosis  Neurologic: Non-focal  Extremities:  No clubbing, cyanosis or edema  Vascular: Peripheral pulses palpable 2+ bilaterally    TELEMETRY: 	  NSR, IVCD  ECG:  	  RADIOLOGY:   DIAGNOSTIC TESTING:  [ ] Echocardiogram:  [ ]  Catheterization:  [ ] Stress Test:    OTHER: 	    LABS:	 	    CARDIAC MARKERS:                                  8.1    12.96 )-----------( 219      ( 07 Oct 2020 07:11 )             24.4     10-07    135  |  105  |  9   ----------------------------<  160<H>  3.5   |  21<L>  |  0.66    Ca    7.3<L>      07 Oct 2020 05:03  Phos  2.1     10-07  Mg     2.0     10-07    TPro  4.5<L>  /  Alb  2.5<L>  /  TBili  0.2  /  DBili  x   /  AST  33  /  ALT  25  /  AlkPhos  48  10-06    proBNP:   Lipid Profile:   HgA1c:   TSH:     ASSESSMENT/PLAN:

## 2020-10-07 NOTE — OCCUPATIONAL THERAPY INITIAL EVALUATION ADULT - MD ORDER
75F PMHx RA, DM, hypothyroidism, cataracts, ?neuromuscular disease, PSHx lap willem, lap appy, b/l oophorectomy for ovarian cysts a/w SBO, questionable bowel ischemia, now s/p exploratory laparotomy, SBR and primary anastomosis (10/05/2020) in stable condition.

## 2020-10-07 NOTE — PROGRESS NOTE ADULT - ASSESSMENT
75F PMHx RA, DM, hypothyroidism, cataracts, ?neuromuscular disease, PSHx lap willem, lap appy, b/l oophorectomy for ovarian cysts a/w SBO, questionable bowel ischemia, now s/p exploratory laparotomy, SBR and primary anastomosis (10/05/2020) in stable condition.    -ok to d/c juni, TOV  -ok to stepdown  -rest of care per [facility]  -discussed with Dr. Hines

## 2020-10-07 NOTE — OCCUPATIONAL THERAPY INITIAL EVALUATION ADULT - PLANNED THERAPY INTERVENTIONS, OT EVAL
IADL retraining/balance training/neuromuscular re-education/motor coordination training/strengthening/transfer training/bed mobility training/fine motor coordination training/ADL retraining

## 2020-10-07 NOTE — OCCUPATIONAL THERAPY INITIAL EVALUATION ADULT - ADDITIONAL COMMENTS
Pt lives alone in apt w/ elevator access. Pt states that she was independent in ADLs and mobility and required assist for some IADLs prior to admission. Pt uses a cane at baseline.

## 2020-10-08 LAB
ANION GAP SERPL CALC-SCNC: 11 MMOL/L — SIGNIFICANT CHANGE UP (ref 5–17)
BUN SERPL-MCNC: 7 MG/DL — SIGNIFICANT CHANGE UP (ref 7–23)
CALCIUM SERPL-MCNC: 8.3 MG/DL — LOW (ref 8.4–10.5)
CHLORIDE SERPL-SCNC: 107 MMOL/L — SIGNIFICANT CHANGE UP (ref 96–108)
CO2 SERPL-SCNC: 24 MMOL/L — SIGNIFICANT CHANGE UP (ref 22–31)
CREAT SERPL-MCNC: 0.59 MG/DL — SIGNIFICANT CHANGE UP (ref 0.5–1.3)
GLUCOSE SERPL-MCNC: 126 MG/DL — HIGH (ref 70–99)
HCT VFR BLD CALC: 23.8 % — LOW (ref 34.5–45)
HGB BLD-MCNC: 7.8 G/DL — LOW (ref 11.5–15.5)
MAGNESIUM SERPL-MCNC: 2.2 MG/DL — SIGNIFICANT CHANGE UP (ref 1.6–2.6)
MCHC RBC-ENTMCNC: 29.5 PG — SIGNIFICANT CHANGE UP (ref 27–34)
MCHC RBC-ENTMCNC: 32.8 GM/DL — SIGNIFICANT CHANGE UP (ref 32–36)
MCV RBC AUTO: 90.2 FL — SIGNIFICANT CHANGE UP (ref 80–100)
NRBC # BLD: 0 /100 WBCS — SIGNIFICANT CHANGE UP (ref 0–0)
PHOSPHATE SERPL-MCNC: 1.7 MG/DL — LOW (ref 2.5–4.5)
PLATELET # BLD AUTO: 251 K/UL — SIGNIFICANT CHANGE UP (ref 150–400)
POTASSIUM SERPL-MCNC: 3.8 MMOL/L — SIGNIFICANT CHANGE UP (ref 3.5–5.3)
POTASSIUM SERPL-SCNC: 3.8 MMOL/L — SIGNIFICANT CHANGE UP (ref 3.5–5.3)
RBC # BLD: 2.64 M/UL — LOW (ref 3.8–5.2)
RBC # FLD: 14.4 % — SIGNIFICANT CHANGE UP (ref 10.3–14.5)
SODIUM SERPL-SCNC: 142 MMOL/L — SIGNIFICANT CHANGE UP (ref 135–145)
WBC # BLD: 13.58 K/UL — HIGH (ref 3.8–10.5)
WBC # FLD AUTO: 13.58 K/UL — HIGH (ref 3.8–10.5)

## 2020-10-08 PROCEDURE — 99232 SBSQ HOSP IP/OBS MODERATE 35: CPT

## 2020-10-08 RX ORDER — OXYCODONE HYDROCHLORIDE 5 MG/1
10 TABLET ORAL EVERY 6 HOURS
Refills: 0 | Status: DISCONTINUED | OUTPATIENT
Start: 2020-10-08 | End: 2020-10-12

## 2020-10-08 RX ORDER — SODIUM CHLORIDE 9 MG/ML
1000 INJECTION, SOLUTION INTRAVENOUS
Refills: 0 | Status: DISCONTINUED | OUTPATIENT
Start: 2020-10-08 | End: 2020-10-09

## 2020-10-08 RX ORDER — ACETAMINOPHEN 500 MG
650 TABLET ORAL EVERY 6 HOURS
Refills: 0 | Status: DISCONTINUED | OUTPATIENT
Start: 2020-10-08 | End: 2020-10-12

## 2020-10-08 RX ORDER — LEVOTHYROXINE SODIUM 125 MCG
137 TABLET ORAL AT BEDTIME
Refills: 0 | Status: DISCONTINUED | OUTPATIENT
Start: 2020-10-08 | End: 2020-10-09

## 2020-10-08 RX ORDER — OXYCODONE HYDROCHLORIDE 5 MG/1
5 TABLET ORAL EVERY 4 HOURS
Refills: 0 | Status: DISCONTINUED | OUTPATIENT
Start: 2020-10-08 | End: 2020-10-12

## 2020-10-08 RX ORDER — BENZOCAINE AND MENTHOL 5; 1 G/100ML; G/100ML
1 LIQUID ORAL
Refills: 0 | Status: DISCONTINUED | OUTPATIENT
Start: 2020-10-08 | End: 2020-10-12

## 2020-10-08 RX ORDER — HYDROMORPHONE HYDROCHLORIDE 2 MG/ML
0.5 INJECTION INTRAMUSCULAR; INTRAVENOUS; SUBCUTANEOUS EVERY 4 HOURS
Refills: 0 | Status: DISCONTINUED | OUTPATIENT
Start: 2020-10-08 | End: 2020-10-12

## 2020-10-08 RX ORDER — POTASSIUM PHOSPHATE, MONOBASIC POTASSIUM PHOSPHATE, DIBASIC 236; 224 MG/ML; MG/ML
21 INJECTION, SOLUTION INTRAVENOUS ONCE
Refills: 0 | Status: COMPLETED | OUTPATIENT
Start: 2020-10-08 | End: 2020-10-08

## 2020-10-08 RX ADMIN — HYDROMORPHONE HYDROCHLORIDE 0.5 MILLIGRAM(S): 2 INJECTION INTRAMUSCULAR; INTRAVENOUS; SUBCUTANEOUS at 16:12

## 2020-10-08 RX ADMIN — HEPARIN SODIUM 5000 UNIT(S): 5000 INJECTION INTRAVENOUS; SUBCUTANEOUS at 18:33

## 2020-10-08 RX ADMIN — Medication 2: at 22:29

## 2020-10-08 RX ADMIN — PIPERACILLIN AND TAZOBACTAM 200 GRAM(S): 4; .5 INJECTION, POWDER, LYOPHILIZED, FOR SOLUTION INTRAVENOUS at 09:32

## 2020-10-08 RX ADMIN — HEPARIN SODIUM 5000 UNIT(S): 5000 INJECTION INTRAVENOUS; SUBCUTANEOUS at 09:32

## 2020-10-08 RX ADMIN — PIPERACILLIN AND TAZOBACTAM 200 GRAM(S): 4; .5 INJECTION, POWDER, LYOPHILIZED, FOR SOLUTION INTRAVENOUS at 21:24

## 2020-10-08 RX ADMIN — Medication 137 MICROGRAM(S): at 21:37

## 2020-10-08 RX ADMIN — POTASSIUM PHOSPHATE, MONOBASIC POTASSIUM PHOSPHATE, DIBASIC 83.33 MILLIMOLE(S): 236; 224 INJECTION, SOLUTION INTRAVENOUS at 12:26

## 2020-10-08 RX ADMIN — CHLORHEXIDINE GLUCONATE 1 APPLICATION(S): 213 SOLUTION TOPICAL at 07:35

## 2020-10-08 RX ADMIN — PANTOPRAZOLE SODIUM 40 MILLIGRAM(S): 20 TABLET, DELAYED RELEASE ORAL at 09:32

## 2020-10-08 RX ADMIN — HEPARIN SODIUM 5000 UNIT(S): 5000 INJECTION INTRAVENOUS; SUBCUTANEOUS at 02:58

## 2020-10-08 RX ADMIN — PIPERACILLIN AND TAZOBACTAM 200 GRAM(S): 4; .5 INJECTION, POWDER, LYOPHILIZED, FOR SOLUTION INTRAVENOUS at 16:05

## 2020-10-08 RX ADMIN — PIPERACILLIN AND TAZOBACTAM 200 GRAM(S): 4; .5 INJECTION, POWDER, LYOPHILIZED, FOR SOLUTION INTRAVENOUS at 03:58

## 2020-10-08 RX ADMIN — FLUCONAZOLE 100 MILLIGRAM(S): 150 TABLET ORAL at 19:42

## 2020-10-08 RX ADMIN — HYDROMORPHONE HYDROCHLORIDE 0.5 MILLIGRAM(S): 2 INJECTION INTRAMUSCULAR; INTRAVENOUS; SUBCUTANEOUS at 18:34

## 2020-10-08 NOTE — PROGRESS NOTE ADULT - SUBJECTIVE AND OBJECTIVE BOX
INTERVAL HPI/OVERNIGHT EVENTS: Afebrilem (+) BM    CONSTITUTIONAL:  Negative fever or chills, feels better  EYES:  Negative  blurry vision or double vision  CARDIOVASCULAR:  Negative for chest pain or palpitations  RESPIRATORY:  Negative for cough, wheezing, or SOB   GASTROINTESTINAL:  Negative for nausea, vomiting, diarrhea, improved abdominal pain  GENITOURINARY:  Negative frequency, urgency or dysuria  NEUROLOGIC:  No headache, confusion, dizziness, lightheadedness      ANTIBIOTICS/RELEVANT:    MEDICATIONS  (STANDING):  chlorhexidine 2% Cloths 1 Application(s) Topical daily  dextrose 5% + sodium chloride 0.45%. 1000 milliLiter(s) (60 mL/Hr) IV Continuous <Continuous>  dextrose 5%. 1000 milliLiter(s) (50 mL/Hr) IV Continuous <Continuous>  dextrose 50% Injectable 12.5 Gram(s) IV Push once  dextrose 50% Injectable 25 Gram(s) IV Push once  dextrose 50% Injectable 25 Gram(s) IV Push once  fluconAZOLE IVPB 400 milliGRAM(s) IV Intermittent every 24 hours  heparin   Injectable 5000 Unit(s) SubCutaneous every 8 hours  insulin lispro (HumaLOG) corrective regimen sliding scale   SubCutaneous Before meals and at bedtime  levothyroxine 137 MICROGram(s) Oral at bedtime  pantoprazole  Injectable 40 milliGRAM(s) IV Push daily  piperacillin/tazobactam IVPB.. 3.375 Gram(s) IV Intermittent every 6 hours    MEDICATIONS  (PRN):  acetaminophen   Tablet .. 650 milliGRAM(s) Oral every 6 hours PRN Mild Pain (1 - 3)  benzocaine 15 mG/menthol 3.6 mG (Sugar-Free) Lozenge 1 Lozenge Oral two times a day PRN Sore Throat  dextrose 40% Gel 15 Gram(s) Oral once PRN Blood Glucose LESS THAN 70 milliGRAM(s)/deciliter  glucagon  Injectable 1 milliGRAM(s) IntraMuscular once PRN Glucose LESS THAN 70 milligrams/deciliter  HYDROmorphone  Injectable 0.5 milliGRAM(s) IV Push every 4 hours PRN breakthrough  ondansetron Injectable 4 milliGRAM(s) IV Push every 6 hours PRN Nausea  oxyCODONE    IR 5 milliGRAM(s) Oral every 4 hours PRN Moderate Pain (4 - 6)  oxyCODONE    IR 10 milliGRAM(s) Oral every 6 hours PRN Severe Pain (7 - 10)        Vital Signs Last 24 Hrs  T(C): 37.1 (08 Oct 2020 17:11), Max: 37.6 (07 Oct 2020 19:57)  T(F): 98.7 (08 Oct 2020 17:11), Max: 99.7 (08 Oct 2020 01:00)  HR: 84 (08 Oct 2020 17:11) (70 - 107)  BP: 151/63 (08 Oct 2020 17:11) (106/76 - 151/63)  BP(mean): --  RR: 20 (08 Oct 2020 17:11) (16 - 20)  SpO2: 94% (08 Oct 2020 17:11) (94% - 100%)    10-07-20 @ 07:01  -  10-08-20 @ 07:00  --------------------------------------------------------  IN: 1649.9 mL / OUT: 2500 mL / NET: -850.1 mL    10-08-20 @ 07:01  -  10-08-20 @ 19:52  --------------------------------------------------------  IN: 880 mL / OUT: 700 mL / NET: 180 mL      PHYSICAL EXAM:  Constitutional:Well-developed, well nourished  Eyes:TYSON, EOMI  Ear/Nose/Throat: no oral lesion, no sinus tenderness on percussion	  Neck:no JVD, no lymphadenopathy, supple  Respiratory: CTA sridhar  Cardiovascular: S1S2 RRR, no murmurs  Gastrointestinal:soft, (+) BS, no HSM  Extremities:no e/e/c  Vascular: DP Pulse:	right normal; left normal      LABS:                        7.8    13.58 )-----------( 251      ( 08 Oct 2020 06:11 )             23.8     10-08    142  |  107  |  7   ----------------------------<  126<H>  3.8   |  24  |  0.59    Ca    8.3<L>      08 Oct 2020 06:11  Phos  1.7     10-08  Mg     2.2     10-08      MICROBIOLOGY:  Culture - Body Fluid with Gram Stain (10.06.20 @ 01:41)    Gram Stain:   Rare White blood cells  Rare Gram Positive Cocci    Specimen Source: .Body Fluid Intraabdominal Fluid #1    Culture Results:   No growth to date        RADIOLOGY & ADDITIONAL STUDIES:

## 2020-10-08 NOTE — PROGRESS NOTE ADULT - ASSESSMENT
75F PMHx RA, DM, hypothyroidism, cataracts, ?neuromuscular disease, PSHx lap willem, lap appy, b/l oophorectomy for ovarian cysts a/w SBO, with bowel ischemia, now s/p exploratory laparotomy, SBR and primary anastomosis (10/05/2020).    Dilaudid PRN Zofran   CV: stable, goal of MAP >65 LR @60  NPO awaiting flatus   ENDO: ISS FS improved overnight. synthroid  ID: zosyn (10/5-), fluconazole (10/5-), ID Dr. Ayala consulted  SCDs, SQH. protonix  Prevena vac (incisional)  PT/OT: ordered 10/6 oob to chair, OT ordered rec STANLEY

## 2020-10-08 NOTE — PROGRESS NOTE ADULT - SUBJECTIVE AND OBJECTIVE BOX
INTERVAL HPI/OVERNIGHT EVENTS: NAEON. Tachy to 120. Started on IVF and bolused 500cc. Duplex negative for DVT. Pain well controlled. F-/BM-.     STATUS POST: ex lap, reduction of incarcerated SB, FRANCISCO, SBR (120 cm ileum), primary anastomosis on 10/5    POST OPERATIVE DAY #: 3    SUBJECTIVE: Pt seen and examined at bedside this am by surgery team. Pain well controlled. Reporting having BM, denies passing flatus. Denies f/n/v/cp/sob.    MEDICATIONS  (STANDING):  chlorhexidine 2% Cloths 1 Application(s) Topical daily  dextrose 5%. 1000 milliLiter(s) (50 mL/Hr) IV Continuous <Continuous>  dextrose 50% Injectable 12.5 Gram(s) IV Push once  dextrose 50% Injectable 25 Gram(s) IV Push once  dextrose 50% Injectable 25 Gram(s) IV Push once  fluconAZOLE IVPB 400 milliGRAM(s) IV Intermittent every 24 hours  heparin   Injectable 5000 Unit(s) SubCutaneous every 8 hours  insulin lispro (HumaLOG) corrective regimen sliding scale   SubCutaneous Before meals and at bedtime  lactated ringers. 1000 milliLiter(s) (60 mL/Hr) IV Continuous <Continuous>  levothyroxine Injectable 110 MICROGram(s) IV Push at bedtime  pantoprazole  Injectable 40 milliGRAM(s) IV Push daily  piperacillin/tazobactam IVPB.. 3.375 Gram(s) IV Intermittent every 6 hours    MEDICATIONS  (PRN):  dextrose 40% Gel 15 Gram(s) Oral once PRN Blood Glucose LESS THAN 70 milliGRAM(s)/deciliter  glucagon  Injectable 1 milliGRAM(s) IntraMuscular once PRN Glucose LESS THAN 70 milligrams/deciliter  HYDROmorphone  Injectable 1 milliGRAM(s) IV Push every 6 hours PRN Severe Pain (7 - 10)  HYDROmorphone  Injectable 0.5 milliGRAM(s) IV Push every 6 hours PRN Moderate Pain (4 - 6)  ondansetron Injectable 4 milliGRAM(s) IV Push every 6 hours PRN Nausea      Vital Signs Last 24 Hrs  T(C): 36.8 (08 Oct 2020 06:04), Max: 37.6 (07 Oct 2020 19:01)  T(F): 98.3 (08 Oct 2020 06:04), Max: 99.7 (08 Oct 2020 01:00)  HR: 88 (08 Oct 2020 06:00) (72 - 122)  BP: 123/59 (08 Oct 2020 06:00) (98/49 - 141/63)  BP(mean): 79 (07 Oct 2020 11:00) (70 - 90)  RR: 20 (08 Oct 2020 06:00) (18 - 21)  SpO2: 96% (08 Oct 2020 06:00) (93% - 100%)    PHYSICAL EXAM:    Constitutional: A&Ox3, NAD    Respiratory: non labored breathing, no respiratory distress    Cardiovascular: NSR, RRR    Gastrointestinal: abdomen soft, nd, appropriately ttp to incisions, prevena intact with good seal    Extremities: wwp      I&O's Detail    07 Oct 2020 07:01  -  08 Oct 2020 07:00  --------------------------------------------------------  IN:    IV PiggyBack: 549.9 mL    IV PiggyBack: 200 mL    Lactated Ringers: 180 mL    Lactated Ringers: 720 mL  Total IN: 1649.9 mL    OUT:    Indwelling Catheter - Urethral (mL): 800 mL    VAC (Vacuum Assisted Closure) System (mL): 0 mL    Voided (mL): 1700 mL  Total OUT: 2500 mL    Total NET: -850.1 mL          LABS:                        7.8    13.58 )-----------( 251      ( 08 Oct 2020 06:11 )             23.8     10-08    x   |  107  |  7   ----------------------------<  126<H>  3.8   |  24  |  0.59    Ca    8.3<L>      08 Oct 2020 06:11  Phos  1.7     10-08  Mg     2.2     10-08            RADIOLOGY & ADDITIONAL STUDIES: INTERVAL HPI/OVERNIGHT EVENTS: NAEON. Tachy to 120. Started on IVF and bolused 500cc. Duplex negative for DVT. Pain well controlled. F-/BM-.     STATUS POST: ex lap, reduction of incarcerated SB, FRANCISCO, SBR (120 cm ileum), primary anastomosis on 10/5    POST OPERATIVE DAY #: 3    SUBJECTIVE: Pt seen and examined at bedside this am by surgery team. Pain well controlled. Reporting having BM, denies passing flatus. Denies f/n/v/cp/sob.    MEDICATIONS  (STANDING):  chlorhexidine 2% Cloths 1 Application(s) Topical daily  dextrose 5%. 1000 milliLiter(s) (50 mL/Hr) IV Continuous <Continuous>  dextrose 50% Injectable 12.5 Gram(s) IV Push once  dextrose 50% Injectable 25 Gram(s) IV Push once  dextrose 50% Injectable 25 Gram(s) IV Push once  fluconAZOLE IVPB 400 milliGRAM(s) IV Intermittent every 24 hours  heparin   Injectable 5000 Unit(s) SubCutaneous every 8 hours  insulin lispro (HumaLOG) corrective regimen sliding scale   SubCutaneous Before meals and at bedtime  lactated ringers. 1000 milliLiter(s) (60 mL/Hr) IV Continuous <Continuous>  levothyroxine Injectable 110 MICROGram(s) IV Push at bedtime  pantoprazole  Injectable 40 milliGRAM(s) IV Push daily  piperacillin/tazobactam IVPB.. 3.375 Gram(s) IV Intermittent every 6 hours    MEDICATIONS  (PRN):  dextrose 40% Gel 15 Gram(s) Oral once PRN Blood Glucose LESS THAN 70 milliGRAM(s)/deciliter  glucagon  Injectable 1 milliGRAM(s) IntraMuscular once PRN Glucose LESS THAN 70 milligrams/deciliter  HYDROmorphone  Injectable 1 milliGRAM(s) IV Push every 6 hours PRN Severe Pain (7 - 10)  HYDROmorphone  Injectable 0.5 milliGRAM(s) IV Push every 6 hours PRN Moderate Pain (4 - 6)  ondansetron Injectable 4 milliGRAM(s) IV Push every 6 hours PRN Nausea      Vital Signs Last 24 Hrs  T(C): 36.8 (08 Oct 2020 06:04), Max: 37.6 (07 Oct 2020 19:01)  T(F): 98.3 (08 Oct 2020 06:04), Max: 99.7 (08 Oct 2020 01:00)  HR: 88 (08 Oct 2020 06:00) (72 - 122)  BP: 123/59 (08 Oct 2020 06:00) (98/49 - 141/63)  BP(mean): 79 (07 Oct 2020 11:00) (70 - 90)  RR: 20 (08 Oct 2020 06:00) (18 - 21)  SpO2: 96% (08 Oct 2020 06:00) (93% - 100%)    PHYSICAL EXAM:    Constitutional: A&Ox3, NAD    Respiratory: non labored breathing, no respiratory distress    Cardiovascular: NSR, RRR    Gastrointestinal: abdomen soft, nd, appropriately ttp to incisions, prevena intact with good seal.     Extremities: wwp, no calf tenderness or edema. SCDs in place       I&O's Detail    07 Oct 2020 07:01  -  08 Oct 2020 07:00  --------------------------------------------------------  IN:    IV PiggyBack: 549.9 mL    IV PiggyBack: 200 mL    Lactated Ringers: 180 mL    Lactated Ringers: 720 mL  Total IN: 1649.9 mL    OUT:    Indwelling Catheter - Urethral (mL): 800 mL    VAC (Vacuum Assisted Closure) System (mL): 0 mL    Voided (mL): 1700 mL  Total OUT: 2500 mL    Total NET: -850.1 mL          LABS:                        7.8    13.58 )-----------( 251      ( 08 Oct 2020 06:11 )             23.8     10-08    x   |  107  |  7   ----------------------------<  126<H>  3.8   |  24  |  0.59    Ca    8.3<L>      08 Oct 2020 06:11  Phos  1.7     10-08  Mg     2.2     10-08            RADIOLOGY & ADDITIONAL STUDIES:

## 2020-10-08 NOTE — PROGRESS NOTE ADULT - SUBJECTIVE AND OBJECTIVE BOX
INTERVAL HPI/OVERNIGHT EVENTS:   SURGERY ATTENDING    STATUS POST:      Exploratory Laparotomy, Lysis of adhesions, Reduction of incarcerated strangulated internal hernia, Small bowel resection 120 cm of ileum with side to side functional end to end anastomosis, Abdominal washout, Drainage, Closure.      POST OPERATIVE DAY #: 3    SUBJECTIVE:  Flatus: [x ] YES [ ] NO             Bowel Movement: [x ] YES [ ] NO  Pain (0-10):         2   Pain Control Adequate: [x ] YES [ ] NO  Nausea: [ ] YES [x ] NO            Vomiting: [ ] YES [x ] NO  Diarrhea: [ ] YES [x ] NO         Constipation: [ ] YES [x ] NO     Chest Pain: [ ] YES [x ] NO    SOB:  [ ] YES [x ] NO      MEDICATIONS  (STANDING):  chlorhexidine 2% Cloths 1 Application(s) Topical daily  dextrose 5% + sodium chloride 0.45%. 1000 milliLiter(s) (60 mL/Hr) IV Continuous <Continuous>  dextrose 5%. 1000 milliLiter(s) (50 mL/Hr) IV Continuous <Continuous>  dextrose 50% Injectable 12.5 Gram(s) IV Push once  dextrose 50% Injectable 25 Gram(s) IV Push once  dextrose 50% Injectable 25 Gram(s) IV Push once  fluconAZOLE IVPB 400 milliGRAM(s) IV Intermittent every 24 hours  heparin   Injectable 5000 Unit(s) SubCutaneous every 8 hours  insulin lispro (HumaLOG) corrective regimen sliding scale   SubCutaneous Before meals and at bedtime  levothyroxine 137 MICROGram(s) Oral at bedtime  pantoprazole  Injectable 40 milliGRAM(s) IV Push daily  piperacillin/tazobactam IVPB.. 3.375 Gram(s) IV Intermittent every 6 hours    MEDICATIONS  (PRN):  acetaminophen   Tablet .. 650 milliGRAM(s) Oral every 6 hours PRN Mild Pain (1 - 3)  benzocaine 15 mG/menthol 3.6 mG (Sugar-Free) Lozenge 1 Lozenge Oral two times a day PRN Sore Throat  dextrose 40% Gel 15 Gram(s) Oral once PRN Blood Glucose LESS THAN 70 milliGRAM(s)/deciliter  glucagon  Injectable 1 milliGRAM(s) IntraMuscular once PRN Glucose LESS THAN 70 milligrams/deciliter  HYDROmorphone  Injectable 0.5 milliGRAM(s) IV Push every 4 hours PRN breakthrough  ondansetron Injectable 4 milliGRAM(s) IV Push every 6 hours PRN Nausea  oxyCODONE    IR 5 milliGRAM(s) Oral every 4 hours PRN Moderate Pain (4 - 6)  oxyCODONE    IR 10 milliGRAM(s) Oral every 6 hours PRN Severe Pain (7 - 10)      Vital Signs Last 24 Hrs  T(C): 37.1 (08 Oct 2020 17:11), Max: 37.6 (07 Oct 2020 19:01)  T(F): 98.7 (08 Oct 2020 17:11), Max: 99.7 (08 Oct 2020 01:00)  HR: 84 (08 Oct 2020 17:11) (70 - 122)  BP: 151/63 (08 Oct 2020 17:11) (106/76 - 151/63)  BP(mean): --  RR: 20 (08 Oct 2020 17:11) (16 - 20)  SpO2: 94% (08 Oct 2020 17:11) (93% - 100%)    PHYSICAL EXAM:      Constitutional:    Eyes:    ENMT:    Neck:    Breasts:    Back:    Respiratory:    Cardiovascular:    Gastrointestinal:    Genitourinary:    Rectal:    Extremities:    Vascular:    Neurological:    Skin:    Lymph Nodes:    Musculoskeletal:    Psychiatric:        I&O's Detail    07 Oct 2020 07:01  -  08 Oct 2020 07:00  --------------------------------------------------------  IN:    IV PiggyBack: 549.9 mL    IV PiggyBack: 200 mL    Lactated Ringers: 720 mL    Lactated Ringers: 180 mL  Total IN: 1649.9 mL    OUT:    Indwelling Catheter - Urethral (mL): 800 mL    VAC (Vacuum Assisted Closure) System (mL): 0 mL    Voided (mL): 1700 mL  Total OUT: 2500 mL    Total NET: -850.1 mL      08 Oct 2020 07:01  -  08 Oct 2020 18:00  --------------------------------------------------------  IN:    IV PiggyBack: 100 mL    Lactated Ringers: 420 mL    Oral Fluid: 360 mL  Total IN: 880 mL    OUT:    Voided (mL): 700 mL  Total OUT: 700 mL    Total NET: 180 mL          LABS:                        7.8    13.58 )-----------( 251      ( 08 Oct 2020 06:11 )             23.8     10-08    142  |  107  |  7   ----------------------------<  126<H>  3.8   |  24  |  0.59    Ca    8.3<L>      08 Oct 2020 06:11  Phos  1.7     10-08  Mg     2.2     10-08            RADIOLOGY & ADDITIONAL STUDIES:

## 2020-10-08 NOTE — PROGRESS NOTE ADULT - SUBJECTIVE AND OBJECTIVE BOX
INTERVAL HISTORY:  moved to step down  	  MEDICATIONS:  fluconAZOLE IVPB 400 milliGRAM(s) IV Intermittent every 24 hours  piperacillin/tazobactam IVPB.. 3.375 Gram(s) IV Intermittent every 6 hours      HYDROmorphone  Injectable 1 milliGRAM(s) IV Push every 6 hours PRN  HYDROmorphone  Injectable 0.5 milliGRAM(s) IV Push every 6 hours PRN  ondansetron Injectable 4 milliGRAM(s) IV Push every 6 hours PRN    pantoprazole  Injectable 40 milliGRAM(s) IV Push daily    dextrose 40% Gel 15 Gram(s) Oral once PRN  dextrose 50% Injectable 12.5 Gram(s) IV Push once  dextrose 50% Injectable 25 Gram(s) IV Push once  dextrose 50% Injectable 25 Gram(s) IV Push once  glucagon  Injectable 1 milliGRAM(s) IntraMuscular once PRN  insulin lispro (HumaLOG) corrective regimen sliding scale   SubCutaneous Before meals and at bedtime  levothyroxine Injectable 110 MICROGram(s) IV Push at bedtime    chlorhexidine 2% Cloths 1 Application(s) Topical daily  dextrose 5%. 1000 milliLiter(s) IV Continuous <Continuous>  heparin   Injectable 5000 Unit(s) SubCutaneous every 8 hours  lactated ringers. 1000 milliLiter(s) IV Continuous <Continuous>        PHYSICAL EXAM:  T(C): 36.8 (10-08-20 @ 06:04), Max: 37.6 (10-07-20 @ 19:01)  HR: 88 (10-08-20 @ 06:00) (72 - 122)  BP: 123/59 (10-08-20 @ 06:00) (98/49 - 141/63)  RR: 20 (10-08-20 @ 06:00) (18 - 21)  SpO2: 96% (10-08-20 @ 06:00) (93% - 100%)  Wt(kg): --  I&O's Summary    07 Oct 2020 07:01  -  08 Oct 2020 07:00  --------------------------------------------------------  IN: 1649.9 mL / OUT: 2500 mL / NET: -850.1 mL          Appearance: Normal	  Cardiovascular: Normal S1 S2, No JVD, No murmurs, No edema  Respiratory: Lungs clear to auscultation	  Psychiatry: A & O x 3, Mood & affect appropriate  Gastrointestinal:  Soft, Non-tender, + BS	  Skin: No rashes, No ecchymoses, No cyanosis  Neurologic: Non-focal  Extremities:   No clubbing, cyanosis or edema  Vascular: Peripheral pulses palpable 2+ bilaterally    TELEMETRY: 	    ECG:  	  RADIOLOGY:   DIAGNOSTIC TESTING:  [ ] Echocardiogram:  [ ]  Catheterization:  [ ] Stress Test:    OTHER: 	    LABS:	 	    CARDIAC MARKERS:                                  7.8    13.58 )-----------( 251      ( 08 Oct 2020 06:11 )             23.8     10-08    x   |  107  |  7   ----------------------------<  126<H>  3.8   |  24  |  0.59    Ca    8.3<L>      08 Oct 2020 06:11  Phos  1.7     10-08  Mg     2.2     10-08      proBNP:   Lipid Profile:   HgA1c:   TSH:     ASSESSMENT/PLAN:

## 2020-10-08 NOTE — CHART NOTE - NSCHARTNOTEFT_GEN_A_CORE
Admitting Diagnosis:   Patient is a 75y old  Female who presents with a chief complaint of SBO (08 Oct 2020 07:38)    Consult: Yes [   ]  No [ x  ]    Reason for Initial Nutrition Assessment: LOS Nutrition Assessment     PAST MEDICAL & SURGICAL HISTORY:  Degenerative disc disease, lumbar  Cataracts, bilateral  Rheumatoid arthritis  DM type 2 (diabetes mellitus, type 2)  S/P carpal tunnel release  H/O shoulder surgery  History of bilateral oophorectomies  History of appendectomy  History of cholecystectomy    Current Nutrition Order: CST CHO CLD diet with no snacks    PO Intake: Good (%) [   ]  Fair (50-75%) [ x  ] Poor (<25%) [   ]- Pt consumed ~50% of meals per diet recall    GI Issues:   WDL, +F, last BM 10/8 per pt  No n/v/d/c noted  No abd distention noted    Pain:  No pain noted at this time    Skin Integrity:  Surgical incision, alvino score 17  No edema present  No pressure ulcers noted    Labs:   10-08    142  |  107  |  7   ----------------------------<  126<H>  3.8   |  24  |  0.59    Ca    8.3<L>      08 Oct 2020 06:11  Phos  1.7     10-08  Mg     2.2     10-08    CAPILLARY BLOOD GLUCOSE    POCT Blood Glucose.: 114 mg/dL (08 Oct 2020 07:31)  POCT Blood Glucose.: 141 mg/dL (07 Oct 2020 21:36)  POCT Blood Glucose.: 139 mg/dL (07 Oct 2020 17:27)  POCT Blood Glucose.: 129 mg/dL (07 Oct 2020 11:44)    Medications:  MEDICATIONS  (STANDING):  chlorhexidine 2% Cloths 1 Application(s) Topical daily  dextrose 5%. 1000 milliLiter(s) (50 mL/Hr) IV Continuous <Continuous>  dextrose 50% Injectable 12.5 Gram(s) IV Push once  dextrose 50% Injectable 25 Gram(s) IV Push once  dextrose 50% Injectable 25 Gram(s) IV Push once  fluconAZOLE IVPB 400 milliGRAM(s) IV Intermittent every 24 hours  heparin   Injectable 5000 Unit(s) SubCutaneous every 8 hours  insulin lispro (HumaLOG) corrective regimen sliding scale   SubCutaneous Before meals and at bedtime  lactated ringers. 1000 milliLiter(s) (60 mL/Hr) IV Continuous <Continuous>  levothyroxine Injectable 110 MICROGram(s) IV Push at bedtime  pantoprazole  Injectable 40 milliGRAM(s) IV Push daily  piperacillin/tazobactam IVPB.. 3.375 Gram(s) IV Intermittent every 6 hours  potassium phosphate IVPB 21 milliMole(s) IV Intermittent once    MEDICATIONS  (PRN):  dextrose 40% Gel 15 Gram(s) Oral once PRN Blood Glucose LESS THAN 70 milliGRAM(s)/deciliter  glucagon  Injectable 1 milliGRAM(s) IntraMuscular once PRN Glucose LESS THAN 70 milligrams/deciliter  HYDROmorphone  Injectable 1 milliGRAM(s) IV Push every 6 hours PRN Severe Pain (7 - 10)  HYDROmorphone  Injectable 0.5 milliGRAM(s) IV Push every 6 hours PRN Moderate Pain (4 - 6)  ondansetron Injectable 4 milliGRAM(s) IV Push every 6 hours PRN Nausea    Admitted Anthropometrics:  Height: 61" Weight: 160lbs, IBW 105lbs+/-10%, %%, BMI 28.2 kg/m2    Weight:  10/5 160lbs    Weight Change: UBW consistent with admission weight. Pt noting some slight weight gain since the pandemic likely 2/2 stress eating, but otherwise weight remains stable. Please cont to trend weight biweekly.     Nutrition Focused Physical Exam: Completed [   ]  Unable to complete [   ]- N/A    Estimated energy needs:   IBW (48kg) used for calculations as pt >120% of IBW. Needs adjusted for advanced age and wound healing.   Kcal (20-25 kcal/kg): 960-1200 kcal  Protein (1.1-1.3 g/kg pro): 53-62 g pro  Fluids (30-35 ml/kg): 4359-1301 ml    Subjective:   75F PMHx RA, DM (A1C 6.5%), hypothyroidism, cataracts, ?neuromuscular disease, PSHx lap willem, lap appy, b/l oophorectomy for ovarian cysts a/w SBO, with bowel ischemia, now s/p exploratory laparotomy, SBR and primary anastomosis (10/05/2020). Pain and nausea well controlled. On assessment, pt is resting in bed without complaints. Currently on CST CHO CLD diet with no snacks. Pt consumed ~50% of breakfast this am (Broth and water). Pt denies n/v/d/c. No abd distention. Education provided on likely diet advancement and low fiber diet guidelines. Pt appears receptive to edu, educational handout left at bedside. Pt noting she had a "healthy" appetite PTA. Noted weight has remained stable and denies weight loss. NKFA. Please see nutrition recs below. RD to follow up per protocol.     Nutrition Diagnosis: Inadequate energy intake RT inability to meet est needs on current diet AEB meeting <50% est needs on CLD.     [  ] No active nutrition diagnosis at this time  [  ] Current medical condition precludes nutrition intervention    Goal: 1. Diet will advance within 24-48hhrs 2. Consistently meet >75% est needs    Recommendations:  1. CST CHO CLD with no snacks  >> Recommend advance to CST CHO Low fiber diet when feasible  >>Cont to encourage adequate PO intake when feasible  2. Pain and bowel regime optimization per team  3. Monitor lytes and replete prn  4. RD diet education reenforcement prn  5. Please trend weights weekly.    Education: Education provided on low fiber diet guidelines. Pt appears receptive to edu, educational handout left at bedside.     Risk Level: High [   ] Moderate [ x  ] Low [   ]

## 2020-10-08 NOTE — PROGRESS NOTE ADULT - ASSESSMENT
75F PMHx RA, DM, hypothyroidism, cataracts, ?neuromuscular disease, PSHx lap willem, lap appy, b/l oophorectomy for ovarian cysts a/w SBO, questionable bowel ischemia, POD#3 s/p exploratory laparotomy, SBR and primary anastomosis

## 2020-10-08 NOTE — PROGRESS NOTE ADULT - ATTENDING COMMENTS
Abdomen distended, soft, BS+,  Flatus+, Bm+, VAC in place, working well, start clear liquid diet, IVF, IV AAX, DVT prophylaxis, F/U LABS, VS.

## 2020-10-09 DIAGNOSIS — R65.10 SYSTEMIC INFLAMMATORY RESPONSE SYNDROME (SIRS) OF NON-INFECTIOUS ORIGIN WITHOUT ACUTE ORGAN DYSFUNCTION: ICD-10-CM

## 2020-10-09 DIAGNOSIS — M06.9 RHEUMATOID ARTHRITIS, UNSPECIFIED: ICD-10-CM

## 2020-10-09 DIAGNOSIS — D72.829 ELEVATED WHITE BLOOD CELL COUNT, UNSPECIFIED: ICD-10-CM

## 2020-10-09 DIAGNOSIS — D64.9 ANEMIA, UNSPECIFIED: ICD-10-CM

## 2020-10-09 DIAGNOSIS — E66.9 OBESITY, UNSPECIFIED: ICD-10-CM

## 2020-10-09 LAB
ANION GAP SERPL CALC-SCNC: 13 MMOL/L — SIGNIFICANT CHANGE UP (ref 5–17)
BUN SERPL-MCNC: 7 MG/DL — SIGNIFICANT CHANGE UP (ref 7–23)
CALCIUM SERPL-MCNC: 8 MG/DL — LOW (ref 8.4–10.5)
CHLORIDE SERPL-SCNC: 100 MMOL/L — SIGNIFICANT CHANGE UP (ref 96–108)
CO2 SERPL-SCNC: 24 MMOL/L — SIGNIFICANT CHANGE UP (ref 22–31)
CREAT SERPL-MCNC: 0.56 MG/DL — SIGNIFICANT CHANGE UP (ref 0.5–1.3)
GLUCOSE SERPL-MCNC: 155 MG/DL — HIGH (ref 70–99)
HCT VFR BLD CALC: 26.2 % — LOW (ref 34.5–45)
HGB BLD-MCNC: 8.6 G/DL — LOW (ref 11.5–15.5)
MAGNESIUM SERPL-MCNC: 1.9 MG/DL — SIGNIFICANT CHANGE UP (ref 1.6–2.6)
MCHC RBC-ENTMCNC: 29.8 PG — SIGNIFICANT CHANGE UP (ref 27–34)
MCHC RBC-ENTMCNC: 32.8 GM/DL — SIGNIFICANT CHANGE UP (ref 32–36)
MCV RBC AUTO: 90.7 FL — SIGNIFICANT CHANGE UP (ref 80–100)
NRBC # BLD: 0 /100 WBCS — SIGNIFICANT CHANGE UP (ref 0–0)
PHOSPHATE SERPL-MCNC: 1.9 MG/DL — LOW (ref 2.5–4.5)
PLATELET # BLD AUTO: 335 K/UL — SIGNIFICANT CHANGE UP (ref 150–400)
POTASSIUM SERPL-MCNC: 3.4 MMOL/L — LOW (ref 3.5–5.3)
POTASSIUM SERPL-SCNC: 3.4 MMOL/L — LOW (ref 3.5–5.3)
RBC # BLD: 2.89 M/UL — LOW (ref 3.8–5.2)
RBC # FLD: 14.1 % — SIGNIFICANT CHANGE UP (ref 10.3–14.5)
SODIUM SERPL-SCNC: 137 MMOL/L — SIGNIFICANT CHANGE UP (ref 135–145)
WBC # BLD: 13.25 K/UL — HIGH (ref 3.8–10.5)
WBC # FLD AUTO: 13.25 K/UL — HIGH (ref 3.8–10.5)

## 2020-10-09 PROCEDURE — 99223 1ST HOSP IP/OBS HIGH 75: CPT | Mod: GC

## 2020-10-09 PROCEDURE — 71045 X-RAY EXAM CHEST 1 VIEW: CPT | Mod: 26

## 2020-10-09 PROCEDURE — 99232 SBSQ HOSP IP/OBS MODERATE 35: CPT

## 2020-10-09 RX ORDER — ALENDRONATE SODIUM 70 MG/1
1 TABLET ORAL
Qty: 0 | Refills: 0 | DISCHARGE

## 2020-10-09 RX ORDER — FAMOTIDINE 10 MG/ML
20 INJECTION INTRAVENOUS ONCE
Refills: 0 | Status: COMPLETED | OUTPATIENT
Start: 2020-10-09 | End: 2020-10-09

## 2020-10-09 RX ORDER — METRONIDAZOLE 500 MG
500 TABLET ORAL EVERY 8 HOURS
Refills: 0 | Status: DISCONTINUED | OUTPATIENT
Start: 2020-10-09 | End: 2020-10-12

## 2020-10-09 RX ORDER — INFLUENZA VIRUS VACCINE 15; 15; 15; 15 UG/.5ML; UG/.5ML; UG/.5ML; UG/.5ML
0.5 SUSPENSION INTRAMUSCULAR ONCE
Refills: 0 | Status: DISCONTINUED | OUTPATIENT
Start: 2020-10-09 | End: 2020-10-09

## 2020-10-09 RX ORDER — INFLUENZA VIRUS VACCINE 15; 15; 15; 15 UG/.5ML; UG/.5ML; UG/.5ML; UG/.5ML
0.7 SUSPENSION INTRAMUSCULAR ONCE
Refills: 0 | Status: COMPLETED | OUTPATIENT
Start: 2020-10-09 | End: 2020-10-10

## 2020-10-09 RX ORDER — SODIUM,POTASSIUM PHOSPHATES 278-250MG
2 POWDER IN PACKET (EA) ORAL ONCE
Refills: 0 | Status: COMPLETED | OUTPATIENT
Start: 2020-10-09 | End: 2020-10-09

## 2020-10-09 RX ORDER — CODEINE SULFATE 60 MG/1
1 TABLET ORAL
Qty: 0 | Refills: 0 | DISCHARGE

## 2020-10-09 RX ORDER — CODEINE SULFATE 60 MG/1
2 TABLET ORAL
Qty: 0 | Refills: 0 | DISCHARGE

## 2020-10-09 RX ORDER — POTASSIUM CHLORIDE 20 MEQ
40 PACKET (EA) ORAL ONCE
Refills: 0 | Status: COMPLETED | OUTPATIENT
Start: 2020-10-09 | End: 2020-10-09

## 2020-10-09 RX ORDER — LEVOTHYROXINE SODIUM 125 MCG
1 TABLET ORAL
Qty: 0 | Refills: 0 | DISCHARGE

## 2020-10-09 RX ORDER — LEVOTHYROXINE SODIUM 125 MCG
100 TABLET ORAL DAILY
Refills: 0 | Status: DISCONTINUED | OUTPATIENT
Start: 2020-10-10 | End: 2020-10-12

## 2020-10-09 RX ORDER — CEFTRIAXONE 500 MG/1
2000 INJECTION, POWDER, FOR SOLUTION INTRAMUSCULAR; INTRAVENOUS EVERY 24 HOURS
Refills: 0 | Status: DISCONTINUED | OUTPATIENT
Start: 2020-10-09 | End: 2020-10-12

## 2020-10-09 RX ADMIN — PIPERACILLIN AND TAZOBACTAM 200 GRAM(S): 4; .5 INJECTION, POWDER, LYOPHILIZED, FOR SOLUTION INTRAVENOUS at 10:37

## 2020-10-09 RX ADMIN — Medication 2 PACKET(S): at 17:51

## 2020-10-09 RX ADMIN — PIPERACILLIN AND TAZOBACTAM 200 GRAM(S): 4; .5 INJECTION, POWDER, LYOPHILIZED, FOR SOLUTION INTRAVENOUS at 02:43

## 2020-10-09 RX ADMIN — Medication 100 MILLIGRAM(S): at 23:29

## 2020-10-09 RX ADMIN — PANTOPRAZOLE SODIUM 40 MILLIGRAM(S): 20 TABLET, DELAYED RELEASE ORAL at 10:37

## 2020-10-09 RX ADMIN — Medication 650 MILLIGRAM(S): at 00:08

## 2020-10-09 RX ADMIN — FLUCONAZOLE 100 MILLIGRAM(S): 150 TABLET ORAL at 20:39

## 2020-10-09 RX ADMIN — HYDROMORPHONE HYDROCHLORIDE 0.5 MILLIGRAM(S): 2 INJECTION INTRAMUSCULAR; INTRAVENOUS; SUBCUTANEOUS at 00:20

## 2020-10-09 RX ADMIN — Medication 2: at 17:50

## 2020-10-09 RX ADMIN — HEPARIN SODIUM 5000 UNIT(S): 5000 INJECTION INTRAVENOUS; SUBCUTANEOUS at 17:50

## 2020-10-09 RX ADMIN — HYDROMORPHONE HYDROCHLORIDE 0.5 MILLIGRAM(S): 2 INJECTION INTRAMUSCULAR; INTRAVENOUS; SUBCUTANEOUS at 00:08

## 2020-10-09 RX ADMIN — Medication 40 MILLIEQUIVALENT(S): at 10:38

## 2020-10-09 RX ADMIN — HEPARIN SODIUM 5000 UNIT(S): 5000 INJECTION INTRAVENOUS; SUBCUTANEOUS at 10:38

## 2020-10-09 RX ADMIN — Medication 100 MILLIGRAM(S): at 18:57

## 2020-10-09 RX ADMIN — Medication 650 MILLIGRAM(S): at 01:00

## 2020-10-09 RX ADMIN — HEPARIN SODIUM 5000 UNIT(S): 5000 INJECTION INTRAVENOUS; SUBCUTANEOUS at 02:43

## 2020-10-09 RX ADMIN — FAMOTIDINE 20 MILLIGRAM(S): 10 INJECTION INTRAVENOUS at 23:29

## 2020-10-09 RX ADMIN — CEFTRIAXONE 100 MILLIGRAM(S): 500 INJECTION, POWDER, FOR SOLUTION INTRAMUSCULAR; INTRAVENOUS at 16:49

## 2020-10-09 RX ADMIN — OXYCODONE HYDROCHLORIDE 5 MILLIGRAM(S): 5 TABLET ORAL at 21:13

## 2020-10-09 RX ADMIN — OXYCODONE HYDROCHLORIDE 5 MILLIGRAM(S): 5 TABLET ORAL at 22:08

## 2020-10-09 NOTE — PROGRESS NOTE ADULT - SUBJECTIVE AND OBJECTIVE BOX
INTERVAL HISTORY:  Slowly improving  	  MEDICATIONS:  fluconAZOLE IVPB 400 milliGRAM(s) IV Intermittent every 24 hours  piperacillin/tazobactam IVPB.. 3.375 Gram(s) IV Intermittent every 6 hours  acetaminophen   Tablet .. 650 milliGRAM(s) Oral every 6 hours PRN  HYDROmorphone  Injectable 0.5 milliGRAM(s) IV Push every 4 hours PRN  ondansetron Injectable 4 milliGRAM(s) IV Push every 6 hours PRN  oxyCODONE    IR 5 milliGRAM(s) Oral every 4 hours PRN  oxyCODONE    IR 10 milliGRAM(s) Oral every 6 hours PRN    pantoprazole  Injectable 40 milliGRAM(s) IV Push daily    dextrose 40% Gel 15 Gram(s) Oral once PRN  dextrose 50% Injectable 12.5 Gram(s) IV Push once  dextrose 50% Injectable 25 Gram(s) IV Push once  dextrose 50% Injectable 25 Gram(s) IV Push once  glucagon  Injectable 1 milliGRAM(s) IntraMuscular once PRN  insulin lispro (HumaLOG) corrective regimen sliding scale   SubCutaneous Before meals and at bedtime  levothyroxine 137 MICROGram(s) Oral at bedtime    benzocaine 15 mG/menthol 3.6 mG (Sugar-Free) Lozenge 1 Lozenge Oral two times a day PRN  chlorhexidine 2% Cloths 1 Application(s) Topical daily  dextrose 5% + sodium chloride 0.45%. 1000 milliLiter(s) IV Continuous <Continuous>  dextrose 5%. 1000 milliLiter(s) IV Continuous <Continuous>  heparin   Injectable 5000 Unit(s) SubCutaneous every 8 hours  potassium chloride    Tablet ER 40 milliEquivalent(s) Oral once  potassium phosphate / sodium phosphate Powder (PHOS-NaK) 2 Packet(s) Oral once        PHYSICAL EXAM:  T(C): 36.6 (10-09-20 @ 07:05), Max: 37.1 (10-08-20 @ 17:11)  HR: 77 (10-09-20 @ 07:05) (70 - 84)  BP: 131/60 (10-09-20 @ 07:05) (106/76 - 151/63)  RR: 16 (10-09-20 @ 07:05) (15 - 20)  SpO2: 96% (10-09-20 @ 07:05) (94% - 97%)  Wt(kg): --  I&O's Summary    08 Oct 2020 07:01  -  09 Oct 2020 07:00  --------------------------------------------------------  IN: 1840 mL / OUT: 2050 mL / NET: -210 mL          Appearance: Normal	  Cardiovascular: Normal S1 S2, No JVD, No murmurs, No edema  Respiratory: Lungs clear to auscultation	  Psychiatry: A & O x 3, Mood & affect appropriate  Gastrointestinal:  Soft, Non-tender, + BS	  Skin: No rashes, No ecchymoses, No cyanosis  Neurologic: Non-focal  Extremities:  No clubbing, cyanosis or edema  Vascular: Peripheral pulses palpable 2+ bilaterally    TELEMETRY: 	    ECG:  	  RADIOLOGY:   DIAGNOSTIC TESTING:  [ ] Echocardiogram:  [ ]  Catheterization:  [ ] Stress Test:    OTHER: 	    LABS:	 	    CARDIAC MARKERS:                                  8.6    13.25 )-----------( 335      ( 09 Oct 2020 06:44 )             26.2     10-09    137  |  100  |  7   ----------------------------<  155<H>  3.4<L>   |  24  |  0.56    Ca    8.0<L>      09 Oct 2020 06:44  Phos  1.9     10-09  Mg     1.9     10-09      proBNP:   Lipid Profile:   HgA1c:   TSH:     ASSESSMENT/PLAN:

## 2020-10-09 NOTE — CONSULT NOTE ADULT - SUBJECTIVE AND OBJECTIVE BOX
Patient is a 75y old  Female who presents with a chief complaint of SBO (09 Oct 2020 07:48)      HPI:  75F PMHx RA, DM, hypothyroidism, cataracts, ?neuromuscular disease, PSHx lap willem, lap appy, b/l oophorectomy for ovarian cysts presents with abd pain x 1 day. Reports sudden onset generalized abd pain that woke her up from sleep at 3AM, progressively worsening, 10/10 in severity, a/w PO intolerance this AM and nausea, no vomiting. Had no symptoms yesterday, last BM yesterday normal, unsure of recent flatus. Unsure is she has had colonoscopy. Denies recent fevers/chills, constipation, diarrhea, hematochezia, melena, URI sx, dysuria, CP, SOB. In ED afebrile HR 59 /69 O2 100% on RA. WBC 17.4 (87% PMNs), lactate 4.7 ->2.4 s/p 2L NS. CT showing closed loop SBO in midabdomen w/ dilated ileum up to 3.0 cm with hypoenhancing walls c/f ischemia, no mass visualized. In ED given CTX/Flagyl, 4 morphine, 1.5 dilaudid. NGT placed with immediate return of 500 cc bilious fluid.  Pt was found to have SBO, with bowel ischemia, now s/p exploratory laparotomy, SBR and primary anastomosis (10/05/2020). Pt having bowel function, tolerating CLD.    Subjective:  Pt currently feels well. Denies abdominal pain, SOB, CP. ROS is otherwise negative.     Allergies    gabapentin (Rash)  NSAIDs (Unknown)  penicillin (Unknown)  tramadol (Hives)    Intolerances    Home meds: Metformin, Synthroid, codeine sulfate    MEDICATIONS  (STANDING):  cefTRIAXone   IVPB 2000 milliGRAM(s) IV Intermittent every 24 hours  dextrose 5%. 1000 milliLiter(s) (50 mL/Hr) IV Continuous <Continuous>  dextrose 50% Injectable 12.5 Gram(s) IV Push once  dextrose 50% Injectable 25 Gram(s) IV Push once  dextrose 50% Injectable 25 Gram(s) IV Push once  fluconAZOLE IVPB 400 milliGRAM(s) IV Intermittent every 24 hours  heparin   Injectable 5000 Unit(s) SubCutaneous every 8 hours  insulin lispro (HumaLOG) corrective regimen sliding scale   SubCutaneous Before meals and at bedtime  levothyroxine 137 MICROGram(s) Oral at bedtime  metroNIDAZOLE  IVPB 500 milliGRAM(s) IV Intermittent every 8 hours  pantoprazole  Injectable 40 milliGRAM(s) IV Push daily  potassium phosphate / sodium phosphate Powder (PHOS-NaK) 2 Packet(s) Oral once    MEDICATIONS  (PRN):  acetaminophen   Tablet .. 650 milliGRAM(s) Oral every 6 hours PRN Mild Pain (1 - 3)  benzocaine 15 mG/menthol 3.6 mG (Sugar-Free) Lozenge 1 Lozenge Oral two times a day PRN Sore Throat  dextrose 40% Gel 15 Gram(s) Oral once PRN Blood Glucose LESS THAN 70 milliGRAM(s)/deciliter  glucagon  Injectable 1 milliGRAM(s) IntraMuscular once PRN Glucose LESS THAN 70 milligrams/deciliter  HYDROmorphone  Injectable 0.5 milliGRAM(s) IV Push every 4 hours PRN breakthrough  ondansetron Injectable 4 milliGRAM(s) IV Push every 6 hours PRN Nausea  oxyCODONE    IR 5 milliGRAM(s) Oral every 4 hours PRN Moderate Pain (4 - 6)  oxyCODONE    IR 10 milliGRAM(s) Oral every 6 hours PRN Severe Pain (7 - 10)      Drug Dosing Weight  Height (cm): 154.9 (05 Oct 2020 16:17)  Weight (kg): 72.7 (05 Oct 2020 21:05)  BMI (kg/m2): 30.3 (05 Oct 2020 21:05)  BSA (m2): 1.72 (05 Oct 2020 21:05)    PAST MEDICAL & SURGICAL HISTORY:  Degenerative disc disease, lumbar    Cataracts, bilateral    Rheumatoid arthritis    DM type 2 (diabetes mellitus, type 2)    S/P carpal tunnel release    H/O shoulder surgery    History of bilateral oophorectomies    History of appendectomy    History of cholecystectomy        FAMILY HISTORY:  No pertinent family history in first degree relatives of cardiac disease    SOCIAL HISTORY:  no smoking, no EtOH use, no drug use    ADVANCE DIRECTIVES:    Vital Signs Last 24 Hrs  T(C): 36.7 (09 Oct 2020 10:11), Max: 37.1 (08 Oct 2020 17:11)  T(F): 98 (09 Oct 2020 10:11), Max: 98.7 (08 Oct 2020 17:11)  HR: 89 (09 Oct 2020 10:11) (77 - 89)  BP: 134/61 (09 Oct 2020 12:30) (131/60 - 162/67)  BP(mean): --  ABP: --  ABP(mean): --  RR: 15 (09 Oct 2020 10:11) (15 - 20)  SpO2: 95% (09 Oct 2020 10:11) (94% - 97%)          I&O's Detail    08 Oct 2020 07:01  -  09 Oct 2020 07:00  --------------------------------------------------------  IN:    dextrose 5% + sodium chloride 0.45%: 840 mL    IV PiggyBack: 500 mL    IV PiggyBack: 200 mL    Lactated Ringers: 600 mL    Oral Fluid: 360 mL  Total IN: 2500 mL    OUT:    VAC (Vacuum Assisted Closure) System (mL): 0 mL    Voided (mL): 2050 mL  Total OUT: 2050 mL    Total NET: 450 mL      09 Oct 2020 07:01  -  09 Oct 2020 15:51  --------------------------------------------------------  IN:    dextrose 5% + sodium chloride 0.45%: 300 mL    IV PiggyBack: 100 mL  Total IN: 400 mL    OUT:  Total OUT: 0 mL    Total NET: 400 mL          PHYSICAL EXAM:      Constitutional: NAD  Eyes: PERRLA  ENMT: MMM  Neck: supple  Back: midline  Respiratory: CTA b/l  Cardiovascular: rrr, s1s2, no m/r/g  Gastrointestinal: soft, NTND, + BS, incisions CDI  Extremities: wwp  Vascular: + 2 pulses radial  Neurological: AAO x 4  Skin: no rash  Lymph Nodes: no LAD  Musculoskeletal: no joint swelling  Psychiatric: normal affect      LABS:  CBC Full  -  ( 09 Oct 2020 06:44 )  WBC Count : 13.25 K/uL  RBC Count : 2.89 M/uL  Hemoglobin : 8.6 g/dL  Hematocrit : 26.2 %  Platelet Count - Automated : 335 K/uL  Mean Cell Volume : 90.7 fl  Mean Cell Hemoglobin : 29.8 pg  Mean Cell Hemoglobin Concentration : 32.8 gm/dL  Auto Neutrophil # : x  Auto Lymphocyte # : x  Auto Monocyte # : x  Auto Eosinophil # : x  Auto Basophil # : x  Auto Neutrophil % : x  Auto Lymphocyte % : x  Auto Monocyte % : x  Auto Eosinophil % : x  Auto Basophil % : x    10-09    137  |  100  |  7   ----------------------------<  155<H>  3.4<L>   |  24  |  0.56    Ca    8.0<L>      09 Oct 2020 06:44  Phos  1.9     10-09  Mg     1.9     10-09      CAPILLARY BLOOD GLUCOSE      POCT Blood Glucose.: 185 mg/dL (09 Oct 2020 12:33)          EKG:    ECHO, US:  < from: US Duplex Venous Lower Ext Complete, Bilateral (10.07.20 @ 19:15) >  IMPRESSION:  No vein thrombosis seen.    < end of copied text >      RADIOLOGY:  < from: CT Abdomen and Pelvis w/ IV Cont (10.05.20 @ 14:02) >  Impression: Dilated small bowel loops with more than one transition point, consistent with closed loop small bowel obstruction, likely due to adhesions. Hypoenhancing small bowel walls with moderate ascites and mesenteric edema, consistent with bowel ischemia.    < end of copied text >

## 2020-10-09 NOTE — PROGRESS NOTE ADULT - SUBJECTIVE AND OBJECTIVE BOX
INTERVAL HPI/OVERNIGHT EVENTS:   SURGERY ATTENDING    STATUS POST:      Exploratory Laparotomy, Lysis of adhesions, Reduction of incarcerated strangulated internal hernia, Small bowel resection 120 cm of ileum with side to side functional end to end anastomosis, Abdominal washout, Drainage, Closure.        POST OPERATIVE DAY #: 4    SUBJECTIVE:  Flatus: [x ] YES [ ] NO             Bowel Movement: [x ] YES [ ] NO  Pain (0-10):         2   Pain Control Adequate: [x ] YES [ ] NO  Nausea: [ ] YES [x ] NO            Vomiting: [ ] YES [x ] NO  Diarrhea: [ ] YES [x ] NO         Constipation: [ ] YES [x ] NO     Chest Pain: [ ] YES [x ] NO    SOB:  [ ] YES [x ] NO    MEDICATIONS  (STANDING):  cefTRIAXone   IVPB 2000 milliGRAM(s) IV Intermittent every 24 hours  dextrose 5%. 1000 milliLiter(s) (50 mL/Hr) IV Continuous <Continuous>  dextrose 50% Injectable 12.5 Gram(s) IV Push once  dextrose 50% Injectable 25 Gram(s) IV Push once  dextrose 50% Injectable 25 Gram(s) IV Push once  fluconAZOLE IVPB 400 milliGRAM(s) IV Intermittent every 24 hours  heparin   Injectable 5000 Unit(s) SubCutaneous every 8 hours  insulin lispro (HumaLOG) corrective regimen sliding scale   SubCutaneous Before meals and at bedtime  levothyroxine 137 MICROGram(s) Oral at bedtime  metroNIDAZOLE  IVPB 500 milliGRAM(s) IV Intermittent every 8 hours  pantoprazole  Injectable 40 milliGRAM(s) IV Push daily  potassium phosphate / sodium phosphate Powder (PHOS-NaK) 2 Packet(s) Oral once    MEDICATIONS  (PRN):  acetaminophen   Tablet .. 650 milliGRAM(s) Oral every 6 hours PRN Mild Pain (1 - 3)  benzocaine 15 mG/menthol 3.6 mG (Sugar-Free) Lozenge 1 Lozenge Oral two times a day PRN Sore Throat  dextrose 40% Gel 15 Gram(s) Oral once PRN Blood Glucose LESS THAN 70 milliGRAM(s)/deciliter  glucagon  Injectable 1 milliGRAM(s) IntraMuscular once PRN Glucose LESS THAN 70 milligrams/deciliter  HYDROmorphone  Injectable 0.5 milliGRAM(s) IV Push every 4 hours PRN breakthrough  ondansetron Injectable 4 milliGRAM(s) IV Push every 6 hours PRN Nausea  oxyCODONE    IR 5 milliGRAM(s) Oral every 4 hours PRN Moderate Pain (4 - 6)  oxyCODONE    IR 10 milliGRAM(s) Oral every 6 hours PRN Severe Pain (7 - 10)      Vital Signs Last 24 Hrs  T(C): 37.3 (09 Oct 2020 16:06), Max: 37.3 (09 Oct 2020 16:06)  T(F): 99.1 (09 Oct 2020 16:06), Max: 99.1 (09 Oct 2020 16:06)  HR: 77 (09 Oct 2020 16:06) (77 - 89)  BP: 156/63 (09 Oct 2020 16:06) (131/60 - 162/67)  BP(mean): --  RR: 19 (09 Oct 2020 16:06) (15 - 19)  SpO2: 95% (09 Oct 2020 16:06) (95% - 97%)    PHYSICAL EXAM:      Constitutional:    Eyes:    ENMT:    Neck:    Breasts:    Back:    Respiratory:    Cardiovascular:    Gastrointestinal:    Genitourinary:    Rectal:    Extremities:    Vascular:    Neurological:    Skin:    Lymph Nodes:    Musculoskeletal:    Psychiatric:        I&O's Detail    08 Oct 2020 07:01  -  09 Oct 2020 07:00  --------------------------------------------------------  IN:    dextrose 5% + sodium chloride 0.45%: 840 mL    IV PiggyBack: 500 mL    IV PiggyBack: 200 mL    Lactated Ringers: 600 mL    Oral Fluid: 360 mL  Total IN: 2500 mL    OUT:    VAC (Vacuum Assisted Closure) System (mL): 0 mL    Voided (mL): 2050 mL  Total OUT: 2050 mL    Total NET: 450 mL      09 Oct 2020 07:01  -  09 Oct 2020 17:35  --------------------------------------------------------  IN:    dextrose 5% + sodium chloride 0.45%: 300 mL    IV PiggyBack: 100 mL    IV PiggyBack: 50 mL  Total IN: 450 mL    OUT:    Voided (mL): 600 mL  Total OUT: 600 mL    Total NET: -150 mL          LABS:                        8.6    13.25 )-----------( 335      ( 09 Oct 2020 06:44 )             26.2     10-09    137  |  100  |  7   ----------------------------<  155<H>  3.4<L>   |  24  |  0.56    Ca    8.0<L>      09 Oct 2020 06:44  Phos  1.9     10-09  Mg     1.9     10-09            RADIOLOGY & ADDITIONAL STUDIES:

## 2020-10-09 NOTE — PROGRESS NOTE ADULT - ASSESSMENT
75F PMHx RA, DM, hypothyroidism, cataracts, ?neuromuscular disease, PSHx lap willem, lap appy, b/l oophorectomy for ovarian cysts a/w SBO, with bowel ischemia, now s/p exploratory laparotomy, SBR and primary anastomosis (10/05/2020). Pt having bowel function, tolerating CLD.    CLD on maintenance fluids  f/u AM labs  Pain/nausea control  ISS FS improved overnight. synthroid  ID: zosyn (10/5-), fluconazole (10/5-), ID Dr. Ayala consulted  SCDs, SQH. protonix  Prevena vac (incisional)  PT/OT: ordered 10/6 oob to chair, OT: rec [facility]

## 2020-10-09 NOTE — CONSULT NOTE ADULT - PROBLEM SELECTOR RECOMMENDATION 9
Management as per primary team  -Pain control  -CLD  -PT - currently rec [facility]  -OOB  -CTX/Flagyl/Fluconazole Outpatient management

## 2020-10-09 NOTE — PROGRESS NOTE ADULT - SUBJECTIVE AND OBJECTIVE BOX
STATUS POST:  POD 4 from ex lap, reduction of incarcerated SB, FRANCISCO, SBR, primary anastamosis     SUBJECTIVE: Patient seen and examined bedside by chief resident. ON: IGNACIO, AVSS, UOP adequate, tolerating CLD, w/o n/v, passing flatus and having BMs. This AM, reports improved arm strength, still has difficulty getting out of bed. Reports that she has undergone deconditioning w/ decreased ambulation since COVID.    fluconAZOLE IVPB 400 milliGRAM(s) IV Intermittent every 24 hours  heparin   Injectable 5000 Unit(s) SubCutaneous every 8 hours  piperacillin/tazobactam IVPB.. 3.375 Gram(s) IV Intermittent every 6 hours      Vital Signs Last 24 Hrs  T(C): 36.6 (09 Oct 2020 07:05), Max: 37.1 (08 Oct 2020 17:11)  T(F): 97.8 (09 Oct 2020 07:05), Max: 98.7 (08 Oct 2020 17:11)  HR: 77 (09 Oct 2020 07:05) (70 - 84)  BP: 131/60 (09 Oct 2020 07:05) (106/76 - 151/63)  BP(mean): --  RR: 16 (09 Oct 2020 07:05) (15 - 20)  SpO2: 96% (09 Oct 2020 07:05) (94% - 97%)  I&O's Detail    08 Oct 2020 07:01  -  09 Oct 2020 07:00  --------------------------------------------------------  IN:    dextrose 5% + sodium chloride 0.45%: 180 mL    IV PiggyBack: 500 mL    IV PiggyBack: 200 mL    Lactated Ringers: 600 mL    Oral Fluid: 360 mL  Total IN: 1840 mL    OUT:    Voided (mL): 2050 mL  Total OUT: 2050 mL    Total NET: -210 mL        Physical Exam:  General: No acute distress, resting comfortably in bed  C/V: normal sinus rhythm  Pulm: Nonlabored breathing, no respiratory distress  Abd: soft, non-tender, non-distended, incisions clean/dry/intact.  Extrem: SCDs in place    LABS:                        8.6    13.25 )-----------( 335      ( 09 Oct 2020 06:44 )             26.2     10-09    137  |  100  |  7   ----------------------------<  155<H>  3.4<L>   |  24  |  0.56    Ca    8.0<L>      09 Oct 2020 06:44  Phos  1.9     10-09  Mg     1.9     10-09            RADIOLOGY & ADDITIONAL STUDIES:

## 2020-10-09 NOTE — CONSULT NOTE ADULT - ASSESSMENT
75F PMHx RA, DM, hypothyroidism, cataracts, ?neuromuscular disease, PSHx lap willem, lap appy, b/l oophorectomy for ovarian cysts a/w SBO, questionable bowel ischemia, POD#1 s/p exploratory laparotomy, SBR and primary anastomosis  
75F PMHx RA, DM, hypothyroidism, cataracts, ?neuromuscular disease, PSHx lap willem, lap appy, b/l oophorectomy for ovarian cysts presents with abd pain x 1 day. Reports sudden onset generalized abd pain found to have SBO, with bowel ischemia, now s/p exploratory laparotomy, SBR and primary anastomosis (10/05/2020) now w/ resumption of bowel function, tolerating CLD.
A/P: 75F PMHx RA, DM, hypothyroidism, cataracts, ?neuromuscular disease, PSHx lap willem, lap appy, b/l oophorectomy for ovarian cysts a/w SBO, questionable bowel ischemia, now s/p exploratory laparotomy, SBR and primary anastomosis in stable condition    NEURO: propofol, fentanyl  CV: stable  PULM: AC  GI/FEN: NPO  : alfredo  ENDO: ISS, synthroid  ID: zosyn (10/5-), fluconazole (10/5-)  PPX: SCDs, SQH. protonix  LINES: PIVs x2  WOUNDS/DRAINS: Prevena vac
A/P: 75F PMHx RA, DM, hypothyroidism, cataracts, ?neuromuscular disease, PSHx lap willem, lap appy, b/l oophorectomy for ovarian cysts a/w SBO, questionable bowel ischemia.     NEURO:  CV:   PULM:   GI/FEN: NPO, NGT  : alfredo.   ENDO: ISS  ID:  PPX: SCDs, SQH.    LINES: PIVs,   WOUNDS/DRAINS:

## 2020-10-09 NOTE — PROGRESS NOTE ADULT - ATTENDING COMMENTS
Abdomen distended, soft, BS+,  Flatus+, Bm+, VAC in place, working well, tolerating diet,  IVF, IV ABX, DVT prophylaxis, F/U LABS, VS, Discharge planing to REHAB

## 2020-10-09 NOTE — PROGRESS NOTE ADULT - SUBJECTIVE AND OBJECTIVE BOX
INTERVAL HPI/OVERNIGHT EVENTS: Afebrile , (+) BM, improving    CONSTITUTIONAL:  Negative fever or chills, feels well, good appetite  EYES:  Negative  blurry vision or double vision  CARDIOVASCULAR:  Negative for chest pain or palpitations  RESPIRATORY:  Negative for cough, wheezing, or SOB   GASTROINTESTINAL:  Negative for nausea, vomiting, diarrhea  GENITOURINARY:  Negative frequency, urgency or dysuria  NEUROLOGIC:  No headache, confusion, dizziness, lightheadedness      ANTIBIOTICS/RELEVANT:    MEDICATIONS  (STANDING):  cefTRIAXone   IVPB 2000 milliGRAM(s) IV Intermittent every 24 hours  dextrose 5%. 1000 milliLiter(s) (50 mL/Hr) IV Continuous <Continuous>  dextrose 50% Injectable 12.5 Gram(s) IV Push once  dextrose 50% Injectable 25 Gram(s) IV Push once  dextrose 50% Injectable 25 Gram(s) IV Push once  fluconAZOLE IVPB 400 milliGRAM(s) IV Intermittent every 24 hours  heparin   Injectable 5000 Unit(s) SubCutaneous every 8 hours  insulin lispro (HumaLOG) corrective regimen sliding scale   SubCutaneous Before meals and at bedtime  levothyroxine 137 MICROGram(s) Oral at bedtime  metroNIDAZOLE  IVPB 500 milliGRAM(s) IV Intermittent every 8 hours  pantoprazole  Injectable 40 milliGRAM(s) IV Push daily    MEDICATIONS  (PRN):  acetaminophen   Tablet .. 650 milliGRAM(s) Oral every 6 hours PRN Mild Pain (1 - 3)  benzocaine 15 mG/menthol 3.6 mG (Sugar-Free) Lozenge 1 Lozenge Oral two times a day PRN Sore Throat  dextrose 40% Gel 15 Gram(s) Oral once PRN Blood Glucose LESS THAN 70 milliGRAM(s)/deciliter  glucagon  Injectable 1 milliGRAM(s) IntraMuscular once PRN Glucose LESS THAN 70 milligrams/deciliter  HYDROmorphone  Injectable 0.5 milliGRAM(s) IV Push every 4 hours PRN breakthrough  ondansetron Injectable 4 milliGRAM(s) IV Push every 6 hours PRN Nausea  oxyCODONE    IR 5 milliGRAM(s) Oral every 4 hours PRN Moderate Pain (4 - 6)  oxyCODONE    IR 10 milliGRAM(s) Oral every 6 hours PRN Severe Pain (7 - 10)        Vital Signs Last 24 Hrs  T(C): 37.3 (09 Oct 2020 16:06), Max: 37.3 (09 Oct 2020 16:06)  T(F): 99.1 (09 Oct 2020 16:06), Max: 99.1 (09 Oct 2020 16:06)  HR: 77 (09 Oct 2020 16:06) (77 - 89)  BP: 156/63 (09 Oct 2020 16:06) (131/60 - 162/67)  BP(mean): --  RR: 19 (09 Oct 2020 16:06) (15 - 19)  SpO2: 95% (09 Oct 2020 16:06) (95% - 97%)    10-08-20 @ 07:01  -  10-09-20 @ 07:00  --------------------------------------------------------  IN: 2500 mL / OUT: 2050 mL / NET: 450 mL    10-09-20 @ 07:01  -  10-09-20 @ 18:39  --------------------------------------------------------  IN: 450 mL / OUT: 800 mL / NET: -350 mL      PHYSICAL EXAM:  Constitutional:  Well-developed, well nourished  Eyes: TYSON, EOMI  Ear/Nose/Throat: no oral lesion, no sinus tenderness on percussion	  Neck:no JVD, no lymphadenopathy, supple  Respiratory: CTA sridhar  Cardiovascular: S1S2 RRR, no murmurs  Gastrointestinal: soft, (+) BS, no HSM  Extremities: (+) DJD  Vascular: DP Pulse: right normal; left normal      LABS:                        8.6    13.25 )-----------( 335      ( 09 Oct 2020 06:44 )             26.2     10-09    137  |  100  |  7   ----------------------------<  155<H>  3.4<L>   |  24  |  0.56    Ca    8.0<L>      09 Oct 2020 06:44  Phos  1.9     10-09  Mg     1.9     10-09      MICROBIOLOGY:  Culture - Body Fluid with Gram Stain (10.06.20 @ 01:41)    Gram Stain:   Few White blood cells  No organisms seen    Specimen Source: .Body Fluid Intraabdominal Fluid #2    Culture Results:   No growth to date    Culture - Body Fluid with Gram Stain (10.06.20 @ 01:41)    Gram Stain:   Rare White blood cells  Rare Gram Positive Cocci    Specimen Source: .Body Fluid Intraabdominal Fluid #1    Culture Results:   No growth to date        RADIOLOGY & ADDITIONAL STUDIES:

## 2020-10-09 NOTE — PROGRESS NOTE ADULT - ASSESSMENT
75F PMHx RA, DM, hypothyroidism, cataracts, ?neuromuscular disease, PSHx lap willem, lap appy, b/l oophorectomy for ovarian cysts a/w SBO, questionable bowel ischemia, POD#4 s/p exploratory laparotomy, SBR and primary anastomosis

## 2020-10-10 LAB
CULTURE RESULTS: SIGNIFICANT CHANGE UP
CULTURE RESULTS: SIGNIFICANT CHANGE UP
SPECIMEN SOURCE: SIGNIFICANT CHANGE UP
SPECIMEN SOURCE: SIGNIFICANT CHANGE UP

## 2020-10-10 PROCEDURE — 99233 SBSQ HOSP IP/OBS HIGH 50: CPT | Mod: GC

## 2020-10-10 RX ORDER — POTASSIUM PHOSPHATE, MONOBASIC POTASSIUM PHOSPHATE, DIBASIC 236; 224 MG/ML; MG/ML
15 INJECTION, SOLUTION INTRAVENOUS ONCE
Refills: 0 | Status: COMPLETED | OUTPATIENT
Start: 2020-10-10 | End: 2020-10-10

## 2020-10-10 RX ADMIN — FLUCONAZOLE 100 MILLIGRAM(S): 150 TABLET ORAL at 20:29

## 2020-10-10 RX ADMIN — OXYCODONE HYDROCHLORIDE 10 MILLIGRAM(S): 5 TABLET ORAL at 18:01

## 2020-10-10 RX ADMIN — CEFTRIAXONE 100 MILLIGRAM(S): 500 INJECTION, POWDER, FOR SOLUTION INTRAMUSCULAR; INTRAVENOUS at 18:04

## 2020-10-10 RX ADMIN — POTASSIUM PHOSPHATE, MONOBASIC POTASSIUM PHOSPHATE, DIBASIC 62.5 MILLIMOLE(S): 236; 224 INJECTION, SOLUTION INTRAVENOUS at 11:49

## 2020-10-10 RX ADMIN — Medication 2: at 22:17

## 2020-10-10 RX ADMIN — Medication 100 MILLIGRAM(S): at 07:51

## 2020-10-10 RX ADMIN — OXYCODONE HYDROCHLORIDE 10 MILLIGRAM(S): 5 TABLET ORAL at 07:51

## 2020-10-10 RX ADMIN — Medication 100 MICROGRAM(S): at 05:44

## 2020-10-10 RX ADMIN — HEPARIN SODIUM 5000 UNIT(S): 5000 INJECTION INTRAVENOUS; SUBCUTANEOUS at 17:58

## 2020-10-10 RX ADMIN — OXYCODONE HYDROCHLORIDE 10 MILLIGRAM(S): 5 TABLET ORAL at 08:25

## 2020-10-10 RX ADMIN — PANTOPRAZOLE SODIUM 40 MILLIGRAM(S): 20 TABLET, DELAYED RELEASE ORAL at 15:15

## 2020-10-10 RX ADMIN — HEPARIN SODIUM 5000 UNIT(S): 5000 INJECTION INTRAVENOUS; SUBCUTANEOUS at 09:38

## 2020-10-10 RX ADMIN — INFLUENZA VIRUS VACCINE 0.7 MILLILITER(S): 15; 15; 15; 15 SUSPENSION INTRAMUSCULAR at 05:44

## 2020-10-10 RX ADMIN — HEPARIN SODIUM 5000 UNIT(S): 5000 INJECTION INTRAVENOUS; SUBCUTANEOUS at 00:15

## 2020-10-10 RX ADMIN — Medication 100 MILLIGRAM(S): at 15:15

## 2020-10-10 RX ADMIN — OXYCODONE HYDROCHLORIDE 10 MILLIGRAM(S): 5 TABLET ORAL at 18:37

## 2020-10-10 RX ADMIN — Medication 2: at 17:58

## 2020-10-10 NOTE — PROGRESS NOTE ADULT - ASSESSMENT
75F PMHx RA, DM, hypothyroidism, cataracts, ?neuromuscular disease, PSHx lap willem, lap appy, b/l oophorectomy for ovarian cysts presents with SBO, with bowel ischemia, now s/p exploratory laparotomy, SBR and primary anastomosis (10/05/2020) now w/ resumption of bowel function, tolerating CLD.    SBO with ischemia  s/p SBR and primary anastomosis (10/05/2020), with wound vac  On abx- ceftriaxone, flagyl, fluconazole per ID, cultures small (+) Gram positive cocci.    Hypothyroidism  C/w synthroid    Awaiting [facility] placement- discussed with SW about NH process and patient selection

## 2020-10-10 NOTE — PROGRESS NOTE ADULT - SUBJECTIVE AND OBJECTIVE BOX
Pt seen and examined at bedside.  Reports tolerating diet  BM yesterday    Allergies    gabapentin (Rash)  NSAIDs (Unknown)  penicillin (Unknown)  tramadol (Hives)    Intolerances      MEDICATIONS:  MEDICATIONS  (STANDING):  cefTRIAXone   IVPB 2000 milliGRAM(s) IV Intermittent every 24 hours  dextrose 5%. 1000 milliLiter(s) (50 mL/Hr) IV Continuous <Continuous>  dextrose 50% Injectable 12.5 Gram(s) IV Push once  dextrose 50% Injectable 25 Gram(s) IV Push once  dextrose 50% Injectable 25 Gram(s) IV Push once  fluconAZOLE IVPB 400 milliGRAM(s) IV Intermittent every 24 hours  heparin   Injectable 5000 Unit(s) SubCutaneous every 8 hours  insulin lispro (HumaLOG) corrective regimen sliding scale   SubCutaneous Before meals and at bedtime  levothyroxine 100 MICROGram(s) Oral daily  metroNIDAZOLE  IVPB 500 milliGRAM(s) IV Intermittent every 8 hours  pantoprazole  Injectable 40 milliGRAM(s) IV Push daily    MEDICATIONS  (PRN):  acetaminophen   Tablet .. 650 milliGRAM(s) Oral every 6 hours PRN Mild Pain (1 - 3)  benzocaine 15 mG/menthol 3.6 mG (Sugar-Free) Lozenge 1 Lozenge Oral two times a day PRN Sore Throat  dextrose 40% Gel 15 Gram(s) Oral once PRN Blood Glucose LESS THAN 70 milliGRAM(s)/deciliter  glucagon  Injectable 1 milliGRAM(s) IntraMuscular once PRN Glucose LESS THAN 70 milligrams/deciliter  HYDROmorphone  Injectable 0.5 milliGRAM(s) IV Push every 4 hours PRN breakthrough  ondansetron Injectable 4 milliGRAM(s) IV Push every 6 hours PRN Nausea  oxyCODONE    IR 5 milliGRAM(s) Oral every 4 hours PRN Moderate Pain (4 - 6)  oxyCODONE    IR 10 milliGRAM(s) Oral every 6 hours PRN Severe Pain (7 - 10)    Vital Signs Last 24 Hrs  T(C): 36.7 (10 Oct 2020 16:56), Max: 36.8 (10 Oct 2020 09:15)  T(F): 98 (10 Oct 2020 16:56), Max: 98.3 (10 Oct 2020 09:15)  HR: 85 (10 Oct 2020 16:56) (67 - 85)  BP: 160/63 (10 Oct 2020 16:56) (120/59 - 160/63)  BP(mean): --  RR: 18 (10 Oct 2020 16:56) (16 - 18)  SpO2: 95% (10 Oct 2020 16:56) (95% - 99%)    10-09 @ 07:01  -  10-10 @ 07:00  --------------------------------------------------------  IN: 750 mL / OUT: 1300 mL / NET: -550 mL    10-10 @ 07:01  -  10-10 @ 17:17  --------------------------------------------------------  IN: 590 mL / OUT: 550 mL / NET: 40 mL      PHYSICAL EXAM:    General: Well developed; well nourished; in no acute distress  HEENT: MMM, conjunctiva and sclera clear  Neck: Supple  CV: RRR. Normal S1/S2  Respiratory: CTAB. No respiratory distress. No intercostal retractions  Gastrointestinal: Soft non-tender, mid line wound vac in place  Extremities: No clubbing, cyanosis, or edema  Skin: Warm and dry.   Neuro: A&O x 3.  Psych: Normal affect and mood    LABS:                        9.2    10.21 )-----------( 392      ( 10 Oct 2020 10:18 )             28.5     10-10    138  |  100  |  10  ----------------------------<  134<H>  3.8   |  22  |  0.52    Ca    8.6      10 Oct 2020 10:18  Phos  2.2     10-10  Mg     2.0     10-10                        RADIOLOGY & ADDITIONAL STUDIES:

## 2020-10-10 NOTE — PROGRESS NOTE ADULT - SUBJECTIVE AND OBJECTIVE BOX
INTERVAL HPI/OVERNIGHT EVENTS:   SURGERY ATTENDING    STATUS POST:      Exploratory Laparotomy, Lysis of adhesions, Reduction of incarcerated strangulated internal hernia, Small bowel resection 120 cm of ileum with side to side functional end to end anastomosis, Abdominal washout, Drainage, Closure.        POST OPERATIVE DAY #: 5    SUBJECTIVE:  Flatus: [x ] YES [ ] NO             Bowel Movement: [x ] YES [ ] NO  Pain (0-10):         2   Pain Control Adequate: [x ] YES [ ] NO  Nausea: [ ] YES [x ] NO            Vomiting: [ ] YES [x ] NO  Diarrhea: [ ] YES [x ] NO         Constipation: [ ] YES [x ] NO     Chest Pain: [ ] YES [x ] NO    SOB:  [ ] YES [x ] NO    MEDICATIONS  (STANDING):  cefTRIAXone   IVPB 2000 milliGRAM(s) IV Intermittent every 24 hours  dextrose 5%. 1000 milliLiter(s) (50 mL/Hr) IV Continuous <Continuous>  dextrose 50% Injectable 12.5 Gram(s) IV Push once  dextrose 50% Injectable 25 Gram(s) IV Push once  dextrose 50% Injectable 25 Gram(s) IV Push once  fluconAZOLE IVPB 400 milliGRAM(s) IV Intermittent every 24 hours  heparin   Injectable 5000 Unit(s) SubCutaneous every 8 hours  insulin lispro (HumaLOG) corrective regimen sliding scale   SubCutaneous Before meals and at bedtime  levothyroxine 100 MICROGram(s) Oral daily  metroNIDAZOLE  IVPB 500 milliGRAM(s) IV Intermittent every 8 hours  pantoprazole  Injectable 40 milliGRAM(s) IV Push daily    MEDICATIONS  (PRN):  acetaminophen   Tablet .. 650 milliGRAM(s) Oral every 6 hours PRN Mild Pain (1 - 3)  benzocaine 15 mG/menthol 3.6 mG (Sugar-Free) Lozenge 1 Lozenge Oral two times a day PRN Sore Throat  dextrose 40% Gel 15 Gram(s) Oral once PRN Blood Glucose LESS THAN 70 milliGRAM(s)/deciliter  glucagon  Injectable 1 milliGRAM(s) IntraMuscular once PRN Glucose LESS THAN 70 milligrams/deciliter  HYDROmorphone  Injectable 0.5 milliGRAM(s) IV Push every 4 hours PRN breakthrough  ondansetron Injectable 4 milliGRAM(s) IV Push every 6 hours PRN Nausea  oxyCODONE    IR 5 milliGRAM(s) Oral every 4 hours PRN Moderate Pain (4 - 6)  oxyCODONE    IR 10 milliGRAM(s) Oral every 6 hours PRN Severe Pain (7 - 10)      Vital Signs Last 24 Hrs  T(C): 36.8 (10 Oct 2020 09:15), Max: 37.3 (09 Oct 2020 16:06)  T(F): 98.3 (10 Oct 2020 09:15), Max: 99.1 (09 Oct 2020 16:06)  HR: 71 (10 Oct 2020 09:15) (67 - 77)  BP: 134/60 (10 Oct 2020 09:15) (120/59 - 162/67)  BP(mean): --  RR: 18 (10 Oct 2020 09:15) (16 - 19)  SpO2: 99% (10 Oct 2020 09:15) (95% - 99%)    PHYSICAL EXAM:      Constitutional:    Eyes:    ENMT:    Neck:    Breasts:    Back:    Respiratory:    Cardiovascular:    Gastrointestinal:    Genitourinary:    Rectal:    Extremities:    Vascular:    Neurological:    Skin:    Lymph Nodes:    Musculoskeletal:    Psychiatric:        I&O's Detail    09 Oct 2020 07:01  -  10 Oct 2020 07:00  --------------------------------------------------------  IN:    dextrose 5% + sodium chloride 0.45%: 300 mL    IV PiggyBack: 100 mL    IV PiggyBack: 50 mL    IV PiggyBack: 300 mL  Total IN: 750 mL    OUT:    VAC (Vacuum Assisted Closure) System (mL): 0 mL    Voided (mL): 1300 mL  Total OUT: 1300 mL    Total NET: -550 mL          LABS:                        8.6    13.25 )-----------( 335      ( 09 Oct 2020 06:44 )             26.2     10-09    137  |  100  |  7   ----------------------------<  155<H>  3.4<L>   |  24  |  0.56    Ca    8.0<L>      09 Oct 2020 06:44  Phos  1.9     10-09  Mg     1.9     10-09            RADIOLOGY & ADDITIONAL STUDIES:

## 2020-10-10 NOTE — PROGRESS NOTE ADULT - SUBJECTIVE AND OBJECTIVE BOX
INTERVAL HPI/OVERNIGHT EVENTS: Patient is improving, (+) BM, tolerates diet    CONSTITUTIONAL:  Negative fever or chills, feels well, fair appetite  EYES:  Negative  blurry vision or double vision  CARDIOVASCULAR:  Negative for chest pain or palpitations  RESPIRATORY:  Negative for cough, wheezing, or SOB   GASTROINTESTINAL:  Negative for nausea, vomiting, diarrhea, constipation, or abdominal pain  GENITOURINARY:  Negative frequency, urgency or dysuria  NEUROLOGIC:  No headache, confusion, dizziness, lightheadedness      ANTIBIOTICS/RELEVANT:    MEDICATIONS  (STANDING):  cefTRIAXone   IVPB 2000 milliGRAM(s) IV Intermittent every 24 hours  dextrose 5%. 1000 milliLiter(s) (50 mL/Hr) IV Continuous <Continuous>  dextrose 50% Injectable 12.5 Gram(s) IV Push once  dextrose 50% Injectable 25 Gram(s) IV Push once  dextrose 50% Injectable 25 Gram(s) IV Push once  fluconAZOLE IVPB 400 milliGRAM(s) IV Intermittent every 24 hours  heparin   Injectable 5000 Unit(s) SubCutaneous every 8 hours  insulin lispro (HumaLOG) corrective regimen sliding scale   SubCutaneous Before meals and at bedtime  levothyroxine 100 MICROGram(s) Oral daily  metroNIDAZOLE  IVPB 500 milliGRAM(s) IV Intermittent every 8 hours  pantoprazole  Injectable 40 milliGRAM(s) IV Push daily    MEDICATIONS  (PRN):  acetaminophen   Tablet .. 650 milliGRAM(s) Oral every 6 hours PRN Mild Pain (1 - 3)  benzocaine 15 mG/menthol 3.6 mG (Sugar-Free) Lozenge 1 Lozenge Oral two times a day PRN Sore Throat  dextrose 40% Gel 15 Gram(s) Oral once PRN Blood Glucose LESS THAN 70 milliGRAM(s)/deciliter  glucagon  Injectable 1 milliGRAM(s) IntraMuscular once PRN Glucose LESS THAN 70 milligrams/deciliter  HYDROmorphone  Injectable 0.5 milliGRAM(s) IV Push every 4 hours PRN breakthrough  ondansetron Injectable 4 milliGRAM(s) IV Push every 6 hours PRN Nausea  oxyCODONE    IR 5 milliGRAM(s) Oral every 4 hours PRN Moderate Pain (4 - 6)  oxyCODONE    IR 10 milliGRAM(s) Oral every 6 hours PRN Severe Pain (7 - 10)        Vital Signs Last 24 Hrs  T(C): 36.7 (10 Oct 2020 16:56), Max: 36.8 (10 Oct 2020 09:15)  T(F): 98 (10 Oct 2020 16:56), Max: 98.3 (10 Oct 2020 09:15)  HR: 85 (10 Oct 2020 16:56) (67 - 85)  BP: 160/63 (10 Oct 2020 16:56) (120/59 - 160/63)  BP(mean): --  RR: 18 (10 Oct 2020 16:56) (16 - 18)  SpO2: 95% (10 Oct 2020 16:56) (95% - 99%)    10-09-20 @ 07:01  -  10-10-20 @ 07:00  --------------------------------------------------------  IN: 750 mL / OUT: 1300 mL / NET: -550 mL    10-10-20 @ 07:01  -  10-10-20 @ 20:42  --------------------------------------------------------  IN: 1250 mL / OUT: 550 mL / NET: 700 mL      PHYSICAL EXAM:  Constitutional: Well-developed, well nourished  Eyes: TYSON, EOMI  Ear/Nose/Throat: no oral lesion, no sinus tenderness on percussion	  Neck: no JVD, no lymphadenopathy, supple  Respiratory: CTA sridhar  Cardiovascular: S1S2 RRR, no murmurs  Gastrointestinal: soft, (+) BS, no HSM  Extremities: no e/e/c  Vascular: DP Pulse: right normal; left normal      LABS:                        9.2    10.21 )-----------( 392      ( 10 Oct 2020 10:18 )             28.5     10-10    138  |  100  |  10  ----------------------------<  134<H>  3.8   |  22  |  0.52    Ca    8.6      10 Oct 2020 10:18  Phos  2.2     10-10  Mg     2.0     10-10    MICROBIOLOGY:  Culture - Body Fluid with Gram Stain (10.06.20 @ 01:41)    Gram Stain:   Rare White blood cells  Rare Gram Positive Cocci    Specimen Source: .Body Fluid Intraabdominal Fluid #1    Culture Results:   No growth to date        RADIOLOGY & ADDITIONAL STUDIES:

## 2020-10-10 NOTE — PROGRESS NOTE ADULT - ATTENDING COMMENTS
Abdomen distended, soft, BS+,  Flatus+, Bm+, VAC in place, working well, tolerating diet,  IVF, IV ABX, DVT prophylaxis, F/U LABS, VS, Discharge planing to REHAB.

## 2020-10-10 NOTE — PROGRESS NOTE ADULT - ASSESSMENT
75F PMHx RA, DM, hypothyroidism, cataracts, ?neuromuscular disease, PSHx lap willem, lap appy, b/l oophorectomy for ovarian cysts a/w SBO, with bowel ischemia, now s/p exploratory laparotomy, SBR and primary anastomosis (10/05/2020).    Soft diet  AM labs  Pain/nausea control  ISS FS improved overnight. synthroid  ID: zosyn (10/5-), fluconazole (10/5-), ID Dr. Ayala consulted  SCDs, SQH. protonix  Prevena vac (incisional)

## 2020-10-10 NOTE — PROGRESS NOTE ADULT - SUBJECTIVE AND OBJECTIVE BOX
POST-OP DAY: 5 s/p ex lap, reduction of incarcerated SB, FRANCISCO, SBR, primary anastomosis      SUBJECTIVE: Patient seen and examined bedside by chief resident. Rob soft diet, denies N/V. + BF.     cefTRIAXone   IVPB 2000 milliGRAM(s) IV Intermittent every 24 hours  fluconAZOLE IVPB 400 milliGRAM(s) IV Intermittent every 24 hours  heparin   Injectable 5000 Unit(s) SubCutaneous every 8 hours  metroNIDAZOLE  IVPB 500 milliGRAM(s) IV Intermittent every 8 hours    MEDICATIONS  (PRN):  acetaminophen   Tablet .. 650 milliGRAM(s) Oral every 6 hours PRN Mild Pain (1 - 3)  benzocaine 15 mG/menthol 3.6 mG (Sugar-Free) Lozenge 1 Lozenge Oral two times a day PRN Sore Throat  dextrose 40% Gel 15 Gram(s) Oral once PRN Blood Glucose LESS THAN 70 milliGRAM(s)/deciliter  glucagon  Injectable 1 milliGRAM(s) IntraMuscular once PRN Glucose LESS THAN 70 milligrams/deciliter  HYDROmorphone  Injectable 0.5 milliGRAM(s) IV Push every 4 hours PRN breakthrough  ondansetron Injectable 4 milliGRAM(s) IV Push every 6 hours PRN Nausea  oxyCODONE    IR 5 milliGRAM(s) Oral every 4 hours PRN Moderate Pain (4 - 6)  oxyCODONE    IR 10 milliGRAM(s) Oral every 6 hours PRN Severe Pain (7 - 10)      I&O's Detail    09 Oct 2020 07:01  -  10 Oct 2020 07:00  --------------------------------------------------------  IN:    dextrose 5% + sodium chloride 0.45%: 300 mL    IV PiggyBack: 100 mL    IV PiggyBack: 50 mL    IV PiggyBack: 300 mL  Total IN: 750 mL    OUT:    VAC (Vacuum Assisted Closure) System (mL): 0 mL    Voided (mL): 1300 mL  Total OUT: 1300 mL  Total NET: -550 mL    Vital Signs Last 24 Hrs  T(C): 36.8 (10 Oct 2020 09:15), Max: 37.3 (09 Oct 2020 16:06)  T(F): 98.3 (10 Oct 2020 09:15), Max: 99.1 (09 Oct 2020 16:06)  HR: 71 (10 Oct 2020 09:15) (67 - 77)  BP: 134/60 (10 Oct 2020 09:15) (120/59 - 156/63)  BP(mean): --  RR: 18 (10 Oct 2020 09:15) (16 - 19)  SpO2: 99% (10 Oct 2020 09:15) (95% - 99%)    General: NAD, resting comfortably in bed  C/V: NSR  Pulm: Nonlabored breathing, no respiratory distress  Abd: soft, NT/ND, midline wound vac in place w minimal output  Extrem: WWP, no edema, SCDs in place    LABS:                        9.2    10.21 )-----------( 392      ( 10 Oct 2020 10:18 )             28.5     10-10    138  |  100  |  10  ----------------------------<  134<H>  3.8   |  22  |  0.52    Ca    8.6      10 Oct 2020 10:18  Phos  2.2     10-10  Mg     2.0     10-10            RADIOLOGY & ADDITIONAL STUDIES:

## 2020-10-10 NOTE — PROGRESS NOTE ADULT - ASSESSMENT
75F PMHx RA, DM, hypothyroidism, cataracts, ?neuromuscular disease, PSHx lap willem, lap appy, b/l oophorectomy for ovarian cysts a/w SBO, questionable bowel ischemia, POD#5 s/p exploratory laparotomy, SBR and primary anastomosis

## 2020-10-11 LAB
ANION GAP SERPL CALC-SCNC: 13 MMOL/L — SIGNIFICANT CHANGE UP (ref 5–17)
BUN SERPL-MCNC: 15 MG/DL — SIGNIFICANT CHANGE UP (ref 7–23)
CALCIUM SERPL-MCNC: 8 MG/DL — LOW (ref 8.4–10.5)
CHLORIDE SERPL-SCNC: 103 MMOL/L — SIGNIFICANT CHANGE UP (ref 96–108)
CO2 SERPL-SCNC: 24 MMOL/L — SIGNIFICANT CHANGE UP (ref 22–31)
CREAT SERPL-MCNC: 0.61 MG/DL — SIGNIFICANT CHANGE UP (ref 0.5–1.3)
GLUCOSE SERPL-MCNC: 153 MG/DL — HIGH (ref 70–99)
HCT VFR BLD CALC: 26.6 % — LOW (ref 34.5–45)
HGB BLD-MCNC: 8.7 G/DL — LOW (ref 11.5–15.5)
MAGNESIUM SERPL-MCNC: 2 MG/DL — SIGNIFICANT CHANGE UP (ref 1.6–2.6)
MCHC RBC-ENTMCNC: 29.2 PG — SIGNIFICANT CHANGE UP (ref 27–34)
MCHC RBC-ENTMCNC: 32.7 GM/DL — SIGNIFICANT CHANGE UP (ref 32–36)
MCV RBC AUTO: 89.3 FL — SIGNIFICANT CHANGE UP (ref 80–100)
NRBC # BLD: 0 /100 WBCS — SIGNIFICANT CHANGE UP (ref 0–0)
PHOSPHATE SERPL-MCNC: 2.1 MG/DL — LOW (ref 2.5–4.5)
PLATELET # BLD AUTO: 381 K/UL — SIGNIFICANT CHANGE UP (ref 150–400)
POTASSIUM SERPL-MCNC: 4.1 MMOL/L — SIGNIFICANT CHANGE UP (ref 3.5–5.3)
POTASSIUM SERPL-SCNC: 4.1 MMOL/L — SIGNIFICANT CHANGE UP (ref 3.5–5.3)
RBC # BLD: 2.98 M/UL — LOW (ref 3.8–5.2)
RBC # FLD: 14.3 % — SIGNIFICANT CHANGE UP (ref 10.3–14.5)
SODIUM SERPL-SCNC: 140 MMOL/L — SIGNIFICANT CHANGE UP (ref 135–145)
WBC # BLD: 11.97 K/UL — HIGH (ref 3.8–10.5)
WBC # FLD AUTO: 11.97 K/UL — HIGH (ref 3.8–10.5)

## 2020-10-11 PROCEDURE — 71045 X-RAY EXAM CHEST 1 VIEW: CPT | Mod: 26

## 2020-10-11 RX ADMIN — Medication 62.5 MILLIMOLE(S): at 14:55

## 2020-10-11 RX ADMIN — HEPARIN SODIUM 5000 UNIT(S): 5000 INJECTION INTRAVENOUS; SUBCUTANEOUS at 00:36

## 2020-10-11 RX ADMIN — OXYCODONE HYDROCHLORIDE 10 MILLIGRAM(S): 5 TABLET ORAL at 08:08

## 2020-10-11 RX ADMIN — Medication 100 MILLIGRAM(S): at 07:37

## 2020-10-11 RX ADMIN — FLUCONAZOLE 100 MILLIGRAM(S): 150 TABLET ORAL at 19:53

## 2020-10-11 RX ADMIN — Medication 100 MICROGRAM(S): at 05:23

## 2020-10-11 RX ADMIN — Medication 100 MILLIGRAM(S): at 17:00

## 2020-10-11 RX ADMIN — Medication 100 MILLIGRAM(S): at 00:36

## 2020-10-11 RX ADMIN — HEPARIN SODIUM 5000 UNIT(S): 5000 INJECTION INTRAVENOUS; SUBCUTANEOUS at 17:31

## 2020-10-11 RX ADMIN — OXYCODONE HYDROCHLORIDE 10 MILLIGRAM(S): 5 TABLET ORAL at 16:50

## 2020-10-11 RX ADMIN — PANTOPRAZOLE SODIUM 40 MILLIGRAM(S): 20 TABLET, DELAYED RELEASE ORAL at 12:04

## 2020-10-11 RX ADMIN — CEFTRIAXONE 100 MILLIGRAM(S): 500 INJECTION, POWDER, FOR SOLUTION INTRAMUSCULAR; INTRAVENOUS at 16:00

## 2020-10-11 RX ADMIN — Medication 4: at 17:29

## 2020-10-11 RX ADMIN — Medication 2: at 13:11

## 2020-10-11 RX ADMIN — OXYCODONE HYDROCHLORIDE 10 MILLIGRAM(S): 5 TABLET ORAL at 07:38

## 2020-10-11 RX ADMIN — ONDANSETRON 4 MILLIGRAM(S): 8 TABLET, FILM COATED ORAL at 16:13

## 2020-10-11 RX ADMIN — Medication 2: at 07:53

## 2020-10-11 RX ADMIN — Medication 2: at 22:33

## 2020-10-11 RX ADMIN — HEPARIN SODIUM 5000 UNIT(S): 5000 INJECTION INTRAVENOUS; SUBCUTANEOUS at 09:53

## 2020-10-11 RX ADMIN — OXYCODONE HYDROCHLORIDE 10 MILLIGRAM(S): 5 TABLET ORAL at 16:14

## 2020-10-11 NOTE — PROGRESS NOTE ADULT - SUBJECTIVE AND OBJECTIVE BOX
INTERVAL HPI/OVERNIGHT EVENTS: Afebrile, tolerates oral intake, developed abdominal pain today after ambulating    CONSTITUTIONAL:  Negative fever or chills, feels well, good appetite  EYES:  Negative  blurry vision or double vision  CARDIOVASCULAR:  Negative for chest pain or palpitations  RESPIRATORY:  Negative for cough, wheezing, or SOB   GASTROINTESTINAL:  Negative for nausea, vomiting, transient  abdominal pain  GENITOURINARY:  Negative frequency, urgency or dysuria  NEUROLOGIC:  No headache, confusion, dizziness, lightheadedness      ANTIBIOTICS/RELEVANT:    MEDICATIONS  (STANDING):  cefTRIAXone   IVPB 2000 milliGRAM(s) IV Intermittent every 24 hours  dextrose 5%. 1000 milliLiter(s) (50 mL/Hr) IV Continuous <Continuous>  dextrose 50% Injectable 12.5 Gram(s) IV Push once  dextrose 50% Injectable 25 Gram(s) IV Push once  dextrose 50% Injectable 25 Gram(s) IV Push once  fluconAZOLE IVPB 400 milliGRAM(s) IV Intermittent every 24 hours  heparin   Injectable 5000 Unit(s) SubCutaneous every 8 hours  insulin lispro (HumaLOG) corrective regimen sliding scale   SubCutaneous Before meals and at bedtime  levothyroxine 100 MICROGram(s) Oral daily  metroNIDAZOLE  IVPB 500 milliGRAM(s) IV Intermittent every 8 hours  pantoprazole  Injectable 40 milliGRAM(s) IV Push daily    MEDICATIONS  (PRN):  acetaminophen   Tablet .. 650 milliGRAM(s) Oral every 6 hours PRN Mild Pain (1 - 3)  benzocaine 15 mG/menthol 3.6 mG (Sugar-Free) Lozenge 1 Lozenge Oral two times a day PRN Sore Throat  dextrose 40% Gel 15 Gram(s) Oral once PRN Blood Glucose LESS THAN 70 milliGRAM(s)/deciliter  glucagon  Injectable 1 milliGRAM(s) IntraMuscular once PRN Glucose LESS THAN 70 milligrams/deciliter  HYDROmorphone  Injectable 0.5 milliGRAM(s) IV Push every 4 hours PRN breakthrough  ondansetron Injectable 4 milliGRAM(s) IV Push every 6 hours PRN Nausea  oxyCODONE    IR 5 milliGRAM(s) Oral every 4 hours PRN Moderate Pain (4 - 6)  oxyCODONE    IR 10 milliGRAM(s) Oral every 6 hours PRN Severe Pain (7 - 10)        Vital Signs Last 24 Hrs  T(C): 37.3 (11 Oct 2020 20:32), Max: 37.3 (11 Oct 2020 20:32)  T(F): 99.2 (11 Oct 2020 20:32), Max: 99.2 (11 Oct 2020 20:32)  HR: 87 (11 Oct 2020 20:32) (71 - 87)  BP: 133/63 (11 Oct 2020 20:32) (123/60 - 156/70)  BP(mean): --  RR: 18 (11 Oct 2020 20:32) (14 - 19)  SpO2: 98% (11 Oct 2020 20:32) (94% - 98%)    10-10-20 @ 07:01  -  10-11-20 @ 07:00  --------------------------------------------------------  IN: 1650 mL / OUT: 1150 mL / NET: 500 mL    10-11-20 @ 07:01  -  10-11-20 @ 22:08  --------------------------------------------------------  IN: 1060 mL / OUT: 750 mL / NET: 310 mL      PHYSICAL EXAM:  Constitutional: Well-developed, well nourished  Eyes: TYSON, EOMI  Ear/Nose/Throat: no oral lesion, no sinus tenderness on percussion	  Neck: no JVD, no lymphadenopathy, supple  Respiratory: CTA sridhar  Cardiovascular: S1S2 RRR, no murmurs  Gastrointestinal: soft, (+) BS, no HSM  Extremities: no e/e/c  Vascular: DP Pulse: right normal; left normal      LABS:                        8.7    11.97 )-----------( 381      ( 11 Oct 2020 06:02 )             26.6     10-11    140  |  103  |  15  ----------------------------<  153<H>  4.1   |  24  |  0.61    Ca    8.0<L>      11 Oct 2020 06:02  Phos  2.1     10-11  Mg     2.0     10-11      MICROBIOLOGY:  Culture - Body Fluid with Gram Stain (10.06.20 @ 01:41)    Gram Stain:   Few White blood cells  No organisms seen    Specimen Source: .Body Fluid Intraabdominal Fluid #2    Culture Results:   No growth to date    Culture - Body Fluid with Gram Stain (10.06.20 @ 01:41)    Gram Stain:   Rare White blood cells  Rare Gram Positive Cocci    Specimen Source: .Body Fluid Intraabdominal Fluid #1    Culture Results:   No growth to date        RADIOLOGY & ADDITIONAL STUDIES:

## 2020-10-11 NOTE — PROGRESS NOTE ADULT - ASSESSMENT
75F PMHx RA, DM, hypothyroidism, cataracts, ?neuromuscular disease, PSHx lap willem, lap appy, b/l oophorectomy for ovarian cysts a/w SBO, questionable bowel ischemia, POD#6 s/p exploratory laparotomy, SBR and primary anastomosis

## 2020-10-11 NOTE — PROGRESS NOTE ADULT - ASSESSMENT
75F PMHx RA, DM, hypothyroidism, cataracts, ?neuromuscular disease, PSHx lap willem, lap appy, b/l oophorectomy for ovarian cysts a/w SBO, with bowel ischemia, now s/p exploratory laparotomy, SBR and primary anastomosis (10/05/2020).    Soft diet  plan for d/c tomorrow to [facility]  AM labs  Pain/nausea control  f/u CXR  ISS FS improved overnight. synthroid  ID: zosyn (10/5-), fluconazole (10/5-), ID Dr. Ayala consulted  SCDs, SQH. protonix  Prevena vac (incisional)

## 2020-10-11 NOTE — PROGRESS NOTE ADULT - SUBJECTIVE AND OBJECTIVE BOX
INTERVAL HPI/OVERNIGHT EVENTS:   SURGERY ATTENDING    STATUS POST:      Exploratory Laparotomy, Lysis of adhesions, Reduction of incarcerated strangulated internal hernia, Small bowel resection 120 cm of ileum with side to side functional end to end anastomosis, Abdominal washout, Drainage, Closure.        POST OPERATIVE DAY #: 6      SUBJECTIVE:  Flatus: [x ] YES [ ] NO             Bowel Movement: [x ] YES [ ] NO  Pain (0-10):         2   Pain Control Adequate: [x ] YES [ ] NO  Nausea: [ ] YES [x ] NO            Vomiting: [ ] YES [x ] NO  Diarrhea: [ ] YES [x ] NO         Constipation: [ ] YES [x ] NO     Chest Pain: [ ] YES [x ] NO    SOB:  [ ] YES [x ] NO      MEDICATIONS  (STANDING):  cefTRIAXone   IVPB 2000 milliGRAM(s) IV Intermittent every 24 hours  dextrose 5%. 1000 milliLiter(s) (50 mL/Hr) IV Continuous <Continuous>  dextrose 50% Injectable 12.5 Gram(s) IV Push once  dextrose 50% Injectable 25 Gram(s) IV Push once  dextrose 50% Injectable 25 Gram(s) IV Push once  fluconAZOLE IVPB 400 milliGRAM(s) IV Intermittent every 24 hours  heparin   Injectable 5000 Unit(s) SubCutaneous every 8 hours  insulin lispro (HumaLOG) corrective regimen sliding scale   SubCutaneous Before meals and at bedtime  levothyroxine 100 MICROGram(s) Oral daily  metroNIDAZOLE  IVPB 500 milliGRAM(s) IV Intermittent every 8 hours  pantoprazole  Injectable 40 milliGRAM(s) IV Push daily    MEDICATIONS  (PRN):  acetaminophen   Tablet .. 650 milliGRAM(s) Oral every 6 hours PRN Mild Pain (1 - 3)  benzocaine 15 mG/menthol 3.6 mG (Sugar-Free) Lozenge 1 Lozenge Oral two times a day PRN Sore Throat  dextrose 40% Gel 15 Gram(s) Oral once PRN Blood Glucose LESS THAN 70 milliGRAM(s)/deciliter  glucagon  Injectable 1 milliGRAM(s) IntraMuscular once PRN Glucose LESS THAN 70 milligrams/deciliter  HYDROmorphone  Injectable 0.5 milliGRAM(s) IV Push every 4 hours PRN breakthrough  ondansetron Injectable 4 milliGRAM(s) IV Push every 6 hours PRN Nausea  oxyCODONE    IR 5 milliGRAM(s) Oral every 4 hours PRN Moderate Pain (4 - 6)  oxyCODONE    IR 10 milliGRAM(s) Oral every 6 hours PRN Severe Pain (7 - 10)      Vital Signs Last 24 Hrs  T(C): 37.2 (11 Oct 2020 08:42), Max: 37.2 (11 Oct 2020 08:42)  T(F): 99 (11 Oct 2020 08:42), Max: 99 (11 Oct 2020 08:42)  HR: 81 (11 Oct 2020 08:42) (71 - 85)  BP: 131/75 (11 Oct 2020 08:42) (131/75 - 160/63)  BP(mean): --  RR: 17 (11 Oct 2020 08:42) (14 - 18)  SpO2: 94% (11 Oct 2020 08:42) (94% - 98%)    PHYSICAL EXAM:      Constitutional:    Eyes:    ENMT:    Neck:    Breasts:    Back:    Respiratory:    Cardiovascular:    Gastrointestinal:    Genitourinary:    Rectal:    Extremities:    Vascular:    Neurological:    Skin:    Lymph Nodes:    Musculoskeletal:    Psychiatric:        I&O's Detail    10 Oct 2020 07:01  -  11 Oct 2020 07:00  --------------------------------------------------------  IN:    IV PiggyBack: 250 mL    IV PiggyBack: 100 mL    IV PiggyBack: 400 mL    Oral Fluid: 900 mL  Total IN: 1650 mL    OUT:    VAC (Vacuum Assisted Closure) System (mL): 0 mL    Voided (mL): 1150 mL  Total OUT: 1150 mL    Total NET: 500 mL      11 Oct 2020 07:01  -  11 Oct 2020 11:33  --------------------------------------------------------  IN:    Oral Fluid: 200 mL  Total IN: 200 mL    OUT:    Voided (mL): 200 mL  Total OUT: 200 mL    Total NET: 0 mL          LABS:                        8.7    11.97 )-----------( 381      ( 11 Oct 2020 06:02 )             26.6     10-11    140  |  103  |  15  ----------------------------<  153<H>  4.1   |  24  |  0.61    Ca    8.0<L>      11 Oct 2020 06:02  Phos  2.1     10-11  Mg     2.0     10-11            RADIOLOGY & ADDITIONAL STUDIES:

## 2020-10-11 NOTE — PROGRESS NOTE ADULT - SUBJECTIVE AND OBJECTIVE BOX
POST-OP DAY: 6 s/p ex lap, reduction of incarcerated SB, FRANCISCO, SBR, primary anastomosis      SUBJECTIVE: Patient seen and examined bedside by chief resident. Rob soft diet, denies N/V. no BM o/n but passing flatus.        MEDICATIONS  (STANDING):  cefTRIAXone   IVPB 2000 milliGRAM(s) IV Intermittent every 24 hours  dextrose 5%. 1000 milliLiter(s) (50 mL/Hr) IV Continuous <Continuous>  dextrose 50% Injectable 12.5 Gram(s) IV Push once  dextrose 50% Injectable 25 Gram(s) IV Push once  dextrose 50% Injectable 25 Gram(s) IV Push once  fluconAZOLE IVPB 400 milliGRAM(s) IV Intermittent every 24 hours  heparin   Injectable 5000 Unit(s) SubCutaneous every 8 hours  insulin lispro (HumaLOG) corrective regimen sliding scale   SubCutaneous Before meals and at bedtime  levothyroxine 100 MICROGram(s) Oral daily  metroNIDAZOLE  IVPB 500 milliGRAM(s) IV Intermittent every 8 hours  pantoprazole  Injectable 40 milliGRAM(s) IV Push daily    MEDICATIONS  (PRN):  acetaminophen   Tablet .. 650 milliGRAM(s) Oral every 6 hours PRN Mild Pain (1 - 3)  benzocaine 15 mG/menthol 3.6 mG (Sugar-Free) Lozenge 1 Lozenge Oral two times a day PRN Sore Throat  dextrose 40% Gel 15 Gram(s) Oral once PRN Blood Glucose LESS THAN 70 milliGRAM(s)/deciliter  glucagon  Injectable 1 milliGRAM(s) IntraMuscular once PRN Glucose LESS THAN 70 milligrams/deciliter  HYDROmorphone  Injectable 0.5 milliGRAM(s) IV Push every 4 hours PRN breakthrough  ondansetron Injectable 4 milliGRAM(s) IV Push every 6 hours PRN Nausea  oxyCODONE    IR 5 milliGRAM(s) Oral every 4 hours PRN Moderate Pain (4 - 6)  oxyCODONE    IR 10 milliGRAM(s) Oral every 6 hours PRN Severe Pain (7 - 10)      LABS:                        8.7    11.97 )-----------( 381      ( 11 Oct 2020 06:02 )             26.6     10-11    140  |  103  |  15  ----------------------------<  153<H>  4.1   |  24  |  0.61    Ca    8.0<L>      11 Oct 2020 06:02  Phos  2.1     10-11  Mg     2.0     10-11              Vital Signs Last 24 Hrs  T(C): 37.1 (11 Oct 2020 11:57), Max: 37.2 (11 Oct 2020 08:42)  T(F): 98.7 (11 Oct 2020 11:57), Max: 99 (11 Oct 2020 08:42)  HR: 86 (11 Oct 2020 11:57) (71 - 86)  BP: 156/70 (11 Oct 2020 11:57) (131/75 - 160/63)  BP(mean): --  RR: 17 (11 Oct 2020 08:42) (14 - 18)  SpO2: 94% (11 Oct 2020 08:42) (94% - 98%)      I&O's Summary    10 Oct 2020 07:01  -  11 Oct 2020 07:00  --------------------------------------------------------  IN: 1650 mL / OUT: 1150 mL / NET: 500 mL    11 Oct 2020 07:01  -  11 Oct 2020 12:29  --------------------------------------------------------  IN: 200 mL / OUT: 450 mL / NET: -250 mL      I&O's Detail    10 Oct 2020 07:01  -  11 Oct 2020 07:00  --------------------------------------------------------  IN:    IV PiggyBack: 250 mL    IV PiggyBack: 100 mL    IV PiggyBack: 400 mL    Oral Fluid: 900 mL  Total IN: 1650 mL    OUT:    VAC (Vacuum Assisted Closure) System (mL): 0 mL    Voided (mL): 1150 mL  Total OUT: 1150 mL    Total NET: 500 mL      11 Oct 2020 07:01  -  11 Oct 2020 12:29  --------------------------------------------------------  IN:    Oral Fluid: 200 mL  Total IN: 200 mL    OUT:    Voided (mL): 450 mL  Total OUT: 450 mL    Total NET: -250 mL          General: NAD, resting comfortably in bed  C/V: NSR  Pulm: Nonlabored breathing, no respiratory distress  Abd: soft, NT/ND, midline wound vac in place w minimal output  Extrem: WWP, no edema, SCDs in place

## 2020-10-12 ENCOUNTER — TRANSCRIPTION ENCOUNTER (OUTPATIENT)
Age: 75
End: 2020-10-12

## 2020-10-12 VITALS
SYSTOLIC BLOOD PRESSURE: 126 MMHG | HEART RATE: 72 BPM | RESPIRATION RATE: 15 BRPM | DIASTOLIC BLOOD PRESSURE: 54 MMHG | OXYGEN SATURATION: 97 % | TEMPERATURE: 99 F

## 2020-10-12 LAB
ANION GAP SERPL CALC-SCNC: 13 MMOL/L — SIGNIFICANT CHANGE UP (ref 5–17)
BUN SERPL-MCNC: 9 MG/DL — SIGNIFICANT CHANGE UP (ref 7–23)
CALCIUM SERPL-MCNC: 8 MG/DL — LOW (ref 8.4–10.5)
CHLORIDE SERPL-SCNC: 103 MMOL/L — SIGNIFICANT CHANGE UP (ref 96–108)
CO2 SERPL-SCNC: 25 MMOL/L — SIGNIFICANT CHANGE UP (ref 22–31)
CREAT SERPL-MCNC: 0.51 MG/DL — SIGNIFICANT CHANGE UP (ref 0.5–1.3)
GLUCOSE SERPL-MCNC: 139 MG/DL — HIGH (ref 70–99)
HCT VFR BLD CALC: 26.9 % — LOW (ref 34.5–45)
HGB BLD-MCNC: 8.7 G/DL — LOW (ref 11.5–15.5)
MAGNESIUM SERPL-MCNC: 2 MG/DL — SIGNIFICANT CHANGE UP (ref 1.6–2.6)
MCHC RBC-ENTMCNC: 29.4 PG — SIGNIFICANT CHANGE UP (ref 27–34)
MCHC RBC-ENTMCNC: 32.3 GM/DL — SIGNIFICANT CHANGE UP (ref 32–36)
MCV RBC AUTO: 90.9 FL — SIGNIFICANT CHANGE UP (ref 80–100)
NRBC # BLD: 0 /100 WBCS — SIGNIFICANT CHANGE UP (ref 0–0)
PHOSPHATE SERPL-MCNC: 1.9 MG/DL — LOW (ref 2.5–4.5)
PLATELET # BLD AUTO: 398 K/UL — SIGNIFICANT CHANGE UP (ref 150–400)
POTASSIUM SERPL-MCNC: 4 MMOL/L — SIGNIFICANT CHANGE UP (ref 3.5–5.3)
POTASSIUM SERPL-SCNC: 4 MMOL/L — SIGNIFICANT CHANGE UP (ref 3.5–5.3)
RBC # BLD: 2.96 M/UL — LOW (ref 3.8–5.2)
RBC # FLD: 14.4 % — SIGNIFICANT CHANGE UP (ref 10.3–14.5)
SARS-COV-2 RNA SPEC QL NAA+PROBE: SIGNIFICANT CHANGE UP
SODIUM SERPL-SCNC: 141 MMOL/L — SIGNIFICANT CHANGE UP (ref 135–145)
WBC # BLD: 9.52 K/UL — SIGNIFICANT CHANGE UP (ref 3.8–10.5)
WBC # FLD AUTO: 9.52 K/UL — SIGNIFICANT CHANGE UP (ref 3.8–10.5)

## 2020-10-12 PROCEDURE — 99233 SBSQ HOSP IP/OBS HIGH 50: CPT | Mod: GC

## 2020-10-12 PROCEDURE — 99231 SBSQ HOSP IP/OBS SF/LOW 25: CPT

## 2020-10-12 RX ORDER — CEFPODOXIME PROXETIL 100 MG
1 TABLET ORAL
Qty: 14 | Refills: 0
Start: 2020-10-12 | End: 2020-10-18

## 2020-10-12 RX ORDER — FLUCONAZOLE 150 MG/1
1 TABLET ORAL
Qty: 8 | Refills: 0
Start: 2020-10-12 | End: 2020-10-19

## 2020-10-12 RX ORDER — FAMOTIDINE 10 MG/ML
20 INJECTION INTRAVENOUS ONCE
Refills: 0 | Status: COMPLETED | OUTPATIENT
Start: 2020-10-12 | End: 2020-10-12

## 2020-10-12 RX ORDER — METRONIDAZOLE 500 MG
1 TABLET ORAL
Qty: 21 | Refills: 0
Start: 2020-10-12 | End: 2020-10-18

## 2020-10-12 RX ADMIN — Medication 100 MILLIGRAM(S): at 09:15

## 2020-10-12 RX ADMIN — Medication 2: at 12:14

## 2020-10-12 RX ADMIN — Medication 100 MILLIGRAM(S): at 00:09

## 2020-10-12 RX ADMIN — OXYCODONE HYDROCHLORIDE 10 MILLIGRAM(S): 5 TABLET ORAL at 16:19

## 2020-10-12 RX ADMIN — HEPARIN SODIUM 5000 UNIT(S): 5000 INJECTION INTRAVENOUS; SUBCUTANEOUS at 09:51

## 2020-10-12 RX ADMIN — FAMOTIDINE 20 MILLIGRAM(S): 10 INJECTION INTRAVENOUS at 00:22

## 2020-10-12 RX ADMIN — HEPARIN SODIUM 5000 UNIT(S): 5000 INJECTION INTRAVENOUS; SUBCUTANEOUS at 00:09

## 2020-10-12 RX ADMIN — Medication 100 MICROGRAM(S): at 05:51

## 2020-10-12 RX ADMIN — OXYCODONE HYDROCHLORIDE 10 MILLIGRAM(S): 5 TABLET ORAL at 09:52

## 2020-10-12 RX ADMIN — Medication 64.25 MILLIMOLE(S): at 11:08

## 2020-10-12 RX ADMIN — PANTOPRAZOLE SODIUM 40 MILLIGRAM(S): 20 TABLET, DELAYED RELEASE ORAL at 11:08

## 2020-10-12 RX ADMIN — OXYCODONE HYDROCHLORIDE 10 MILLIGRAM(S): 5 TABLET ORAL at 15:38

## 2020-10-12 RX ADMIN — OXYCODONE HYDROCHLORIDE 10 MILLIGRAM(S): 5 TABLET ORAL at 10:30

## 2020-10-12 NOTE — PROGRESS NOTE ADULT - SUBJECTIVE AND OBJECTIVE BOX
Patient is a 75y old  Female who presents with a chief complaint of SBO (09 Oct 2020 07:48)    O/N and interval events: None    Subjective:  Pt currently feels well. Denies abdominal pain, SOB, CP. ROS is otherwise negative.     Allergies    gabapentin (Rash)  NSAIDs (Unknown)  penicillin (Unknown)  tramadol (Hives)    Intolerances    Home meds: Metformin, Synthroid, codeine sulfate    MEDICATIONS  (STANDING):  cefTRIAXone   IVPB 2000 milliGRAM(s) IV Intermittent every 24 hours  dextrose 5%. 1000 milliLiter(s) (50 mL/Hr) IV Continuous <Continuous>  dextrose 50% Injectable 12.5 Gram(s) IV Push once  dextrose 50% Injectable 25 Gram(s) IV Push once  dextrose 50% Injectable 25 Gram(s) IV Push once  fluconAZOLE IVPB 400 milliGRAM(s) IV Intermittent every 24 hours  heparin   Injectable 5000 Unit(s) SubCutaneous every 8 hours  insulin lispro (HumaLOG) corrective regimen sliding scale   SubCutaneous Before meals and at bedtime  levothyroxine 100 MICROGram(s) Oral daily  metroNIDAZOLE  IVPB 500 milliGRAM(s) IV Intermittent every 8 hours  pantoprazole  Injectable 40 milliGRAM(s) IV Push daily    MEDICATIONS  (PRN):  acetaminophen   Tablet .. 650 milliGRAM(s) Oral every 6 hours PRN Mild Pain (1 - 3)  benzocaine 15 mG/menthol 3.6 mG (Sugar-Free) Lozenge 1 Lozenge Oral two times a day PRN Sore Throat  dextrose 40% Gel 15 Gram(s) Oral once PRN Blood Glucose LESS THAN 70 milliGRAM(s)/deciliter  glucagon  Injectable 1 milliGRAM(s) IntraMuscular once PRN Glucose LESS THAN 70 milligrams/deciliter  HYDROmorphone  Injectable 0.5 milliGRAM(s) IV Push every 4 hours PRN breakthrough  ondansetron Injectable 4 milliGRAM(s) IV Push every 6 hours PRN Nausea  oxyCODONE    IR 5 milliGRAM(s) Oral every 4 hours PRN Moderate Pain (4 - 6)  oxyCODONE    IR 10 milliGRAM(s) Oral every 6 hours PRN Severe Pain (7 - 10)        Drug Dosing Weight  Height (cm): 154.9 (05 Oct 2020 16:17)  Weight (kg): 72.7 (05 Oct 2020 21:05)  BMI (kg/m2): 30.3 (05 Oct 2020 21:05)  BSA (m2): 1.72 (05 Oct 2020 21:05)    PAST MEDICAL & SURGICAL HISTORY:  Degenerative disc disease, lumbar    Cataracts, bilateral    Rheumatoid arthritis    DM type 2 (diabetes mellitus, type 2)    S/P carpal tunnel release    H/O shoulder surgery    History of bilateral oophorectomies    History of appendectomy    History of cholecystectomy        FAMILY HISTORY:  No pertinent family history in first degree relatives of cardiac disease    SOCIAL HISTORY:  no smoking, no EtOH use, no drug use    ADVANCE DIRECTIVES:    Vital Signs Last 24 Hrs  T(C): 37.2 (12 Oct 2020 09:01), Max: 37.3 (11 Oct 2020 20:32)  T(F): 99 (12 Oct 2020 09:01), Max: 99.2 (11 Oct 2020 20:32)  HR: 72 (12 Oct 2020 09:01) (72 - 87)  BP: 126/54 (12 Oct 2020 09:01) (119/73 - 137/64)  BP(mean): --  RR: 15 (12 Oct 2020 09:01) (15 - 19)  SpO2: 97% (12 Oct 2020 09:01) (95% - 98%)    PHYSICAL EXAM:      Constitutional: NAD  Eyes: PERRLA  ENMT: MMM  Neck: supple  Back: midline  Respiratory: CTA b/l  Cardiovascular: rrr, s1s2, no m/r/g  Gastrointestinal: soft, NTND, + BS, incisions CDI  Extremities: wwp  Vascular: + 2 pulses radial  Neurological: AAO x 4  Skin: no rash  Lymph Nodes: no LAD  Musculoskeletal: no joint swelling  Psychiatric: normal affect      LABS:                        8.7    9.52  )-----------( 398      ( 12 Oct 2020 06:52 )             26.9   10-12    141  |  103  |  9   ----------------------------<  139<H>  4.0   |  25  |  0.51    Ca    8.0<L>      12 Oct 2020 06:52  Phos  1.9     10-12  Mg     2.0     10-12            EKG:    ECHO, US:  < from: US Duplex Venous Lower Ext Complete, Bilateral (10.07.20 @ 19:15) >  IMPRESSION:  No vein thrombosis seen.    < end of copied text >      RADIOLOGY:  < from: CT Abdomen and Pelvis w/ IV Cont (10.05.20 @ 14:02) >  Impression: Dilated small bowel loops with more than one transition point, consistent with closed loop small bowel obstruction, likely due to adhesions. Hypoenhancing small bowel walls with moderate ascites and mesenteric edema, consistent with bowel ischemia.    < end of copied text >

## 2020-10-12 NOTE — PROGRESS NOTE ADULT - REASON FOR ADMISSION
SBO

## 2020-10-12 NOTE — PROGRESS NOTE ADULT - ATTENDING COMMENTS
Abdomen distended, soft, BS+,  Flatus+, Bm-, VAC in place, working well, tolerating diet,  IVF, IV ABX, DVT prophylaxis, F/U LABS, VS, Discharge planing to REHAB.

## 2020-10-12 NOTE — PROGRESS NOTE ADULT - PROBLEM SELECTOR PROBLEM 3
RBBB (right bundle branch block)
Rheumatoid arthritis

## 2020-10-12 NOTE — DISCHARGE NOTE NURSING/CASE MANAGEMENT/SOCIAL WORK - PATIENT PORTAL LINK FT
You can access the FollowMyHealth Patient Portal offered by [facility] by registering at the following website: [email or web address]. By joining [employer]’s FollowMyHealth portal, you will also be able to view your health information using other applications (apps) compatible with our system.

## 2020-10-12 NOTE — PROGRESS NOTE ADULT - ASSESSMENT
75F PMHx RA, DM, hypothyroidism, cataracts, ?neuromuscular disease, PSHx lap willem, lap appy, b/l oophorectomy for ovarian cysts presents with abd pain x 1 day. Reports sudden onset generalized abd pain found to have SBO, with bowel ischemia, now s/p exploratory laparotomy, SBR and primary anastomosis (10/05/2020) now w/ resumption of bowel function, tolerating soft diet.

## 2020-10-12 NOTE — PROGRESS NOTE ADULT - SUBJECTIVE AND OBJECTIVE BOX
INTERVAL HPI/OVERNIGHT EVENTS: Remains afebrile, tolerates IV abx well    CONSTITUTIONAL:  Negative fever or chills, feels well, good appetite  EYES:  Negative  blurry vision or double vision  CARDIOVASCULAR:  Negative for chest pain or palpitations  RESPIRATORY:  Negative for cough, wheezing, or SOB   GASTROINTESTINAL:  Negative for nausea, vomiting, diarrhea, constipation, minimal post op abdominal pain  GENITOURINARY:  Negative frequency, urgency or dysuria  NEUROLOGIC:  No headache, confusion, dizziness, lightheadedness      ANTIBIOTICS/RELEVANT:    MEDICATIONS  (STANDING):  cefTRIAXone   IVPB 2000 milliGRAM(s) IV Intermittent every 24 hours  dextrose 5%. 1000 milliLiter(s) (50 mL/Hr) IV Continuous <Continuous>  dextrose 50% Injectable 12.5 Gram(s) IV Push once  dextrose 50% Injectable 25 Gram(s) IV Push once  dextrose 50% Injectable 25 Gram(s) IV Push once  fluconAZOLE IVPB 400 milliGRAM(s) IV Intermittent every 24 hours  heparin   Injectable 5000 Unit(s) SubCutaneous every 8 hours  insulin lispro (HumaLOG) corrective regimen sliding scale   SubCutaneous Before meals and at bedtime  levothyroxine 100 MICROGram(s) Oral daily  metroNIDAZOLE  IVPB 500 milliGRAM(s) IV Intermittent every 8 hours  pantoprazole  Injectable 40 milliGRAM(s) IV Push daily  sodium phosphate IVPB 21 milliMole(s) IV Intermittent once    MEDICATIONS  (PRN):  acetaminophen   Tablet .. 650 milliGRAM(s) Oral every 6 hours PRN Mild Pain (1 - 3)  benzocaine 15 mG/menthol 3.6 mG (Sugar-Free) Lozenge 1 Lozenge Oral two times a day PRN Sore Throat  dextrose 40% Gel 15 Gram(s) Oral once PRN Blood Glucose LESS THAN 70 milliGRAM(s)/deciliter  glucagon  Injectable 1 milliGRAM(s) IntraMuscular once PRN Glucose LESS THAN 70 milligrams/deciliter  HYDROmorphone  Injectable 0.5 milliGRAM(s) IV Push every 4 hours PRN breakthrough  ondansetron Injectable 4 milliGRAM(s) IV Push every 6 hours PRN Nausea  oxyCODONE    IR 5 milliGRAM(s) Oral every 4 hours PRN Moderate Pain (4 - 6)  oxyCODONE    IR 10 milliGRAM(s) Oral every 6 hours PRN Severe Pain (7 - 10)        Vital Signs Last 24 Hrs  T(C): 37.2 (12 Oct 2020 09:01), Max: 37.3 (11 Oct 2020 20:32)  T(F): 99 (12 Oct 2020 09:01), Max: 99.2 (11 Oct 2020 20:32)  HR: 72 (12 Oct 2020 09:01) (72 - 87)  BP: 126/54 (12 Oct 2020 09:01) (119/73 - 156/70)  BP(mean): --  RR: 15 (12 Oct 2020 09:01) (15 - 19)  SpO2: 97% (12 Oct 2020 09:01) (95% - 98%)    10-11-20 @ 07:01  -  10-12-20 @ 07:00  --------------------------------------------------------  IN: 1160 mL / OUT: 1250 mL / NET: -90 mL    10-12-20 @ 07:01  -  10-12-20 @ 10:06  --------------------------------------------------------  IN: 150 mL / OUT: 200 mL / NET: -50 mL      PHYSICAL EXAM:  Constitutional: Well-developed, well nourished  Eyes:TYSON, EOMI  Ear/Nose/Throat: no oral lesion, no sinus tenderness on percussion	  Neck:no JVD, no lymphadenopathy, supple  Respiratory: CTA sridhar  Cardiovascular: S1S2 RRR, no murmurs  Gastrointestinal: soft, incisions healed well,(+) BS, no HSM  Extremities: no e/e/c  Vascular: DP Pulse: right normal; left normal      LABS:                        8.7    9.52  )-----------( 398      ( 12 Oct 2020 06:52 )             26.9     10-12    141  |  103  |  9   ----------------------------<  139<H>  4.0   |  25  |  0.51    Ca    8.0<L>      12 Oct 2020 06:52  Phos  1.9     10-12  Mg     2.0     10-12            MICROBIOLOGY:  Culture - Body Fluid with Gram Stain (10.06.20 @ 01:41)    Gram Stain:   Few White blood cells  No organisms seen    Specimen Source: .Body Fluid Intraabdominal Fluid #2    Culture Results:   No growth to date        RADIOLOGY & ADDITIONAL STUDIES:

## 2020-10-12 NOTE — PROGRESS NOTE ADULT - ASSESSMENT
75F PMHx RA, DM, hypothyroidism, cataracts, ?neuromuscular disease, PSHx lap willem, lap appy, b/l oophorectomy for ovarian cysts a/w SBO, with bowel ischemia, now s/p exploratory laparotomy, SBR and primary anastomosis (10/05/2020).    Soft diet  plan for d/c tomorrow to [facility]  AM labs  Pain/nausea control  f/u CXR  ISS FS improved overnight. synthroid  ID: zosyn (10/5-), fluconazole (10/5-), ID Dr. Ayala consulted  SCDs, SQH. protonix  Prevena vac (incisional) 75F PMHx RA, DM, hypothyroidism, cataracts, ?neuromuscular disease, PSHx lap willem, lap appy, b/l oophorectomy for ovarian cysts a/w SBO, with bowel ischemia, now s/p exploratory laparotomy, SBR and primary anastomosis (10/05/2020). Pt doing well, tolerating PO and ambulating. Will plan for [facility] today.    Soft diet  plan for [facility] today  f/u AM labs  Pain/nausea control  ISS FS improved overnight. synthroid  f/u Abx recs from Dr. Ayala  SCDs, [facility]. protonix  Prevena vac (incisional)

## 2020-10-12 NOTE — PROGRESS NOTE ADULT - ASSESSMENT
75F PMHx RA, DM, hypothyroidism, cataracts, ?neuromuscular disease, PSHx lap willem, lap appy, b/l oophorectomy for ovarian cysts a/w SBO, questionable bowel ischemia, POD#7 s/p exploratory laparotomy, SBR and primary anastomosis

## 2020-10-12 NOTE — PROGRESS NOTE ADULT - SUBJECTIVE AND OBJECTIVE BOX
INTERVAL HPI/OVERNIGHT EVENTS:   SURGERY ATTENDING    STATUS POST:      Exploratory Laparotomy, Lysis of adhesions, Reduction of incarcerated strangulated internal hernia, Small bowel resection 120 cm of ileum with side to side functional end to end anastomosis, Abdominal washout, Drainage, Closure.        POST OPERATIVE DAY #: 7    SUBJECTIVE:  Flatus: [x ] YES [ ] NO             Bowel Movement: [x ] YES [ ] NO  Pain (0-10):         2   Pain Control Adequate: [x ] YES [ ] NO  Nausea: [ ] YES [x ] NO            Vomiting: [ ] YES [x ] NO  Diarrhea: [ ] YES [x ] NO         Constipation: [ ] YES [x ] NO     Chest Pain: [ ] YES [x ] NO    SOB:  [ ] YES [x ] NO    MEDICATIONS  (STANDING):  cefTRIAXone   IVPB 2000 milliGRAM(s) IV Intermittent every 24 hours  dextrose 5%. 1000 milliLiter(s) (50 mL/Hr) IV Continuous <Continuous>  dextrose 50% Injectable 12.5 Gram(s) IV Push once  dextrose 50% Injectable 25 Gram(s) IV Push once  dextrose 50% Injectable 25 Gram(s) IV Push once  fluconAZOLE IVPB 400 milliGRAM(s) IV Intermittent every 24 hours  heparin   Injectable 5000 Unit(s) SubCutaneous every 8 hours  insulin lispro (HumaLOG) corrective regimen sliding scale   SubCutaneous Before meals and at bedtime  levothyroxine 100 MICROGram(s) Oral daily  metroNIDAZOLE  IVPB 500 milliGRAM(s) IV Intermittent every 8 hours  pantoprazole  Injectable 40 milliGRAM(s) IV Push daily  sodium phosphate IVPB 21 milliMole(s) IV Intermittent once    MEDICATIONS  (PRN):  acetaminophen   Tablet .. 650 milliGRAM(s) Oral every 6 hours PRN Mild Pain (1 - 3)  benzocaine 15 mG/menthol 3.6 mG (Sugar-Free) Lozenge 1 Lozenge Oral two times a day PRN Sore Throat  dextrose 40% Gel 15 Gram(s) Oral once PRN Blood Glucose LESS THAN 70 milliGRAM(s)/deciliter  glucagon  Injectable 1 milliGRAM(s) IntraMuscular once PRN Glucose LESS THAN 70 milligrams/deciliter  HYDROmorphone  Injectable 0.5 milliGRAM(s) IV Push every 4 hours PRN breakthrough  ondansetron Injectable 4 milliGRAM(s) IV Push every 6 hours PRN Nausea  oxyCODONE    IR 5 milliGRAM(s) Oral every 4 hours PRN Moderate Pain (4 - 6)  oxyCODONE    IR 10 milliGRAM(s) Oral every 6 hours PRN Severe Pain (7 - 10)      Vital Signs Last 24 Hrs  T(C): 37.2 (12 Oct 2020 09:01), Max: 37.3 (11 Oct 2020 20:32)  T(F): 99 (12 Oct 2020 09:01), Max: 99.2 (11 Oct 2020 20:32)  HR: 72 (12 Oct 2020 09:01) (72 - 87)  BP: 126/54 (12 Oct 2020 09:01) (119/73 - 156/70)  BP(mean): --  RR: 15 (12 Oct 2020 09:01) (15 - 19)  SpO2: 97% (12 Oct 2020 09:01) (95% - 98%)    PHYSICAL EXAM:      Constitutional:    Eyes:    ENMT:    Neck:    Breasts:    Back:    Respiratory:    Cardiovascular:    Gastrointestinal:    Genitourinary:    Rectal:    Extremities:    Vascular:    Neurological:    Skin:    Lymph Nodes:    Musculoskeletal:    Psychiatric:        I&O's Detail    11 Oct 2020 07:01  -  12 Oct 2020 07:00  --------------------------------------------------------  IN:    IV PiggyBack: 300 mL    Oral Fluid: 860 mL  Total IN: 1160 mL    OUT:    VAC (Vacuum Assisted Closure) System (mL): 0 mL    Voided (mL): 1250 mL  Total OUT: 1250 mL    Total NET: -90 mL      12 Oct 2020 07:01  -  12 Oct 2020 09:57  --------------------------------------------------------  IN:  Total IN: 0 mL    OUT:    Voided (mL): 200 mL  Total OUT: 200 mL    Total NET: -200 mL          LABS:                        8.7    9.52  )-----------( 398      ( 12 Oct 2020 06:52 )             26.9     10-12    141  |  103  |  9   ----------------------------<  139<H>  4.0   |  25  |  0.51    Ca    8.0<L>      12 Oct 2020 06:52  Phos  1.9     10-12  Mg     2.0     10-12            RADIOLOGY & ADDITIONAL STUDIES:

## 2020-10-12 NOTE — PROGRESS NOTE ADULT - NSHPATTENDINGPLANDISCUSS_GEN_ALL_CORE
Surgery team 4
surgery team, pt

## 2020-10-12 NOTE — DISCHARGE NOTE PROVIDER - CARE PROVIDER_API CALL
Misty Hines  SURGERY  [address], [address]  [place], [place] [address]  Phone: [phone]  Fax: [phone]  Follow Up Time:

## 2020-10-12 NOTE — DISCHARGE NOTE PROVIDER - NSDCFUADDINST_GEN_ALL_CORE_FT
Activity: No heavy lifting or strenuous activity for 2 weeks. Walk as tolerated. Physical therapy per routine.     Wound Care: You may shower. No baths, hot tubs or swimming until approved by your surgeon.     Call the office for follow up.    Call office for fever >101.5.    You have been prescribed oral antibiotics. Please be sure to complete the entire course as directed. Please take PO Cefpodoxime 200mg q12h and PO Metronidazole 500mg q8h until 10/19.     Please continue Fluconazole 200 mg PO q24h until 10/20.     Please use you incentive spirometer regularly. Inhale for 5-10 seconds with a goal of "1000" marked on the instrument. Please do this 10 times per hour for at least 1 week.    Activity: No heavy lifting or strenuous activity for 2 weeks. Walk as tolerated. Physical therapy per routine.     Wound Care: You may shower. No baths, hot tubs or swimming until approved by your surgeon.     Call the office for follow up.    Call office for fever >101.5.    You have been prescribed oral antibiotics. Please be sure to complete the entire course as directed. Please take PO Cefpodoxime 200mg q12h and PO Metronidazole 500mg q8h until 10/19.     Please continue Fluconazole 200 mg PO q24h until 10/20.     Please use you incentive spirometer regularly. Inhale for 5-10 seconds with a goal of "1000" marked on the instrument. Please do this 10 times per hour for at least 1 week.      Patient is going home with an abdominal binder. Please wear the binder throughout the day as tolerated for 2 weeks or until your follow up with Dr. Hines.     Continue to take your home medications as prescribed. Activity: No heavy lifting or strenuous activity for 2 weeks. Walk as tolerated. Physical therapy per routine.     Wound Care: You may shower. No baths, hot tubs or swimming until approved by your surgeon.     Call the office for follow up.    Call office for fever >101.5.    You have been prescribed oral antibiotics. Please be sure to complete the entire course as directed. Please take PO Cefpodoxime 200mg q12h and PO Metronidazole 500mg q8h until 10/19.     Please continue Fluconazole 200 mg PO q24h until 10/20.     Please use you incentive spirometer regularly. Inhale for 5-10 seconds with a goal of "1000" marked on the instrument. Please do this 10 times per hour for at least 1 week.      Patient is going home with an abdominal binder. Please wear the binder throughout the day as tolerated for 2 weeks or until your follow up with Dr. Hines.     Continue to take your home medications as prescribed.     Please take 2 extra strength Tylenol and 1 Advil every 6 hours for 2 days. You can then take over the counter pain medication as needed.     Please use ice packs on the abdomen as often as possible for at least 1 week.

## 2020-10-12 NOTE — PROGRESS NOTE ADULT - SUBJECTIVE AND OBJECTIVE BOX
INTERVAL HISTORY:  No CP or SOB  	  MEDICATIONS:  cefTRIAXone   IVPB 2000 milliGRAM(s) IV Intermittent every 24 hours  fluconAZOLE IVPB 400 milliGRAM(s) IV Intermittent every 24 hours  metroNIDAZOLE  IVPB 500 milliGRAM(s) IV Intermittent every 8 hour    acetaminophen   Tablet .. 650 milliGRAM(s) Oral every 6 hours PRN  HYDROmorphone  Injectable 0.5 milliGRAM(s) IV Push every 4 hours PRN  ondansetron Injectable 4 milliGRAM(s) IV Push every 6 hours PRN  oxyCODONE    IR 5 milliGRAM(s) Oral every 4 hours PRN  oxyCODONE    IR 10 milliGRAM(s) Oral every 6 hours PRN    pantoprazole  Injectable 40 milliGRAM(s) IV Push daily    dextrose 40% Gel 15 Gram(s) Oral once PRN  dextrose 50% Injectable 12.5 Gram(s) IV Push once  dextrose 50% Injectable 25 Gram(s) IV Push once  dextrose 50% Injectable 25 Gram(s) IV Push once  glucagon  Injectable 1 milliGRAM(s) IntraMuscular once PRN  insulin lispro (HumaLOG) corrective regimen sliding scale   SubCutaneous Before meals and at bedtime  levothyroxine 100 MICROGram(s) Oral daily    benzocaine 15 mG/menthol 3.6 mG (Sugar-Free) Lozenge 1 Lozenge Oral two times a day PRN  dextrose 5%. 1000 milliLiter(s) IV Continuous <Continuous>  heparin   Injectable 5000 Unit(s) SubCutaneous every 8 hours        PHYSICAL EXAM:  T(C): 36.8 (10-12-20 @ 05:47), Max: 37.3 (10-11-20 @ 20:32)  HR: 78 (10-12-20 @ 05:47) (76 - 87)  BP: 137/64 (10-12-20 @ 05:47) (119/73 - 156/70)  RR: 15 (10-12-20 @ 05:47) (15 - 19)  SpO2: 96% (10-12-20 @ 05:47) (94% - 98%)  Wt(kg): --  I&O's Summary    11 Oct 2020 07:01  -  12 Oct 2020 07:00  --------------------------------------------------------  IN: 1160 mL / OUT: 1250 mL / NET: -90 mL          Appearance: Normal	  Cardiovascular: Normal S1 S2, No JVD, No murmurs, No edema  Respiratory: Lungs clear to auscultation	  Psychiatry: A & O x 3, Mood & affect appropriate  Gastrointestinal:  Soft, Non-tender, + BS	  Skin: No rashes, No ecchymoses, No cyanosis  Neurologic: Non-focal  Extremities:  No clubbing, cyanosis or edema  Vascular: Peripheral pulses palpable 2+ bilaterally    TELEMETRY: 	NSR    ECG:  	  RADIOLOGY:   DIAGNOSTIC TESTING:  [ ] Echocardiogram:  [ ]  Catheterization:  [ ] Stress Test:    OTHER: 	    LABS:	 	    CARDIAC MARKERS:                                  8.7    9.52  )-----------( 398      ( 12 Oct 2020 06:52 )             26.9     10-11    140  |  103  |  15  ----------------------------<  153<H>  4.1   |  24  |  0.61    Ca    8.0<L>      11 Oct 2020 06:02  Phos  2.1     10-11  Mg     2.0     10-11      proBNP:   Lipid Profile:   HgA1c:   TSH:     ASSESSMENT/PLAN:

## 2020-10-12 NOTE — PROGRESS NOTE ADULT - PROBLEM SELECTOR PROBLEM 1
Type 2 diabetes mellitus without complication, without long-term current use of insulin
SBO (small bowel obstruction)

## 2020-10-12 NOTE — PROGRESS NOTE ADULT - PROBLEM SELECTOR PLAN 1
Prior w/u for CAD has included a coronary calcium score (zero) and CCTA (no significant dz) in 2010
Prior w/u for CAD has included a coronary calcium score (zero) and CCTA (no significant dz) in 2010.  Mobilize, get OOB to chair, ambulate
1) Patient is tolerating Zosyn well although she reports rash to penicillin in adulthood; explained to patient that she is not allergic.  2) Switch to IV Ceftriaxone 2gr q24h and Metronidazole 500mg IV q8h; day 5/14  upon discharge can change to PO Cefpodoxime 200mg q12h and PO Metronidazole 500mg q8h to complete course   3) Continue Fluconazole 400mg PO q24h, POD# 4/14  4) Still pending OR culture: (+) Gram positive cocci.
1) Patient is tolerating Zosyn well although she reports rash to penicillin in adulthood; explained to patient that she is not allergic.  2) Switched to IV Ceftriaxone 2gr q24h and Metronidazole 500mg IV q8h; day 6/14  upon discharge can change to PO Cefpodoxime 200mg q12h and PO Metronidazole 500mg q8h to complete course   3) Continue Fluconazole 200 mg PO q24h, POD# 5/14
1) Patient is tolerating Zosyn well although she reports rash to penicillin in adulthood; explained to patient that she is not allergic.  2) Switched to IV Ceftriaxone 2gr q24h and Metronidazole 500mg IV q8h; day 7/14  upon discharge can change to PO Cefpodoxime 200mg q12h and PO Metronidazole 500mg q8h to complete course   3) Continue Fluconazole 200 mg PO q24h, POD# 6/14.
1) Patient is tolerating Zosyn well although she reports rash to penicillin in childhood  2) Continue Zosyn 3.375gr IV q6h; added Fluconazole 400mg IV q24h  3) Pending OR culture.
1) Patient is tolerating Zosyn well although she reports rash to penicillin in childhood  2) Continue Zosyn 3.375gr IV q6h; added Fluconazole 400mg IV q24h, POD#3  3) Pending OR culture: (+) Gram positive cocci
1) Patient is tolerating Zosyn well although she reports rash to penicillin in childhood  2) Switch to IV Ceftriaxone 2gr q24h and Metronidazole 500mg IV q8h; day 4/14  upon discharge can change to PO Cefpodoxime 200mg q12h and Po Metronidazole 500mg q8h to complete course   3) Continue Fluconazole 400mg PO q24h, POD# 4/14  4) Still pending OR culture: (+) Gram positive cocci.
Management as per primary team

## 2020-10-12 NOTE — DISCHARGE NOTE PROVIDER - HOSPITAL COURSE
75F PMHx RA, DM, hypothyroidism, cataracts, ?neuromuscular disease, PSHx lap willem, lap appy, b/l oophorectomy for ovarian cysts, presented to ED w/ abd pain/nv. CT Dilated small bowel loops with more than one transition point, consistent with closed loop small bowel obstruction, likely due to adhesions. Hypo-enhancing small bowel walls with moderate ascites and mesenteric edema, consistent with bowel ischemia. Labs in ED sig for LA 4.7, got total 3 liters, LA improved to 2.4. WBC 17.39, given ceftriaxone/flagyl in ED. S/p ex lap on 10/5, reduction of incarcerated SB, FRANCISCO, SBR (120 cm ileum), primary anastomosis. Post-op she stayed in the ICU and patient was left intubated overnight, extubated the following morning without issue. Wh   75F PMHx RA, DM, hypothyroidism, cataracts, ?neuromuscular disease, PSHx lap willem, lap appy, b/l oophorectomy for ovarian cysts, presented to ED w/ abd pain/nv. CT Dilated small bowel loops with more than one transition point, consistent with closed loop small bowel obstruction, likely due to adhesions. Hypo-enhancing small bowel walls with moderate ascites and mesenteric edema, consistent with bowel ischemia. Labs in ED sig for LA 4.7, got total 3 liters, LA improved to 2.4. WBC 17.39, given ceftriaxone/flagyl in ED. S/p ex lap on 10/5, reduction of incarcerated SB, FRANCISCO, SBR (120 cm ileum), primary anastomosis. Post-op she stayed in the ICU and patient was left intubated overnight, extubated the following morning without issue. Her ICU stay was complicated by hemoglobin of 7.5 which resolved without intervention.    75F PMHx RA, DM, hypothyroidism, cataracts, ?neuromuscular disease, PSHx lap willem, lap appy, b/l oophorectomy for ovarian cysts, presented to ED w/ abd pain/nv. CT Dilated small bowel loops with more than one transition point, consistent with closed loop small bowel obstruction, likely due to adhesions. Hypo-enhancing small bowel walls with moderate ascites and mesenteric edema, consistent with bowel ischemia. Labs in ED sig for LA 4.7, got total 3 liters, LA improved to 2.4. WBC 17.39, given ceftriaxone/flagyl in ED. S/p ex lap on 10/5, reduction of incarcerated SB, FRANCISCO, SBR (120 cm ileum), primary anastomosis. Post-op she stayed in the ICU and patient was left intubated overnight, extubated the following morning without issue. Her ICU stay was complicated by hemoglobin of 7.5 which resolved without intervention. She remained stable in the ICU and was transferred to the floor. On the floor, patient had no acute issues. She was advanced to a soft diet which she tolerated well. She had return of bowel function and voided appropriately. A chest xray was performed after the patient had a mildly elevated WBC to 12 which showed a left pleural effusion, likely due to atelectasis and immobility. She was encouraged to sit in a chair, walk with physical therapy, and use the incentive spirometer. He subsequent WBC count was wnl. She is stable and ready for discharge.

## 2020-10-12 NOTE — PROGRESS NOTE ADULT - PROBLEM SELECTOR PROBLEM 2
LBBB (left bundle branch block)
Type 2 diabetes mellitus without complication, without long-term current use of insulin

## 2020-10-12 NOTE — PROGRESS NOTE ADULT - SUBJECTIVE AND OBJECTIVE BOX
STATUS POST:  10/5: ex lap, reduction of incarcerated SB, FRANCISCO, SBR (120 cm ileum), primary anastomosis       SUBJECTIVE: Patient seen and examined bedside by chief resident. O/N: Tolerating diet well. IGNACIO. VSS. Pepcid for heartburn symptoms.     cefTRIAXone   IVPB 2000 milliGRAM(s) IV Intermittent every 24 hours  fluconAZOLE IVPB 400 milliGRAM(s) IV Intermittent every 24 hours  heparin   Injectable 5000 Unit(s) SubCutaneous every 8 hours  metroNIDAZOLE  IVPB 500 milliGRAM(s) IV Intermittent every 8 hours      Vital Signs Last 24 Hrs  T(C): 37.3 (11 Oct 2020 20:32), Max: 37.3 (11 Oct 2020 20:32)  T(F): 99.2 (11 Oct 2020 20:32), Max: 99.2 (11 Oct 2020 20:32)  HR: 76 (12 Oct 2020 00:10) (76 - 87)  BP: 119/73 (12 Oct 2020 00:10) (119/73 - 156/70)  BP(mean): --  RR: 17 (12 Oct 2020 00:10) (17 - 19)  SpO2: 98% (12 Oct 2020 00:10) (94% - 98%)  I&O's Detail    10 Oct 2020 07:01  -  11 Oct 2020 07:00  --------------------------------------------------------  IN:    IV PiggyBack: 250 mL    IV PiggyBack: 100 mL    IV PiggyBack: 400 mL    Oral Fluid: 900 mL  Total IN: 1650 mL    OUT:    VAC (Vacuum Assisted Closure) System (mL): 0 mL    Voided (mL): 1150 mL  Total OUT: 1150 mL    Total NET: 500 mL      11 Oct 2020 07:01  -  12 Oct 2020 06:00  --------------------------------------------------------  IN:    IV PiggyBack: 300 mL    Oral Fluid: 860 mL  Total IN: 1160 mL    OUT:    VAC (Vacuum Assisted Closure) System (mL): 0 mL    Voided (mL): 950 mL  Total OUT: 950 mL    Total NET: 210 mL          Physical Exam:  General: No acute distress, resting comfortably in bed  C/V: normal sinus rhythm  Pulm: Nonlabored breathing, no respiratory distress  Abd: soft, non-tender, non-distended, incisions clean/dry/intact.  Extrem: warm and well perfused, no edema, SCDs in place    LABS:                        8.7    11.97 )-----------( 381      ( 11 Oct 2020 06:02 )             26.6     10-11    140  |  103  |  15  ----------------------------<  153<H>  4.1   |  24  |  0.61    Ca    8.0<L>      11 Oct 2020 06:02  Phos  2.1     10-11  Mg     2.0     10-11            RADIOLOGY & ADDITIONAL STUDIES:   STATUS POST:  10/5: ex lap, reduction of incarcerated SB, FRANCISCO, SBR (120 cm ileum), primary anastomosis       SUBJECTIVE: Patient seen and examined bedside by chief resident. O/N: Tolerating diet well. IGNACIO. VSS. Pepcid for heartburn symptoms. Ambulated yesterday w/ some incisional pain afterward, improved this AM. Tolerating diet.    cefTRIAXone   IVPB 2000 milliGRAM(s) IV Intermittent every 24 hours  fluconAZOLE IVPB 400 milliGRAM(s) IV Intermittent every 24 hours  heparin   Injectable 5000 Unit(s) SubCutaneous every 8 hours  metroNIDAZOLE  IVPB 500 milliGRAM(s) IV Intermittent every 8 hours      Vital Signs Last 24 Hrs  T(C): 37.3 (11 Oct 2020 20:32), Max: 37.3 (11 Oct 2020 20:32)  T(F): 99.2 (11 Oct 2020 20:32), Max: 99.2 (11 Oct 2020 20:32)  HR: 76 (12 Oct 2020 00:10) (76 - 87)  BP: 119/73 (12 Oct 2020 00:10) (119/73 - 156/70)  BP(mean): --  RR: 17 (12 Oct 2020 00:10) (17 - 19)  SpO2: 98% (12 Oct 2020 00:10) (94% - 98%)  I&O's Detail    10 Oct 2020 07:01  -  11 Oct 2020 07:00  --------------------------------------------------------  IN:    IV PiggyBack: 250 mL    IV PiggyBack: 100 mL    IV PiggyBack: 400 mL    Oral Fluid: 900 mL  Total IN: 1650 mL    OUT:    VAC (Vacuum Assisted Closure) System (mL): 0 mL    Voided (mL): 1150 mL  Total OUT: 1150 mL    Total NET: 500 mL      11 Oct 2020 07:01  -  12 Oct 2020 06:00  --------------------------------------------------------  IN:    IV PiggyBack: 300 mL    Oral Fluid: 860 mL  Total IN: 1160 mL    OUT:    VAC (Vacuum Assisted Closure) System (mL): 0 mL    Voided (mL): 950 mL  Total OUT: 950 mL    Total NET: 210 mL          Physical Exam:  General: No acute distress, resting comfortably in bed  C/V: normal sinus rhythm  Pulm: Nonlabored breathing, no respiratory distress  Abd: soft, non-tender, non-distended, prevena vac on with good suction  Extrem: SCDs in place    LABS:                        8.7    11.97 )-----------( 381      ( 11 Oct 2020 06:02 )             26.6     10-11    140  |  103  |  15  ----------------------------<  153<H>  4.1   |  24  |  0.61    Ca    8.0<L>      11 Oct 2020 06:02  Phos  2.1     10-11  Mg     2.0     10-11            RADIOLOGY & ADDITIONAL STUDIES:

## 2020-10-12 NOTE — DISCHARGE NOTE PROVIDER - NSDCMRMEDTOKEN_GEN_ALL_CORE_FT
alendronate 70 mg oral tablet: 1 tab(s) orally once a week  codeine sulfate 30 mg oral tablet: 1 tab(s) orally every 4 hours, As Needed  metFORMIN 500 mg oral tablet: 500 milligram(s) orally 2 times a day  Synthroid 100 mcg (0.1 mg) oral tablet: 1 tab(s) orally once a day

## 2020-10-12 NOTE — DISCHARGE NOTE PROVIDER - NSDCCPCAREPLAN_GEN_ALL_CORE_FT
PRINCIPAL DISCHARGE DIAGNOSIS  Diagnosis: SBO (small bowel obstruction)  Assessment and Plan of Treatment:        PRINCIPAL DISCHARGE DIAGNOSIS  Diagnosis: SBO (small bowel obstruction)  Assessment and Plan of Treatment:       SECONDARY DISCHARGE DIAGNOSES  Diagnosis: Pleural effusion, left  Assessment and Plan of Treatment:     Diagnosis: SIRS (systemic inflammatory response syndrome)  Assessment and Plan of Treatment: SIRS (systemic inflammatory response syndrome)    Diagnosis: Normocytic anemia  Assessment and Plan of Treatment: Normocytic anemia    Diagnosis: RBBB (right bundle branch block)  Assessment and Plan of Treatment: RBBB (right bundle branch block)    Diagnosis: Rheumatoid arthritis  Assessment and Plan of Treatment: Rheumatoid arthritis    Diagnosis: LBBB (left bundle branch block)  Assessment and Plan of Treatment: LBBB (left bundle branch block)    Diagnosis: Type 2 diabetes mellitus without complication, without long-term current use of insulin  Assessment and Plan of Treatment: Type 2 diabetes mellitus without complication, without long-term current use of insulin    Diagnosis: Leukocytosis, unspecified type  Assessment and Plan of Treatment: Leukocytosis, unspecified type    Diagnosis: Obesity (BMI 30.0-34.9)  Assessment and Plan of Treatment: Obesity (BMI 30.0-34.9)

## 2020-10-12 NOTE — PROGRESS NOTE ADULT - PROBLEM SELECTOR PLAN 2
Pt first demonstrated a LBBB that alternated with a LAHB, then she alternated with a RBBB which has persisted since Nov 2019
Pt first demonstrated a LBBB that alternated with a LAHB, then she alternated with a RBBB which has persisted since Nov 2019.  Rhythm is stable
ISS

## 2020-10-14 LAB — SURGICAL PATHOLOGY STUDY: SIGNIFICANT CHANGE UP

## 2020-10-19 DIAGNOSIS — M06.9 RHEUMATOID ARTHRITIS, UNSPECIFIED: ICD-10-CM

## 2020-10-19 DIAGNOSIS — E66.9 OBESITY, UNSPECIFIED: ICD-10-CM

## 2020-10-19 DIAGNOSIS — K65.0 GENERALIZED (ACUTE) PERITONITIS: ICD-10-CM

## 2020-10-19 DIAGNOSIS — K56.691 OTHER COMPLETE INTESTINAL OBSTRUCTION: ICD-10-CM

## 2020-10-19 DIAGNOSIS — A41.9 SEPSIS, UNSPECIFIED ORGANISM: ICD-10-CM

## 2020-10-19 DIAGNOSIS — K45.0 OTHER SPECIFIED ABDOMINAL HERNIA WITH OBSTRUCTION, WITHOUT GANGRENE: ICD-10-CM

## 2020-10-19 DIAGNOSIS — I95.81 POSTPROCEDURAL HYPOTENSION: ICD-10-CM

## 2020-10-19 DIAGNOSIS — E11.9 TYPE 2 DIABETES MELLITUS WITHOUT COMPLICATIONS: ICD-10-CM

## 2020-10-19 DIAGNOSIS — R18.8 OTHER ASCITES: ICD-10-CM

## 2020-10-19 DIAGNOSIS — K55.019 ACUTE (REVERSIBLE) ISCHEMIA OF SMALL INTESTINE, EXTENT UNSPECIFIED: ICD-10-CM

## 2020-10-19 DIAGNOSIS — I45.2 BIFASCICULAR BLOCK: ICD-10-CM

## 2020-10-19 DIAGNOSIS — B96.89 OTHER SPECIFIED BACTERIAL AGENTS AS THE CAUSE OF DISEASES CLASSIFIED ELSEWHERE: ICD-10-CM

## 2020-10-19 DIAGNOSIS — J90 PLEURAL EFFUSION, NOT ELSEWHERE CLASSIFIED: ICD-10-CM

## 2020-10-19 DIAGNOSIS — E03.9 HYPOTHYROIDISM, UNSPECIFIED: ICD-10-CM

## 2020-10-19 DIAGNOSIS — Z78.1 PHYSICAL RESTRAINT STATUS: ICD-10-CM

## 2020-10-19 DIAGNOSIS — J98.11 ATELECTASIS: ICD-10-CM

## 2020-10-21 PROCEDURE — 96375 TX/PRO/DX INJ NEW DRUG ADDON: CPT

## 2020-10-21 PROCEDURE — 93005 ELECTROCARDIOGRAM TRACING: CPT

## 2020-10-21 PROCEDURE — 86901 BLOOD TYPING SEROLOGIC RH(D): CPT

## 2020-10-21 PROCEDURE — 85610 PROTHROMBIN TIME: CPT

## 2020-10-21 PROCEDURE — 85027 COMPLETE CBC AUTOMATED: CPT

## 2020-10-21 PROCEDURE — 99285 EMERGENCY DEPT VISIT HI MDM: CPT | Mod: 25

## 2020-10-21 PROCEDURE — 36415 COLL VENOUS BLD VENIPUNCTURE: CPT

## 2020-10-21 PROCEDURE — 90662 IIV NO PRSV INCREASED AG IM: CPT

## 2020-10-21 PROCEDURE — 87086 URINE CULTURE/COLONY COUNT: CPT

## 2020-10-21 PROCEDURE — 87075 CULTR BACTERIA EXCEPT BLOOD: CPT

## 2020-10-21 PROCEDURE — 94002 VENT MGMT INPAT INIT DAY: CPT

## 2020-10-21 PROCEDURE — 96376 TX/PRO/DX INJ SAME DRUG ADON: CPT

## 2020-10-21 PROCEDURE — 74177 CT ABD & PELVIS W/CONTRAST: CPT

## 2020-10-21 PROCEDURE — 83690 ASSAY OF LIPASE: CPT

## 2020-10-21 PROCEDURE — 83605 ASSAY OF LACTIC ACID: CPT

## 2020-10-21 PROCEDURE — 96365 THER/PROPH/DIAG IV INF INIT: CPT | Mod: XU

## 2020-10-21 PROCEDURE — 93970 EXTREMITY STUDY: CPT

## 2020-10-21 PROCEDURE — 87070 CULTURE OTHR SPECIMN AEROBIC: CPT

## 2020-10-21 PROCEDURE — 71045 X-RAY EXAM CHEST 1 VIEW: CPT

## 2020-10-21 PROCEDURE — 86850 RBC ANTIBODY SCREEN: CPT

## 2020-10-21 PROCEDURE — 83036 HEMOGLOBIN GLYCOSYLATED A1C: CPT

## 2020-10-21 PROCEDURE — 85018 HEMOGLOBIN: CPT

## 2020-10-21 PROCEDURE — 82010 KETONE BODYS QUAN: CPT

## 2020-10-21 PROCEDURE — 84100 ASSAY OF PHOSPHORUS: CPT

## 2020-10-21 PROCEDURE — 84484 ASSAY OF TROPONIN QUANT: CPT

## 2020-10-21 PROCEDURE — 86900 BLOOD TYPING SEROLOGIC ABO: CPT

## 2020-10-21 PROCEDURE — 97116 GAIT TRAINING THERAPY: CPT

## 2020-10-21 PROCEDURE — 88307 TISSUE EXAM BY PATHOLOGIST: CPT

## 2020-10-21 PROCEDURE — 97530 THERAPEUTIC ACTIVITIES: CPT

## 2020-10-21 PROCEDURE — 96361 HYDRATE IV INFUSION ADD-ON: CPT

## 2020-10-21 PROCEDURE — 84295 ASSAY OF SERUM SODIUM: CPT

## 2020-10-21 PROCEDURE — 82962 GLUCOSE BLOOD TEST: CPT

## 2020-10-21 PROCEDURE — 96367 TX/PROPH/DG ADDL SEQ IV INF: CPT

## 2020-10-21 PROCEDURE — 82550 ASSAY OF CK (CPK): CPT

## 2020-10-21 PROCEDURE — 86769 SARS-COV-2 COVID-19 ANTIBODY: CPT

## 2020-10-21 PROCEDURE — 97162 PT EVAL MOD COMPLEX 30 MIN: CPT

## 2020-10-21 PROCEDURE — 80048 BASIC METABOLIC PNL TOTAL CA: CPT

## 2020-10-21 PROCEDURE — 84132 ASSAY OF SERUM POTASSIUM: CPT

## 2020-10-21 PROCEDURE — 87181 SC STD AGAR DILUTION PER AGT: CPT

## 2020-10-21 PROCEDURE — 85025 COMPLETE CBC W/AUTO DIFF WBC: CPT

## 2020-10-21 PROCEDURE — 82330 ASSAY OF CALCIUM: CPT

## 2020-10-21 PROCEDURE — 82803 BLOOD GASES ANY COMBINATION: CPT

## 2020-10-21 PROCEDURE — 87205 SMEAR GRAM STAIN: CPT

## 2020-10-21 PROCEDURE — 81001 URINALYSIS AUTO W/SCOPE: CPT

## 2020-10-21 PROCEDURE — 87184 SC STD DISK METHOD PER PLATE: CPT

## 2020-10-21 PROCEDURE — 85730 THROMBOPLASTIN TIME PARTIAL: CPT

## 2020-10-21 PROCEDURE — U0003: CPT

## 2020-10-21 PROCEDURE — 80053 COMPREHEN METABOLIC PANEL: CPT

## 2020-10-21 PROCEDURE — 87040 BLOOD CULTURE FOR BACTERIA: CPT

## 2020-10-21 PROCEDURE — 83735 ASSAY OF MAGNESIUM: CPT

## 2021-04-16 ENCOUNTER — NON-APPOINTMENT (OUTPATIENT)
Age: 76
End: 2021-04-16

## 2021-04-16 ENCOUNTER — APPOINTMENT (OUTPATIENT)
Dept: HEART AND VASCULAR | Facility: CLINIC | Age: 76
End: 2021-04-16
Payer: MEDICARE

## 2021-04-16 VITALS
OXYGEN SATURATION: 95 % | HEART RATE: 72 BPM | RESPIRATION RATE: 14 BRPM | BODY MASS INDEX: 27.48 KG/M2 | WEIGHT: 140 LBS | SYSTOLIC BLOOD PRESSURE: 150 MMHG | TEMPERATURE: 97 F | DIASTOLIC BLOOD PRESSURE: 64 MMHG | HEIGHT: 60 IN

## 2021-04-16 PROCEDURE — 93000 ELECTROCARDIOGRAM COMPLETE: CPT

## 2021-04-16 PROCEDURE — 99213 OFFICE O/P EST LOW 20 MIN: CPT

## 2021-04-16 NOTE — HISTORY OF PRESENT ILLNESS
[FreeTextEntry1] : GI- Dr Herbert colonoscopy Oct 2017\par Pulm- Dr Amin\par Rheum- Dr Trung Merritt(never went)\par PCP- Dr Anton\par Derm- Dr Lakesha Ramos\par Endo- Dr Jessica MO\par [facility]- Dr Joana Seth, ovarian cysts\par Neuro- Dr Lutz\par Ophtho- Dr Hector Everett\par Pain- Dr Garrison\par PT- Dr Manuelito Noe\par Surg- Dr Hines

## 2021-04-16 NOTE — PHYSICAL EXAM
[General Appearance - Well Developed] : well developed [Normal Appearance] : normal appearance [Well Groomed] : well groomed [General Appearance - Well Nourished] : well nourished [No Deformities] : no deformities [General Appearance - In No Acute Distress] : no acute distress [Normal Conjunctiva] : the conjunctiva exhibited no abnormalities [Eyelids - No Xanthelasma] : the eyelids demonstrated no xanthelasmas [Respiration, Rhythm And Depth] : normal respiratory rhythm and effort [Exaggerated Use Of Accessory Muscles For Inspiration] : no accessory muscle use [Auscultation Breath Sounds / Voice Sounds] : lungs were clear to auscultation bilaterally [Heart Sounds] : normal S1 and S2 [Heart Rate And Rhythm] : heart rate and rhythm were normal [Murmurs] : no murmurs present [Abdomen Soft] : soft [Abdomen Tenderness] : non-tender [Abnormal Walk] : normal gait [Abdomen Mass (___ Cm)] : no abdominal mass palpated [Nail Clubbing] : no clubbing of the fingernails [Cyanosis, Localized] : no localized cyanosis [Petechial Hemorrhages (___cm)] : no petechial hemorrhages [Skin Color & Pigmentation] : normal skin color and pigmentation [] : no rash [No Venous Stasis] : no venous stasis [Skin Lesions] : no skin lesions [No Skin Ulcers] : no skin ulcer [No Xanthoma] : no  xanthoma was observed [Oriented To Time, Place, And Person] : oriented to person, place, and time [Affect] : the affect was normal [Mood] : the mood was normal [No Anxiety] : not feeling anxious [FreeTextEntry1] : DP 2+ B/L, PT not felt

## 2021-04-16 NOTE — ASSESSMENT
[FreeTextEntry1] : LBBB- intermittent, often alternates with a LAHB.  S/P a coronary CTA 2010 with clean coronaries and a CCS of zero.  Now  a RBBB on 11/18/19.  TFTs sent as she is on reduced thyroid hormone.\par \par Leg pains- After examining pt and finding B/L 2+ DP pulses I doubt that this is claudication, MARCIO ordered.\par Fibromyalgia- formerly a pt of Dr Lyn, soon to be with Dr Merritt,  ? PMR per Dr Anton\par \par HTN- stable on no meds, elevated from pain and steroid injections, will defer treatment but would favor an ACE I.  BP borderline\par \par DM- screened for CAD in 2010 and had a CCS of zero and no plaque on CTA, clinically stable.  She is very sensitive to meds so I am reluctant to start a statin in light of her musculoskeletal pain syndromes\par \par RA- pts conduction abnormalities could be due to RA.  Will follow echo as well for aortic root size. NL June 11, 2018.  New RBBB on 11/18/19.  Pt did not click with Dr Merritt who did not think pt had RA.\par \par SBO- Oct 2020, sm bowel resection and partial hysterectomy.\par \par Maintainence- urging the vaccine

## 2021-04-16 NOTE — REASON FOR VISIT
[FreeTextEntry1] : 71 y/o F with an intermittent LBBB, DM, RA and occasionally elevated BPs.  A coronary CTA in 2010 revealed clean coronaries and a CCS of zero.  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  Therapist could not find pedal pulses.\par 4/16/21  In [facility] Oct 2020 with a SBO, needed surgery\par \par EKG: NSR, RBBB, left anterior hemiblock, low voltage,  no ST-Tw abn. RBBB is new as of 11/18/19\par 9/16/20 4/16/21

## 2021-04-16 NOTE — REVIEW OF SYSTEMS
[Feeling Fatigued] : feeling fatigued [Dyspnea on exertion] : dyspnea during exertion [Joint Pain] : joint pain [Joint Swelling] : joint swelling [Joint Stiffness] : joint stiffness [Muscle Cramps] : muscle cramps [Limb Weakness (Paresis)] : limb weakness [Negative] : Genitourinary

## 2021-04-18 NOTE — ED ADULT TRIAGE NOTE - ESI TRIAGE ACUITY LEVEL, MLM
End of Shift Note    Bedside shift change report given to Teressa Smiley (oncoming nurse) by Tiny France RN (offgoing nurse). Report included the following information SBAR, Kardex, Intake/Output, MAR and Recent Results    Shift worked: [id number]     Shift summary and any significant changes:    New admission completed, patient is alert and oriented x4, bed bound, x2 assist with changing, patient is able to assist with turning however very weak in extermities, patient has sacral wound, dry dressing applied, patient on contact precautions for MRSA. Patient's lower extermites with discoloration, scaly and peeling skin and toe nails very thick, skin is dry and peeling all over. Concerns for physician to address: None     Zone phone for oncoming shift:  [phone]     Activity:  Activity Level: Bed Rest  Number times ambulated in hallways past shift: 0  Number of times OOB to chair past shift: 0    Cardiac:   Cardiac Monitoring: No      Cardiac Rhythm: Sinus tachycardia    Access:   Current line(s): PIV     Genitourinary:   Urinary status: incontinent and external catheter    Respiratory:      Chronic home O2 use?: NO  Incentive spirometer at bedside: NO     GI:  Last Bowel Movement Date: 04/16/21  Current diet:  DIET REGULAR  Passing flatus: YES  Tolerating current diet: YES       Pain Management:   Patient states pain is manageable on current regimen: YES    Skin:  Kristian Score: 14  Interventions: float heels and increase time out of bed    Patient Safety:  Fall Score:  Total Score: 1  Interventions: gripper socks and pt to call before getting OOB       Length of Stay:  Expected LOS: - - -  Actual LOS: 1      Tiny France RN 3

## 2021-07-23 ENCOUNTER — APPOINTMENT (OUTPATIENT)
Dept: HEART AND VASCULAR | Facility: CLINIC | Age: 76
End: 2021-07-23
Payer: MEDICARE

## 2021-07-23 VITALS
OXYGEN SATURATION: 99 % | HEART RATE: 80 BPM | HEIGHT: 60 IN | DIASTOLIC BLOOD PRESSURE: 60 MMHG | BODY MASS INDEX: 28.27 KG/M2 | SYSTOLIC BLOOD PRESSURE: 128 MMHG | WEIGHT: 143.98 LBS

## 2021-07-23 PROCEDURE — 99213 OFFICE O/P EST LOW 20 MIN: CPT

## 2021-07-23 NOTE — REASON FOR VISIT
[FreeTextEntry1] : 71 y/o F with an intermittent LBBB, DM, RA and occasionally elevated BPs.  A coronary CTA in 2010 revealed clean coronaries and a CCS of zero.  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  Therapist could not find pedal pulses.\par 4/16/21  In [facility] Oct 2020 with a SBO, needed surgery\par 7/23/21 Has a hard time walking, did not have the Covid Vaccine!!  I pleaded with her to get it.  She is 76 and diabetic.\par \par EKG: NSR, RBBB, left anterior hemiblock, low voltage,  no ST-Tw abn. RBBB is new as of 11/18/19\par 9/16/20 4/16/21

## 2021-07-23 NOTE — ASSESSMENT
[FreeTextEntry1] : LBBB- intermittent, often alternates with a LAHB.  S/P a coronary CTA 2010 with clean coronaries and a CCS of zero.  Now  a RBBB on 11/18/19.  TFTs sent as she is on reduced thyroid hormone.\par \par Leg pains- After examining pt and finding B/L 2+ DP pulses I doubt that this is claudication, MARCIO ordered.\par Fibromyalgia- formerly a pt of Dr Lyn, soon to be with Dr Merritt,  ? PMR per Dr Anton\par \par HTN- stable on no meds, elevated from pain and steroid injections, will defer treatment but would favor an ACE I.  BP boetter\par \par DM- screened for CAD in 2010 and had a CCS of zero and no plaque on CTA, clinically stable.  She is very sensitive to meds so I am reluctant to start a statin in light of her musculoskeletal pain syndromes\par \par RA- pts conduction abnormalities could be due to RA.  Will follow echo as well for aortic root size. NL June 11, 2018.  New RBBB on 11/18/19.  Pt did not click with Dr Merritt who did not think pt had RA.\par \par SBO- Oct 2020, sm bowel resection and partial hysterectomy.\par \par Maintainence- urging the vaccine, too scared to get it.  I have pleaded with her to get the vaccine!!!

## 2021-07-23 NOTE — PHYSICAL EXAM
[General Appearance - Well Developed] : well developed [Normal Appearance] : normal appearance [Well Groomed] : well groomed [General Appearance - Well Nourished] : well nourished [No Deformities] : no deformities [General Appearance - In No Acute Distress] : no acute distress [Normal Conjunctiva] : the conjunctiva exhibited no abnormalities [Eyelids - No Xanthelasma] : the eyelids demonstrated no xanthelasmas [Respiration, Rhythm And Depth] : normal respiratory rhythm and effort [Exaggerated Use Of Accessory Muscles For Inspiration] : no accessory muscle use [Auscultation Breath Sounds / Voice Sounds] : lungs were clear to auscultation bilaterally [Heart Rate And Rhythm] : heart rate and rhythm were normal [Heart Sounds] : normal S1 and S2 [Murmurs] : no murmurs present [Abdomen Soft] : soft [Abdomen Tenderness] : non-tender [Abdomen Mass (___ Cm)] : no abdominal mass palpated [Abnormal Walk] : normal gait [Nail Clubbing] : no clubbing of the fingernails [Cyanosis, Localized] : no localized cyanosis [Petechial Hemorrhages (___cm)] : no petechial hemorrhages [Skin Color & Pigmentation] : normal skin color and pigmentation [] : no rash [No Venous Stasis] : no venous stasis [Skin Lesions] : no skin lesions [No Skin Ulcers] : no skin ulcer [No Xanthoma] : no  xanthoma was observed [Oriented To Time, Place, And Person] : oriented to person, place, and time [Affect] : the affect was normal [Mood] : the mood was normal [No Anxiety] : not feeling anxious [FreeTextEntry1] : DP 2+ B/L, PT not felt

## 2021-07-23 NOTE — HISTORY OF PRESENT ILLNESS
[FreeTextEntry1] : GI- Dr Herbert colonoscopy Oct 2017\par Pulm- Dr Amin\par Rheum- Dr Trung Merritt(never went)\par PCP- Dr Anton\par Derm- Dr Lakesha Ramos\par Endo- Dr Jsesica MO\par [facility]- Dr Joana Seth, ovarian cysts\par Neuro- Dr Lutz\par Ophtho- Dr Hector Everett\par Pain- Dr Garrison\par PT- Dr Manuelito Noe\par Surg- Dr Hines

## 2021-11-18 ENCOUNTER — APPOINTMENT (OUTPATIENT)
Dept: HEART AND VASCULAR | Facility: CLINIC | Age: 76
End: 2021-11-18

## 2021-12-01 ENCOUNTER — APPOINTMENT (OUTPATIENT)
Dept: HEART AND VASCULAR | Facility: CLINIC | Age: 76
End: 2021-12-01
Payer: MEDICARE

## 2021-12-01 VITALS
WEIGHT: 138 LBS | HEART RATE: 99 BPM | DIASTOLIC BLOOD PRESSURE: 99 MMHG | OXYGEN SATURATION: 99 % | TEMPERATURE: 97.9 F | BODY MASS INDEX: 27.09 KG/M2 | SYSTOLIC BLOOD PRESSURE: 122 MMHG | HEIGHT: 60 IN

## 2021-12-01 VITALS — DIASTOLIC BLOOD PRESSURE: 60 MMHG | SYSTOLIC BLOOD PRESSURE: 140 MMHG

## 2021-12-01 PROCEDURE — 93000 ELECTROCARDIOGRAM COMPLETE: CPT

## 2021-12-01 PROCEDURE — 99213 OFFICE O/P EST LOW 20 MIN: CPT

## 2021-12-01 NOTE — REASON FOR VISIT
[FreeTextEntry1] : 75 y/o F with an intermittent LBBB, DM, RA and occasionally elevated BPs.  A coronary CTA in 2010 revealed clean coronaries and a CCS of zero.  Last echo June 2018  had an EF of 66% and a normal aortic root.\par \par Had a B/L oopherectomy @ 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  Therapist could not find pedal pulses.\par 4/16/21  In [facility] Oct 2020 with a SBO, needed surgery\par 7/23/21 Has a hard time walking, did not have the Covid Vaccine!!  I pleaded with her to get it.  She is 76 and diabetic.\par 12/1/21 Doing PT, walking more.  Had the vaccines.  On Iron with Dr Anton. Told to have a colonoscopy. "Unable to do Cologard", due to limited hand mobility.  Reports GATES with mask wearing.\par \par EKG: NSR, RBBB, left anterior hemiblock, low voltage,  no ST-Tw abn. RBBB is new as of 11/18/19\par 9/16/20 4/16/21

## 2021-12-01 NOTE — ASSESSMENT
[FreeTextEntry1] : LBBB- intermittent, often alternates with a LAHB.  S/P a coronary CTA 2010 with clean coronaries and a CCS of zero.  Now  a RBBB on 11/18/19, 12/1/21.  TFTs sent as she is on reduced thyroid hormone. NL\par \par GATES- Could be due to mask wearing. Further w/u deferred, walks hallway without mask and without GATES.\par \par Leg pains- After examining pt and finding B/L 2+ DP pulses I doubt that this is claudication, MARCIO ordered.\par Fibromyalgia- formerly a pt of Dr Lyn, soon to be with Dr Merritt,  ? PMR per Dr Anton\par \par HTN- stable on no meds, elevated from pain and steroid injections, will defer treatment but would favor an ACE I.  BP better\par \par DM- screened for CAD in 2010 and had a CCS of zero and no plaque on CTA, clinically stable.  She is very sensitive to meds so I am reluctant to start a statin in light of her musculoskeletal pain syndromes\par \par RA- pts conduction abnormalities could be due to RA.  Will follow echo as well for aortic root size. NL June 11, 2018.  New RBBB on 11/18/19.  Pt did not click with Dr Merritt who did not think pt had RA.\par \par SBO- Oct 2020, sm bowel resection and partial hysterectomy.\par \par Maintainence- urging the vaccine, too scared to get it.  I have pleaded with her to get the vaccine!!!

## 2022-02-23 NOTE — OCCUPATIONAL THERAPY INITIAL EVALUATION ADULT - PHYSICAL ASSIST/NONPHYSICAL ASSIST: SIT/SUPINE, REHAB EVAL
details nonverbal cues (demo/gestures)/verbal cues/2 person assist No chest wall deformities/Normal respiratory pattern/Symmetric breath sounds clear to auscultation and percussion

## 2022-03-29 ENCOUNTER — APPOINTMENT (OUTPATIENT)
Dept: HEART AND VASCULAR | Facility: CLINIC | Age: 77
End: 2022-03-29
Payer: MEDICARE

## 2022-03-29 VITALS
OXYGEN SATURATION: 97 % | HEART RATE: 86 BPM | SYSTOLIC BLOOD PRESSURE: 144 MMHG | TEMPERATURE: 96.9 F | DIASTOLIC BLOOD PRESSURE: 67 MMHG | WEIGHT: 139.99 LBS | BODY MASS INDEX: 27.48 KG/M2 | HEIGHT: 60 IN

## 2022-03-29 PROCEDURE — 99213 OFFICE O/P EST LOW 20 MIN: CPT

## 2022-03-29 NOTE — PHYSICAL EXAM
[General Appearance - Well Developed] : well developed [Normal Appearance] : normal appearance [Well Groomed] : well groomed [General Appearance - Well Nourished] : well nourished [No Deformities] : no deformities [General Appearance - In No Acute Distress] : no acute distress [Normal Conjunctiva] : the conjunctiva exhibited no abnormalities [Eyelids - No Xanthelasma] : the eyelids demonstrated no xanthelasmas [Respiration, Rhythm And Depth] : normal respiratory rhythm and effort [Exaggerated Use Of Accessory Muscles For Inspiration] : no accessory muscle use [Auscultation Breath Sounds / Voice Sounds] : lungs were clear to auscultation bilaterally [Heart Rate And Rhythm] : heart rate and rhythm were normal [Heart Sounds] : normal S1 and S2 [Murmurs] : no murmurs present [Abdomen Soft] : soft [Abdomen Tenderness] : non-tender [Abdomen Mass (___ Cm)] : no abdominal mass palpated [Abnormal Walk] : normal gait [Cyanosis, Localized] : no localized cyanosis [Nail Clubbing] : no clubbing of the fingernails [Petechial Hemorrhages (___cm)] : no petechial hemorrhages [Skin Color & Pigmentation] : normal skin color and pigmentation [No Venous Stasis] : no venous stasis [] : no rash [Skin Lesions] : no skin lesions [No Skin Ulcers] : no skin ulcer [No Xanthoma] : no  xanthoma was observed [Oriented To Time, Place, And Person] : oriented to person, place, and time [Affect] : the affect was normal [Mood] : the mood was normal [No Anxiety] : not feeling anxious [FreeTextEntry1] : DP 2+ B/L, PT not felt

## 2022-03-29 NOTE — REVIEW OF SYSTEMS
[Joint Pain] : joint pain [Under Stress] : under stress [Negative] : Heme/Lymph [Feeling Fatigued] : feeling fatigued

## 2022-03-29 NOTE — REASON FOR VISIT
[FreeTextEntry1] : 78 y/o F with an intermittent LBBB, DM, RA and occasionally elevated BPs.  A coronary CTA in 2010 revealed clean coronaries and a CCS of zero.  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  Therapist could not find pedal pulses.\par 4/16/21  In [facility] Oct 2020 with a SBO, needed surgery\par 7/23/21 Has a hard time walking, did not have the Covid Vaccine!!  I pleaded with her to get it.  She is 76 and diabetic.\par 12/1/21 Doing PT, walking more.  Had the vaccines.  On Iron with Dr Anton. Told to have a colonoscopy. "Unable to do Cologard", due to limited hand mobility.  Reports GATES with mask wearing.\par 3/29/22 No cardiac c/o, BP good\par \par EKG: NSR, RBBB, left anterior hemiblock, low voltage,  no ST-Tw abn. RBBB is new as of 11/18/19\par 9/16/20 4/16/21

## 2022-03-29 NOTE — ASSESSMENT
[FreeTextEntry1] : LBBB- intermittent, often alternates with a LAHB.  S/P a coronary CTA 2010 with clean coronaries and a CCS of zero.  Now  a RBBB on 11/18/19, 12/1/21.  TFTs sent as she is on reduced thyroid hormone. NL\par \par GATES- Could be due to mask wearing. Further w/u deferred, walks hallway without mask and without GATES.\par \par Leg pains- After examining pt and finding B/L 2+ DP pulses I doubt that this is claudication, MARCIO ordered.\par Fibromyalgia- formerly a pt of Dr Lyn, soon to be with Dr Merritt,  ? PMR per Dr Anton\par \par HTN- stable on no meds, elevated from pain and steroid injections, will defer treatment but would favor an ACE I.  BP better\par \par DM- screened for CAD in 2010 and had a CCS of zero and no plaque on CTA, clinically stable.  She is very sensitive to meds so I am reluctant to start a statin in light of her musculoskeletal pain syndromes\par \par RA- pts conduction abnormalities could be due to RA.  Will follow echo as well for aortic root size. NL June 11, 2018.  New RBBB on 11/18/19.  Pt did not click with Dr Merritt who did not think pt had RA.\par \par SBO- Oct 2020, sm bowel resection and partial hysterectomy.\par \par Maintainence- urging the vaccine, too scared to get it.  I have pleaded with her to get the vaccine!!! She did get it.

## 2022-04-18 ENCOUNTER — APPOINTMENT (OUTPATIENT)
Age: 77
End: 2022-04-18
Payer: MEDICARE

## 2022-04-18 VITALS
SYSTOLIC BLOOD PRESSURE: 166 MMHG | BODY MASS INDEX: 27.88 KG/M2 | HEART RATE: 95 BPM | OXYGEN SATURATION: 97 % | RESPIRATION RATE: 16 BRPM | WEIGHT: 142 LBS | TEMPERATURE: 95 F | DIASTOLIC BLOOD PRESSURE: 72 MMHG | HEIGHT: 60 IN

## 2022-04-18 DIAGNOSIS — K21.9 GASTRO-ESOPHAGEAL REFLUX DISEASE W/OUT ESOPHAGITIS: ICD-10-CM

## 2022-04-18 PROCEDURE — 99204 OFFICE O/P NEW MOD 45 MIN: CPT

## 2022-04-18 NOTE — PHYSICAL EXAM
[General Appearance - Alert] : alert [Sclera] : the sclera and conjunctiva were normal [Outer Ear] : the ears and nose were normal in appearance [Neck Appearance] : the appearance of the neck was normal [] : no respiratory distress [Abdomen Soft] : soft [Abdomen Tenderness] : non-tender [Abnormal Walk] : normal gait [Cranial Nerves] : cranial nerves 2-12 were intact [Skin Color & Pigmentation] : normal skin color and pigmentation [Oriented To Time, Place, And Person] : oriented to person, place, and time

## 2022-04-20 NOTE — ASSESSMENT
[FreeTextEntry1] : Garden variety IR nondysplastic Gutierrez's\par -Pt understands this is a low risk diagnosis which requires surveillance every 3-5 years\par - Pt due for endoscopy within 1-2 years\par -Pt will start Protonix 20 mg daily\par - 6 month follow up with Dr. Sharpe via telemed\par \par Colonoscopy\par - Pt understands that undergoing a colonoscopy is not necessary; risks outweigh the benefits\par -Discussed with patient benefits of screening decline after age 75 for most people and there's little evidence to support continuing screening after age 85.\par - Recommend  to help find patient assistance in Cologuard testing\par \par \par \par \par

## 2022-04-20 NOTE — HISTORY OF PRESENT ILLNESS
[de-identified] : 78 y/o F with pmhx of SBO,GERD, intermittent LBBB, DM, RA, HTN, lyme disease, shingles and alopecia presents to clinic to discuss the need to repeat colonoscopy and endoscopy. Pt last colonoscopy was performed in 2017 by Dr. Herbert (possible tiny ulcers- will have results faxed to office). Pt states she is unable to undergo cologuard testing due to limited hand mobility. Pt last endoscopy was in 2019 which revealed Gutierrez's esophagus without dysplasia. Pt admits to intermittent burning in the sternum which is relieved with tums. Denies night time symptoms, dysphagia, odynophagia nor reflux.\par \par 1/2019: Endoscopy : ulcer and antral mucosa demonstrating regenerative changes consistent with edges of ulcer. No H. Pylori. Gastric oxyntic type mucosa demonstrating minimal non specific chronic inflammation. Gutierrez's esophagus presents in background of reflux associated changes; negative for dysplasia.  \par \par S/P SBO at [facility] 10/2020  likely due to adhesions treated with small bowel resection. Small bowel found to be incarcerated in mesenteric defect. Ileum found to be frankly necrotic, but not perforated.   \par \par \par \par \par \par

## 2022-07-27 NOTE — OBJECTIVE
[History reviewed] : History reviewed. [Medications and Allergies reviewed] : Medications and allergies reviewed. football hold

## 2022-09-07 ENCOUNTER — RX RENEWAL (OUTPATIENT)
Age: 77
End: 2022-09-07

## 2022-10-31 ENCOUNTER — RX RENEWAL (OUTPATIENT)
Age: 77
End: 2022-10-31

## 2022-10-31 RX ORDER — PANTOPRAZOLE 20 MG/1
20 TABLET, DELAYED RELEASE ORAL DAILY
Qty: 30 | Refills: 0 | Status: ACTIVE | COMMUNITY
Start: 2022-04-18 | End: 1900-01-01

## 2022-11-08 ENCOUNTER — APPOINTMENT (OUTPATIENT)
Age: 77
End: 2022-11-08

## 2023-01-10 ENCOUNTER — APPOINTMENT (OUTPATIENT)
Dept: HEART AND VASCULAR | Facility: CLINIC | Age: 78
End: 2023-01-10
Payer: MEDICARE

## 2023-01-10 VITALS
BODY MASS INDEX: 27.48 KG/M2 | WEIGHT: 140 LBS | HEART RATE: 83 BPM | DIASTOLIC BLOOD PRESSURE: 70 MMHG | OXYGEN SATURATION: 99 % | SYSTOLIC BLOOD PRESSURE: 112 MMHG | RESPIRATION RATE: 16 BRPM | HEIGHT: 60 IN

## 2023-01-10 VITALS
HEART RATE: 83 BPM | BODY MASS INDEX: 27.48 KG/M2 | WEIGHT: 140 LBS | RESPIRATION RATE: 16 BRPM | OXYGEN SATURATION: 99 % | DIASTOLIC BLOOD PRESSURE: 70 MMHG | HEIGHT: 60 IN | SYSTOLIC BLOOD PRESSURE: 112 MMHG | TEMPERATURE: 96.8 F

## 2023-01-10 DIAGNOSIS — I45.2 BIFASCICULAR BLOCK: ICD-10-CM

## 2023-01-10 DIAGNOSIS — I44.7 LEFT BUNDLE-BRANCH BLOCK, UNSPECIFIED: ICD-10-CM

## 2023-01-10 DIAGNOSIS — R03.0 ELEVATED BLOOD-PRESSURE READING, W/OUT DIAGNOSIS OF HYPERTENSION: ICD-10-CM

## 2023-01-10 DIAGNOSIS — R06.00 DYSPNEA, UNSPECIFIED: ICD-10-CM

## 2023-01-10 DIAGNOSIS — E11.9 TYPE 2 DIABETES MELLITUS W/OUT COMPLICATIONS: ICD-10-CM

## 2023-01-10 PROCEDURE — 99213 OFFICE O/P EST LOW 20 MIN: CPT

## 2023-01-10 PROCEDURE — 93000 ELECTROCARDIOGRAM COMPLETE: CPT

## 2023-01-10 NOTE — HISTORY OF PRESENT ILLNESS
[FreeTextEntry1] : GI- Dr Herbert colonoscopy Oct 2017\par Pulm- Dr Amin\par PCP- Dr Anton\par Derm- Dr Lakesha Ramos\par Endo- Dr Jessica MO\par [facility]- Dr Joana Seth, ovarian cysts\par Neuro- Dr Lutz\par Ophtho- Dr Hector Everett\par Pain- Dr Garrison\par PT- Dr Manuelito Noe\par Surg- Dr Hines

## 2023-01-10 NOTE — REVIEW OF SYSTEMS
[Feeling Fatigued] : feeling fatigued [Joint Pain] : joint pain [Under Stress] : under stress [Negative] : Heme/Lymph

## 2023-01-10 NOTE — ASSESSMENT
[FreeTextEntry1] : LBBB- intermittent, often alternates with a LAHB.  S/P a coronary CTA 2010 with clean coronaries and a CCS of zero.  Now  a RBBB/LAHB on 11/18/19, 12/1/21.  TFTs sent as she is on reduced thyroid hormone. NL\par \par GATES- Could be due to mask wearing. Further w/u deferred, walks hallway without mask and without GATES.\par \par Leg pains- After examining pt and finding B/L 2+ DP pulses I doubt that this is claudication, MARCIO ordered.\par Fibromyalgia- formerly a pt of Dr Lyn, soon to be with Dr Merritt,  ? PMR per Dr Anton\par \par HTN- stable on no meds, elevated from pain and steroid injections, will defer treatment but would favor an ACE I.  BP better.  No need to treat.\par \par DM- screened for CAD in 2010 and had a CCS of zero and no plaque on CTA, clinically stable.  She is very sensitive to meds so I am reluctant to start a statin in light of her musculoskeletal pain syndromes\par \par RA- pts conduction abnormalities could be due to RA.  Will follow echo as well for aortic root size. NL June 11, 2018.  New RBBB on 11/18/19.  Pt did not click with Dr Merritt who did not think pt had RA.\par \par SBO- Oct 2020, sm bowel resection and partial hysterectomy.\par \par Maintainence- urging the vaccine, too scared to get it.  I have pleaded with her to get the vaccine!!! She did get it. She gets the flu shot yearly

## 2023-01-10 NOTE — REASON FOR VISIT
[FreeTextEntry1] : 76 y/o F with an intermittent LBBB, DM, RA and occasionally elevated BPs.  A coronary CTA in 2010 revealed clean coronaries and a CCS of zero.  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  Therapist could not find pedal pulses.\par 4/16/21  In [facility] Oct 2020 with a SBO, needed surgery\par 7/23/21 Has a hard time walking, did not have the Covid Vaccine!!  I pleaded with her to get it.  She is 76 and diabetic.\par 12/1/21 Doing PT, walking more.  Had the vaccines.  On Iron with Dr Anton. Told to have a colonoscopy. "Unable to do Cologard", due to limited hand mobility.  Reports GATES with mask wearing.\par 3/29/22 No cardiac c/o, BP good\par 1/10/23 fell 3 times, due to arthritis. needs rehab\par \par EKG: NSR, RBBB, left anterior hemiblock, low voltage,  no ST-Tw abn. RBBB is new as of 11/18/19\par 9/16/20 4/16/21

## 2023-04-19 ENCOUNTER — APPOINTMENT (OUTPATIENT)
Dept: BREAST CENTER | Facility: CLINIC | Age: 78
End: 2023-04-19
Payer: MEDICARE

## 2023-04-19 VITALS
HEIGHT: 60 IN | SYSTOLIC BLOOD PRESSURE: 145 MMHG | WEIGHT: 136 LBS | BODY MASS INDEX: 26.7 KG/M2 | DIASTOLIC BLOOD PRESSURE: 74 MMHG | HEART RATE: 83 BPM

## 2023-04-19 DIAGNOSIS — Z78.9 OTHER SPECIFIED HEALTH STATUS: ICD-10-CM

## 2023-04-19 DIAGNOSIS — R92.8 OTHER ABNORMAL AND INCONCLUSIVE FINDINGS ON DIAGNOSTIC IMAGING OF BREAST: ICD-10-CM

## 2023-04-19 PROCEDURE — 99204 OFFICE O/P NEW MOD 45 MIN: CPT

## 2023-04-19 NOTE — PAST MEDICAL HISTORY
[Menarche Age ____] : age at menarche was [unfilled] [Menopause Age____] : age at menopause was [unfilled] [Total Preg ___] : G[unfilled] [History of Hormone Replacement Treatment] : has no history of hormone replacement treatment [FreeTextEntry6] : no [FreeTextEntry7] : no [FreeTextEntry8] : no

## 2023-04-19 NOTE — HISTORY OF PRESENT ILLNESS
[FreeTextEntry1] : Patient is a 79yo Ashkenazi Quaker F who presents today for initial evaluation of abnormal breast imaging. On annual MG, rec stereo bx of L faint groups calcs 11-12:00 2/20/23. She was referred by Dr. Angelica Anton. She has hx of R FA excision at age 20s. Denies family history of breast or ovarian cancer. Patient denies palpable masses, skin changes, or nipple discharge bilaterally. Of note, patient has multiple health comorbilities including mirphea (localized scleroderma), DM, LBBB, RBBB, DM, RA, SOB, HTN, lume disease. \par MOHAMUD Lifetime Risk- 2.8%\par \par 2/20/23: B/l MG (Deepti)- heterogenously dense. L faint groups calcs 11-12:00 UOQ 10FN (rec stereo bx). BI-RADS 4\par 2/20/23: B/l US (Deepti)- ANKUSH. Rec stereo bx. BI-RADS 4

## 2023-04-19 NOTE — PHYSICAL EXAM
[Normocephalic] : normocephalic [EOMI] : extra ocular movement intact [Supple] : supple [No Supraclavicular Adenopathy] : no supraclavicular adenopathy [No Cervical Adenopathy] : no cervical adenopathy [de-identified] : Bilateral breast/axilla/supraclavicular area: No masses, discharge, or adenopathy

## 2023-06-30 ENCOUNTER — NON-APPOINTMENT (OUTPATIENT)
Age: 78
End: 2023-06-30

## 2023-07-05 ENCOUNTER — APPOINTMENT (OUTPATIENT)
Dept: HEART AND VASCULAR | Facility: CLINIC | Age: 78
End: 2023-07-05

## 2023-08-25 ENCOUNTER — EMERGENCY (EMERGENCY)
Facility: HOSPITAL | Age: 78
LOS: 1 days | Discharge: HOME | End: 2023-08-25
Attending: EMERGENCY MEDICINE | Admitting: EMERGENCY MEDICINE
Payer: MEDICARE

## 2023-08-25 VITALS
RESPIRATION RATE: 18 BRPM | SYSTOLIC BLOOD PRESSURE: 117 MMHG | HEART RATE: 78 BPM | DIASTOLIC BLOOD PRESSURE: 64 MMHG | TEMPERATURE: 97 F | OXYGEN SATURATION: 100 %

## 2023-08-25 VITALS
DIASTOLIC BLOOD PRESSURE: 52 MMHG | RESPIRATION RATE: 18 BRPM | OXYGEN SATURATION: 99 % | SYSTOLIC BLOOD PRESSURE: 127 MMHG | TEMPERATURE: 98 F | HEART RATE: 91 BPM

## 2023-08-25 DIAGNOSIS — Z98.89 OTHER SPECIFIED POSTPROCEDURAL STATES: Chronic | ICD-10-CM

## 2023-08-25 DIAGNOSIS — Z98.890 OTHER SPECIFIED POSTPROCEDURAL STATES: Chronic | ICD-10-CM

## 2023-08-25 DIAGNOSIS — S09.90XA UNSPECIFIED INJURY OF HEAD, INITIAL ENCOUNTER: ICD-10-CM

## 2023-08-25 DIAGNOSIS — M54.9 DORSALGIA, UNSPECIFIED: ICD-10-CM
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
## 2023-08-25 DIAGNOSIS — S80.212A ABRASION, LEFT KNEE, INITIAL ENCOUNTER: ICD-10-CM

## 2023-08-25 DIAGNOSIS — R51.9 HEADACHE, UNSPECIFIED: ICD-10-CM

## 2023-08-25 DIAGNOSIS — Z90.722 ACQUIRED ABSENCE OF OVARIES, BILATERAL: Chronic | ICD-10-CM
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
## 2023-08-25 DIAGNOSIS — E11.9 TYPE 2 DIABETES MELLITUS WITHOUT COMPLICATIONS: ICD-10-CM

## 2023-08-25 DIAGNOSIS — R07.81 PLEURODYNIA: ICD-10-CM

## 2023-08-25 DIAGNOSIS — M06.9 RHEUMATOID ARTHRITIS, UNSPECIFIED: ICD-10-CM
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
## 2023-08-25 DIAGNOSIS — G89.29 OTHER CHRONIC PAIN: ICD-10-CM

## 2023-08-25 DIAGNOSIS — Z79.82 LONG TERM (CURRENT) USE OF ASPIRIN: ICD-10-CM

## 2023-08-25 DIAGNOSIS — R26.2 DIFFICULTY IN WALKING, NOT ELSEWHERE CLASSIFIED: ICD-10-CM

## 2023-08-25 DIAGNOSIS — Z90.49 ACQUIRED ABSENCE OF OTHER SPECIFIED PARTS OF DIGESTIVE TRACT: Chronic | ICD-10-CM

## 2023-08-25 PROCEDURE — 71101 X-RAY EXAM UNILAT RIBS/CHEST: CPT

## 2023-08-25 PROCEDURE — 70450 CT HEAD/BRAIN W/O DYE: CPT | Mod: 26,MC

## 2023-08-25 PROCEDURE — 71101 X-RAY EXAM UNILAT RIBS/CHEST: CPT | Mod: 26,LT

## 2023-08-25 PROCEDURE — 73562 X-RAY EXAM OF KNEE 3: CPT | Mod: 26,LT

## 2023-08-25 PROCEDURE — 99284 EMERGENCY DEPT VISIT MOD MDM: CPT | Mod: 25

## 2023-08-25 PROCEDURE — 70450 CT HEAD/BRAIN W/O DYE: CPT | Mod: MC

## 2023-08-25 PROCEDURE — 73562 X-RAY EXAM OF KNEE 3: CPT

## 2023-08-25 PROCEDURE — 99285 EMERGENCY DEPT VISIT HI MDM: CPT

## 2023-08-25 RX ORDER — ACETAMINOPHEN 500 MG
1000 TABLET ORAL ONCE
Refills: 0 | Status: COMPLETED | OUTPATIENT
Start: 2023-08-25 | End: 2023-08-25

## 2023-08-25 RX ADMIN — Medication 1000 MILLIGRAM(S): at 07:38

## 2023-08-25 NOTE — ED ADULT NURSE NOTE - NS ED NOTE ABUSE RESPONSE YN
Left sided abdominal pain radiating around to left flank area since 5am this morning. No fever. + nausea. 1 episode of  \"dry heaving\" PTA stating \"I haven't eaten all day\". Couple episodes of diarrhea the last few days. C/o pain post voiding.
Yes

## 2023-08-25 NOTE — ED ADULT TRIAGE NOTE - ARRIVAL INFO ADDITIONAL COMMENTS
pt stated to EMS she fell on sidewalk, is unsure why.   bystander activated 911.   was able to get up with assistance.   upon arrival here pt is less clear on event.   when asked pt states she does not know what happened.   when asked the year she says 2022.   oriented otherwise.

## 2023-08-25 NOTE — ED ADULT NURSE REASSESSMENT NOTE - NS ED NURSE REASSESS COMMENT FT1
Patient provided food to take home and hot meal/lunch tray to eat before DC.
Patient c/o left rib pain when standing to move to CT bed. Dr. Brady notified, new XR orders placed.

## 2023-08-25 NOTE — ED ADULT NURSE NOTE - NSFALLRISKINTERV_ED_ALL_ED
Assistance OOB with selected safe patient handling equipment if applicable/Communicate fall risk and risk factors to all staff, patient, and family/Monitor for mental status changes and reorient to person, place, and time, as needed/Move patient closer to nursing station/within visual sight of ED staff/Provide visual cue: yellow wristband, yellow gown, etc/Reinforce activity limits and safety measures with patient and family/Toileting schedule using arm’s reach rule for commode and bathroom/Use of alarms - bed, stretcher, chair and/or video monitoring/Call bell, personal items and telephone in reach/Instruct patient to call for assistance before getting out of bed/chair/stretcher/Non-slip footwear applied when patient is off stretcher/[place] to call system/Physically safe environment - no spills, clutter or unnecessary equipment/Purposeful Proactive Rounding/Room/bathroom lighting operational, light cord in reach

## 2023-08-25 NOTE — ED ADULT NURSE NOTE - NSFALLHARMRISKINTERV_ED_ALL_ED
Assistance OOB with selected safe patient handling equipment if applicable/Communicate risk of Fall with Harm to all staff, patient, and family/Monitor gait and stability/Monitor for mental status changes and reorient to person, place, and time, as needed/Move patient closer to nursing station/within visual sight of ED staff/Provide patient with walking aids/Provide visual cue: red socks, yellow wristband, yellow gown, etc/Reinforce activity limits and safety measures with patient and family/Toileting schedule using arm’s reach rule for commode and bathroom/Use of alarms - bed, stretcher, chair and/or video monitoring/Bed in lowest position, wheels locked, appropriate side rails in place/Call bell, personal items and telephone in reach/Instruct patient to call for assistance before getting out of bed/chair/stretcher/Non-slip footwear applied when patient is off stretcher/[place] to call system/Physically safe environment - no spills, clutter or unnecessary equipment/Purposeful Proactive Rounding/Room/bathroom lighting operational, light cord in reach

## 2023-08-25 NOTE — ED ADULT TRIAGE NOTE - MEANS OF ARRIVAL
Follow-up with your doctor, call the office to make an appointment. Return the emergency department if your symptoms worsen. stretcher

## 2023-08-25 NOTE — ED PROVIDER NOTE - CARE PLAN
Principal Discharge DX:	Fall  Secondary Diagnosis:	Head trauma  Secondary Diagnosis:	Knee injury   1

## 2023-08-25 NOTE — ED PROVIDER NOTE - OBJECTIVE STATEMENT
77 y/o F with a PMHx of DM, chronic back pain (taking oxycodone), and rheumatoid arthritis was walking to see her friends and had a mechanical fall outside and fell forward on her left knee and rib. Pt may have hit her head but is unsure. Pt denies any LOC but does admit to a slight headache. Pt walks normally with a cane but doesn't have it with her now. Pt takes aspirin on a daily basis. Pt last took oxycodone this morning.

## 2023-08-25 NOTE — ED ADULT NURSE NOTE - OBJECTIVE STATEMENT
79 yo F PMHx DM2, RA presents to the ED s/p fall today. Pt is oriented to person and place. Pt able to tell RN the month, but said the year was 2022. Pt poor historian. Pt initially reported she did not feel well and thought she may fell, but then reported she was out walking to meet up with some friends and slipped because the ground was wet. Pt reports she hit her head and her R knee. Pt presents with abrasion to L knee. Pt reports her neck and back hurt, but that she has chronic pain in her neck and back. Pt denies ha, dizziness, lightheadedness, CP, SOB, numbness, tingling, N/V/D, f/c, blurry vision / vision changes. 77 yo F PMHx DM2, RA presents to the ED s/p fall today. Pt is oriented to person and place. Pt able to tell RN the month, but said the year was 2022. Pt poor historian. Pt initially reported she did not feel well and thought she may fell, but then reported she was out walking to meet up with some friends and slipped because the ground was wet. Pt reports she hit her head and her R knee. Pt denies LOC. Pt reports she takes ASA. Pt presents with abrasion to L knee. Pt reports her neck and back hurt, but that she has chronic pain in her neck and back. Pt denies ha, dizziness, lightheadedness, CP, SOB, numbness, tingling, N/V/D, f/c, blurry vision / vision changes.

## 2023-08-25 NOTE — ED PROVIDER NOTE - PHYSICAL EXAMINATION
VITAL SIGNS: I have reviewed nursing notes and confirm.  CONSTITUTIONAL: Well-developed; well-nourished; in no acute distress.  SKIN: Agree with RN documentation regarding decubitus evaluation. Remainder of skin exam is warm and dry, no acute rash.  HEAD: Normocephalic; atraumatic.  EYES: PERRL, EOM intact; conjunctiva and sclera clear.  ENT: No nasal discharge; airway clear.  NECK: Supple; non tender.  CARD: S1, S2 normal; no murmurs, gallops, or rubs. Regular rate and rhythm.  RESP: Mild anterior left rib tenderness. No wheezes, rales or rhonchi.  ABD: Normal bowel sounds; soft; non-distended; non-tender; no hepatosplenomegaly.  EXT: Small abrasion to left knee. Pt able to ambulate with assistance. Normal ROM. No clubbing, cyanosis or edema.  LYMPH: No acute cervical adenopathy.  NEURO: Alert & Oriented x 3. CN II-XII intact. No facial droop. Clear speech. MELO w/ 5/5 strength x 4 ext. Normal sensation. No pronator drift. No dysdidokinesia nor dysmetria. Normal heel-to-shin.   PSYCH: Cooperative, appropriate.

## 2023-08-25 NOTE — ED PROVIDER NOTE - NSFOLLOWUPINSTRUCTIONS_ED_ALL_ED_FT
Take your regularly prescribed medications.  Be cautious with taking oxycodone at home.  Follow up with your PMD as needed.  Return to ED with worsening symptoms or other concerns.    Fall Prevention for Older Adults    WHAT YOU NEED TO KNOW:    What is fall prevention? Fall prevention includes ways to make your home and other areas safer. Prevention also includes ways you can move more carefully to prevent a fall.    What increases my risk for falls? As you age, your muscles weaken and your risk for falls increases. Your risk also increases if you take medicines that make you sleepy or dizzy. You may also be at risk if you have vision or joint problems, have low blood pressure, or are not active.    How can I help protect myself from falls? Falls are a common cause of injury for older adults. Do the following to help protect yourself:    Stay active. Exercise can help strengthen your muscles and improve your balance. Your healthcare provider may recommend water aerobics, walking, or Jarod Chi. He or she may also recommend physical therapy to improve your coordination. Never start an exercise program without asking your healthcare provider first.  Water Aerobics for Seniors  Jarod Chi for Seniors      Wear shoes that fit well and have soles that . Wear shoes both inside and outside. Use slippers with good . Avoid shoes with high heels.    Use assistive devices as directed. Your healthcare provider may suggest that you use a cane or walker to help you keep your balance. You may need to have grab bars put in your bathroom near the toilet or in the shower.    Stand or sit up slowly. This may help you keep your balance and prevent falls.    Manage your medical conditions. Keep all appointments with your healthcare providers. Visit your eye doctor as directed.  How can I make my home safer?  Fall Prevention for Seniors    Add items to prevent falls in the bathroom. Put nonslip strips on your bath or shower floor to prevent you from slipping. Use a bath mat if you do not have carpet in the bathroom. This will prevent you from falling when you step out of the bath or shower. Use a shower seat so you do not need to stand while you shower. Sit on the toilet or a chair in your bathroom to dry yourself and put on clothing. This will prevent you from losing your balance from drying or dressing yourself while you are standing.    Keep paths clear. Remove books, shoes, and other objects from walkways and stairs. Place cords for telephones and lamps out of the way so that you do not need to walk over them. Tape them down if you cannot move them. Remove small rugs. If you cannot remove a rug, secure it with double-sided tape. This will prevent you from tripping.    Install bright lights in your home. Use night lights to help light paths to the bathroom or kitchen. Always turn on the light before you start walking.    Keep items you use often on shelves within reach. Do not use a step stool to help you reach an item.    Paint or place reflective tape on the edges of your stairs. This will help you see the stairs better.  How do I plan ahead in case I do fall? Talk with family members, friends, and neighbors to create a fall plan. Someone will need to call for emergency help if you are injured or found unconscious. If possible, keep a mobile phone with you at all times, or wear an emergency alert device. You can contact emergency services by pressing a button on the device. Ask your healthcare provider for more information.    Arrange to have someone call your local emergency number (911 in the ) if:    You have fallen and are found unconscious.    You have fallen and cannot move part of your body.  When should I call my doctor?    You have fallen and have pain or a headache.    You have questions or concerns about your condition or care.  CARE AGREEMENT:    You have the right to help plan your care. Learn about your health condition and how it may be treated. Discuss treatment options with your healthcare providers to decide what care you want to receive. You always have the right to refuse treatment.    © [employer] 1973, 2023    	  back to top            © [employer] 1973, 2023

## 2023-08-25 NOTE — ED PROVIDER NOTE - PATIENT PORTAL LINK FT
You can access the FollowMyHealth Patient Portal offered by [facility] by registering at the following website: [email or web address]. By joining [employer]’s FollowMyHealth portal, you will also be able to view your health information using other applications (apps) compatible with our system.

## 2023-08-25 NOTE — ED ADULT NURSE REASSESSMENT NOTE - CARDIO ASSESSMENT
Received refill request for LOSARTAN-HCTZ 100-25 MG TAB  Request is approved  because refill protocol Met approval criteria  LOV and or labs were verified to be correct  Refill sent to pharmacy  
---
Yes

## 2023-08-25 NOTE — ED PROVIDER NOTE - CLINICAL SUMMARY MEDICAL DECISION MAKING FREE TEXT BOX
79 y/o F here s/p mechanical fall with slight headache, left knee pain, and rib pain. Pt neurologically intact on exam. Pt with small abrasion to left knee but moving all extremities with full ROM. Imaging showing no rib or knee fractures will most likely discharge home. 79 y/o F here s/p mechanical fall with slight headache, left knee pain, and rib pain. Pt neurologically intact on exam. Pt with small abrasion to left knee but moving all extremities with full ROM. Imaging showing no rib or knee fractures will most likely discharge home.    Pt ambulating with cane in ED  Seen by FELIX and discussed with lydia Nguyen [phone] who is concerned about patient's living conditions and possible start of dementia.  Pt oriented x 3 in ED and requesting dc home.  As per FELIX, will send ACS into home to evaluate situation.  Nephew aware.  [facility] care arranged for transport home.  Pt received cane in ED.

## 2023-08-29 ENCOUNTER — APPOINTMENT (OUTPATIENT)
Dept: HEART AND VASCULAR | Facility: CLINIC | Age: 78
End: 2023-08-29

## 2023-09-25 ENCOUNTER — APPOINTMENT (OUTPATIENT)
Dept: HEART AND VASCULAR | Facility: CLINIC | Age: 78
End: 2023-09-25

## 2024-09-16 ENCOUNTER — EMERGENCY (EMERGENCY)
Facility: HOSPITAL | Age: 79
LOS: 1 days | Discharge: HOME | End: 2024-09-16
Admitting: EMERGENCY MEDICINE
Payer: MEDICARE

## 2024-09-16 VITALS
SYSTOLIC BLOOD PRESSURE: 148 MMHG | DIASTOLIC BLOOD PRESSURE: 76 MMHG | TEMPERATURE: 98 F | RESPIRATION RATE: 18 BRPM | OXYGEN SATURATION: 100 % | HEART RATE: 66 BPM

## 2024-09-16 VITALS
WEIGHT: 110.89 LBS | DIASTOLIC BLOOD PRESSURE: 73 MMHG | HEART RATE: 71 BPM | SYSTOLIC BLOOD PRESSURE: 126 MMHG | RESPIRATION RATE: 18 BRPM | OXYGEN SATURATION: 99 % | TEMPERATURE: 99 F

## 2024-09-16 DIAGNOSIS — Z90.722 ACQUIRED ABSENCE OF OVARIES, BILATERAL: Chronic | ICD-10-CM

## 2024-09-16 DIAGNOSIS — Z98.890 OTHER SPECIFIED POSTPROCEDURAL STATES: Chronic | ICD-10-CM

## 2024-09-16 DIAGNOSIS — Z90.49 ACQUIRED ABSENCE OF OTHER SPECIFIED PARTS OF DIGESTIVE TRACT: Chronic | ICD-10-CM

## 2024-09-16 DIAGNOSIS — Z98.89 OTHER SPECIFIED POSTPROCEDURAL STATES: Chronic | ICD-10-CM

## 2024-09-16 PROBLEM — M54.9 DORSALGIA, UNSPECIFIED: Chronic | Status: ACTIVE | Noted: 2023-08-25

## 2024-09-16 PROBLEM — M06.9 RHEUMATOID ARTHRITIS, UNSPECIFIED: Chronic | Status: ACTIVE | Noted: 2023-08-25

## 2024-09-16 PROBLEM — E11.9 TYPE 2 DIABETES MELLITUS WITHOUT COMPLICATIONS: Chronic | Status: ACTIVE | Noted: 2023-08-25

## 2024-09-16 PROCEDURE — 99284 EMERGENCY DEPT VISIT MOD MDM: CPT

## 2024-09-16 PROCEDURE — 99284 EMERGENCY DEPT VISIT MOD MDM: CPT | Mod: 25

## 2024-09-16 PROCEDURE — 70450 CT HEAD/BRAIN W/O DYE: CPT | Mod: MC

## 2024-09-16 PROCEDURE — 70450 CT HEAD/BRAIN W/O DYE: CPT | Mod: 26,MC

## 2024-09-16 NOTE — ED PROVIDER NOTE - PATIENT PORTAL LINK FT
You can access the FollowMyHealth Patient Portal offered by [facility] by registering at the following website: [email or web address]. By joining [employer]’s FollowMyHealth portal, you will also be able to view your health information using other applications (apps) compatible with our system.

## 2024-09-16 NOTE — ED PROVIDER NOTE - OBJECTIVE STATEMENT
78 yo F with pmh of DM, RA c/o fall with headstrike this morning. Pt walks with a cane, was coming back from the bathroom and slipped on water and hit her heads. No LOC. Pt got up with assistance. Pt iced her head and the swelling improved. Denies HA, dizziness, neck pain, back pain, hip pain, n/v. Not on any AC.

## 2024-09-16 NOTE — ED ADULT NURSE NOTE - OBJECTIVE STATEMENT
79yoF BIBEMS from [facility] rehab s/p trip and falling this am. Pt states she was walking to the bathroom when she slipped on water and hit her head. Pt denies pain, LOC, blood thinner use. No bruising or deformity noted. Pt ambulates with steady gait and cane,

## 2024-09-16 NOTE — ED PROVIDER NOTE - CLINICAL SUMMARY MEDICAL DECISION MAKING FREE TEXT BOX
78 yo F with pmh of DM, RA c/o fall with headstrike this morning. Pt walks with a cane, was coming back from the bathroom and slipped on water and hit her heads. No LOC. Pt got up with assistance. Pt iced her head and the swelling improved. Denies HA, dizziness, neck pain, back pain, hip pain, n/v. Neuro intact. No spinal or pelvic tenderness. 78 yo F with pmh of DM, RA c/o fall with headstrike this morning. Pt walks with a cane, was coming back from the bathroom and slipped on water and hit her heads. No LOC. Pt got up with assistance. Pt iced her head and the swelling improved. Denies HA, dizziness, neck pain, back pain, hip pain, n/v. Neuro intact. No spinal or pelvic tenderness. CTH neg

## 2024-09-18 DIAGNOSIS — S09.90XA UNSPECIFIED INJURY OF HEAD, INITIAL ENCOUNTER: ICD-10-CM

## 2024-09-18 DIAGNOSIS — Z88.5 ALLERGY STATUS TO NARCOTIC AGENT: ICD-10-CM

## 2024-09-18 DIAGNOSIS — M06.9 RHEUMATOID ARTHRITIS, UNSPECIFIED: ICD-10-CM

## 2024-09-18 DIAGNOSIS — S00.93XA CONTUSION OF UNSPECIFIED PART OF HEAD, INITIAL ENCOUNTER: ICD-10-CM

## 2024-09-18 DIAGNOSIS — Z88.0 ALLERGY STATUS TO PENICILLIN: ICD-10-CM
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
## 2024-09-18 DIAGNOSIS — E11.9 TYPE 2 DIABETES MELLITUS WITHOUT COMPLICATIONS: ICD-10-CM
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
## 2024-09-18 DIAGNOSIS — Z88.8 ALLERGY STATUS TO OTHER DRUGS, MEDICAMENTS AND BIOLOGICAL SUBSTANCES: ICD-10-CM

## 2024-12-03 NOTE — ED ADULT NURSE NOTE - NS ED NURSE LEVEL OF CONSCIOUSNESS SPEECH
How Severe Are Your Spot(S)?: mild
What Type Of Note Output Would You Prefer (Optional)?: Bullet Format
What Is The Reason For Today's Visit?: Full Body Skin Examination
What Is The Reason For Today's Visit? (Being Monitored For X): concerning skin lesions on an annual basis
Speaking Coherently

## 2025-03-01 NOTE — OCCUPATIONAL THERAPY INITIAL EVALUATION ADULT - PERTINENT HX OF CURRENT PROBLEM, REHAB EVAL
Bowel Obstruction. Now s/p Exploratory laparotomy (exploratory laparotomy, reduction of incarcerated SB, FRANCISCO, SBR (120 cm ileum), primary anastomosis) 10/5/2020.
N/A

## 2025-04-29 NOTE — ED PROVIDER NOTE - IV ALTEPLASE ADMIN OUTSIDE HIDDEN
Attempted to call Maya Reza regarding her [place] screening results. Call was completed with [place]  ID#[id number].    I left a voicemail with my name and phone number for the patient to call me back.     Shannan Varghese MS [facility] [place]  Prenatal Genetics   show